# Patient Record
Sex: FEMALE | Race: WHITE | NOT HISPANIC OR LATINO | URBAN - METROPOLITAN AREA
[De-identification: names, ages, dates, MRNs, and addresses within clinical notes are randomized per-mention and may not be internally consistent; named-entity substitution may affect disease eponyms.]

---

## 2018-11-23 ENCOUNTER — INPATIENT (INPATIENT)
Facility: HOSPITAL | Age: 73
LOS: 5 days | Discharge: NURSING, REHAB OR LONG-TERM CARE | DRG: 418 | End: 2018-11-29
Attending: SURGERY | Admitting: SURGERY
Payer: MEDICARE

## 2018-11-23 VITALS
DIASTOLIC BLOOD PRESSURE: 71 MMHG | HEART RATE: 68 BPM | HEIGHT: 66 IN | TEMPERATURE: 99 F | WEIGHT: 293 LBS | SYSTOLIC BLOOD PRESSURE: 186 MMHG | RESPIRATION RATE: 17 BRPM | OXYGEN SATURATION: 96 %

## 2018-11-23 LAB
ALBUMIN SERPL ELPH-MCNC: 3.7 G/DL — SIGNIFICANT CHANGE UP (ref 3.3–5)
ALP SERPL-CCNC: 82 U/L — SIGNIFICANT CHANGE UP (ref 40–120)
ALT FLD-CCNC: 20 U/L — SIGNIFICANT CHANGE UP (ref 12–78)
ANION GAP SERPL CALC-SCNC: 8 MMOL/L — SIGNIFICANT CHANGE UP (ref 5–17)
APTT BLD: 29.2 SEC — SIGNIFICANT CHANGE UP (ref 27.5–36.3)
AST SERPL-CCNC: 24 U/L — SIGNIFICANT CHANGE UP (ref 15–37)
BASOPHILS # BLD AUTO: 0.04 K/UL — SIGNIFICANT CHANGE UP (ref 0–0.2)
BASOPHILS NFR BLD AUTO: 0.3 % — SIGNIFICANT CHANGE UP (ref 0–2)
BILIRUB SERPL-MCNC: 0.7 MG/DL — SIGNIFICANT CHANGE UP (ref 0.2–1.2)
BUN SERPL-MCNC: 16 MG/DL — SIGNIFICANT CHANGE UP (ref 7–23)
CALCIUM SERPL-MCNC: 9.4 MG/DL — SIGNIFICANT CHANGE UP (ref 8.5–10.1)
CHLORIDE SERPL-SCNC: 99 MMOL/L — SIGNIFICANT CHANGE UP (ref 96–108)
CO2 SERPL-SCNC: 30 MMOL/L — SIGNIFICANT CHANGE UP (ref 22–31)
CREAT SERPL-MCNC: 0.66 MG/DL — SIGNIFICANT CHANGE UP (ref 0.5–1.3)
EOSINOPHIL # BLD AUTO: 0 K/UL — SIGNIFICANT CHANGE UP (ref 0–0.5)
EOSINOPHIL NFR BLD AUTO: 0 % — SIGNIFICANT CHANGE UP (ref 0–6)
GLUCOSE SERPL-MCNC: 127 MG/DL — HIGH (ref 70–99)
HCT VFR BLD CALC: 40.7 % — SIGNIFICANT CHANGE UP (ref 34.5–45)
HGB BLD-MCNC: 13 G/DL — SIGNIFICANT CHANGE UP (ref 11.5–15.5)
IMM GRANULOCYTES NFR BLD AUTO: 0.5 % — SIGNIFICANT CHANGE UP (ref 0–1.5)
INR BLD: 1.03 RATIO — SIGNIFICANT CHANGE UP (ref 0.88–1.16)
LIDOCAIN IGE QN: 115 U/L — SIGNIFICANT CHANGE UP (ref 73–393)
LYMPHOCYTES # BLD AUTO: 0.75 K/UL — LOW (ref 1–3.3)
LYMPHOCYTES # BLD AUTO: 5.2 % — LOW (ref 13–44)
MCHC RBC-ENTMCNC: 25.6 PG — LOW (ref 27–34)
MCHC RBC-ENTMCNC: 31.9 GM/DL — LOW (ref 32–36)
MCV RBC AUTO: 80.3 FL — SIGNIFICANT CHANGE UP (ref 80–100)
MONOCYTES # BLD AUTO: 0.74 K/UL — SIGNIFICANT CHANGE UP (ref 0–0.9)
MONOCYTES NFR BLD AUTO: 5.1 % — SIGNIFICANT CHANGE UP (ref 2–14)
NEUTROPHILS # BLD AUTO: 12.88 K/UL — HIGH (ref 1.8–7.4)
NEUTROPHILS NFR BLD AUTO: 88.9 % — HIGH (ref 43–77)
NT-PROBNP SERPL-SCNC: 98 PG/ML — SIGNIFICANT CHANGE UP (ref 0–450)
PLATELET # BLD AUTO: 273 K/UL — SIGNIFICANT CHANGE UP (ref 150–400)
POTASSIUM SERPL-MCNC: 4.1 MMOL/L — SIGNIFICANT CHANGE UP (ref 3.5–5.3)
POTASSIUM SERPL-SCNC: 4.1 MMOL/L — SIGNIFICANT CHANGE UP (ref 3.5–5.3)
PROT SERPL-MCNC: 8.3 G/DL — SIGNIFICANT CHANGE UP (ref 6–8.3)
PROTHROM AB SERPL-ACNC: 11.6 SEC — SIGNIFICANT CHANGE UP (ref 10–12.9)
RBC # BLD: 5.07 M/UL — SIGNIFICANT CHANGE UP (ref 3.8–5.2)
RBC # FLD: 13.9 % — SIGNIFICANT CHANGE UP (ref 10.3–14.5)
SODIUM SERPL-SCNC: 137 MMOL/L — SIGNIFICANT CHANGE UP (ref 135–145)
TROPONIN I SERPL-MCNC: <.015 NG/ML — SIGNIFICANT CHANGE UP (ref 0.01–0.04)
WBC # BLD: 14.48 K/UL — HIGH (ref 3.8–10.5)
WBC # FLD AUTO: 14.48 K/UL — HIGH (ref 3.8–10.5)

## 2018-11-23 PROCEDURE — 76705 ECHO EXAM OF ABDOMEN: CPT | Mod: 26

## 2018-11-23 PROCEDURE — 71046 X-RAY EXAM CHEST 2 VIEWS: CPT | Mod: 26

## 2018-11-23 RX ORDER — IOHEXOL 300 MG/ML
30 INJECTION, SOLUTION INTRAVENOUS ONCE
Qty: 0 | Refills: 0 | Status: COMPLETED | OUTPATIENT
Start: 2018-11-23 | End: 2018-11-23

## 2018-11-23 RX ORDER — SODIUM CHLORIDE 9 MG/ML
3 INJECTION INTRAMUSCULAR; INTRAVENOUS; SUBCUTANEOUS ONCE
Qty: 0 | Refills: 0 | Status: COMPLETED | OUTPATIENT
Start: 2018-11-23 | End: 2018-11-23

## 2018-11-23 RX ORDER — ACETAMINOPHEN 500 MG
1000 TABLET ORAL ONCE
Qty: 0 | Refills: 0 | Status: COMPLETED | OUTPATIENT
Start: 2018-11-23 | End: 2018-11-24

## 2018-11-23 RX ORDER — SODIUM CHLORIDE 9 MG/ML
1000 INJECTION INTRAMUSCULAR; INTRAVENOUS; SUBCUTANEOUS ONCE
Qty: 0 | Refills: 0 | Status: COMPLETED | OUTPATIENT
Start: 2018-11-23 | End: 2018-11-24

## 2018-11-23 RX ORDER — KETOROLAC TROMETHAMINE 30 MG/ML
30 SYRINGE (ML) INJECTION ONCE
Qty: 0 | Refills: 0 | Status: DISCONTINUED | OUTPATIENT
Start: 2018-11-23 | End: 2018-11-23

## 2018-11-23 RX ORDER — SODIUM CHLORIDE 9 MG/ML
1000 INJECTION INTRAMUSCULAR; INTRAVENOUS; SUBCUTANEOUS ONCE
Qty: 0 | Refills: 0 | Status: COMPLETED | OUTPATIENT
Start: 2018-11-23 | End: 2018-11-23

## 2018-11-23 RX ORDER — MORPHINE SULFATE 50 MG/1
2 CAPSULE, EXTENDED RELEASE ORAL ONCE
Qty: 0 | Refills: 0 | Status: DISCONTINUED | OUTPATIENT
Start: 2018-11-23 | End: 2018-11-23

## 2018-11-23 RX ORDER — FAMOTIDINE 10 MG/ML
20 INJECTION INTRAVENOUS ONCE
Qty: 0 | Refills: 0 | Status: COMPLETED | OUTPATIENT
Start: 2018-11-23 | End: 2018-11-23

## 2018-11-23 RX ORDER — ASPIRIN/CALCIUM CARB/MAGNESIUM 324 MG
325 TABLET ORAL ONCE
Qty: 0 | Refills: 0 | Status: DISCONTINUED | OUTPATIENT
Start: 2018-11-23 | End: 2018-11-23

## 2018-11-23 RX ADMIN — FAMOTIDINE 20 MILLIGRAM(S): 10 INJECTION INTRAVENOUS at 21:00

## 2018-11-23 RX ADMIN — IOHEXOL 30 MILLILITER(S): 300 INJECTION, SOLUTION INTRAVENOUS at 21:15

## 2018-11-23 RX ADMIN — SODIUM CHLORIDE 3 MILLILITER(S): 9 INJECTION INTRAMUSCULAR; INTRAVENOUS; SUBCUTANEOUS at 21:51

## 2018-11-23 RX ADMIN — SODIUM CHLORIDE 1000 MILLILITER(S): 9 INJECTION INTRAMUSCULAR; INTRAVENOUS; SUBCUTANEOUS at 19:45

## 2018-11-23 NOTE — ED PROVIDER NOTE - PROGRESS NOTE DETAILS
Able to obtain IV access but not able to obtain blood.  Pt may be suffering from dehydration.  Will hydrate obtain US and reassess us shows + gallstone. will consult surgery and ct abd/pelvis. spoke to dr soler surgery who advised to do ct abd/pelvis with IV and po. he will re-eval

## 2018-11-23 NOTE — ED ADULT NURSE NOTE - NSIMPLEMENTINTERV_GEN_ALL_ED
Implemented All Universal Safety Interventions:  [place] to call system. Call bell, personal items and telephone within reach. Instruct patient to call for assistance. Room bathroom lighting operational. Non-slip footwear when patient is off stretcher. Physically safe environment: no spills, clutter or unnecessary equipment. Stretcher in lowest position, wheels locked, appropriate side rails in place. Implemented All Fall with Harm Risk Interventions:  [place] to call system. Call bell, personal items and telephone within reach. Instruct patient to call for assistance. Room bathroom lighting operational. Non-slip footwear when patient is off stretcher. Physically safe environment: no spills, clutter or unnecessary equipment. Stretcher in lowest position, wheels locked, appropriate side rails in place. Provide visual cue, wrist band, yellow gown, etc. Monitor gait and stability. Monitor for mental status changes and reorient to person, place, and time. Review medications for side effects contributing to fall risk. Reinforce activity limits and safety measures with patient and family. Provide visual clues: red socks.

## 2018-11-23 NOTE — ED PROVIDER NOTE - CARE PLAN
Principal Discharge DX:	Cholecystitis  Assessment and plan of treatment:	admit  Secondary Diagnosis:	Abdominal pain

## 2018-11-23 NOTE — ED PROVIDER NOTE - SEVERE SEPSIS ALERT DETAILS
pt with elevated wbc count. pt without fever, tachycardiac, hypotension. sepsis not suspected at this time

## 2018-11-23 NOTE — ED PROVIDER NOTE - OBJECTIVE STATEMENT
pt is a 74yo female with pmhx of lymphedema, gallstones, and htn c/o chest pain/abd pain x today 5am. pt reports diffuse upper abd pain radiating to her back described as dull. pt thought she had gas so she took "gas pill" that provided minimal relief. pt reports she ate " a lot" at dinner last pm, had 3 normal bms today. pt reports she is belching a lot. pt reports nausea with one episode of vomiting.   pmd/cardio: marci

## 2018-11-23 NOTE — ED ADULT NURSE NOTE - OBJECTIVE STATEMENT
pt presents to ED c/o abd and cp x 1 day. States she has a hx of a large gallstone that had previously been seen on imaging but she has been asymptomatic from. No fevers/chills, + nausea no vomiting or diarrhea. Skin is warm and dry. respirations even and unlabored. iv placed, labs drawn and sent. awaiting further orders.

## 2018-11-23 NOTE — ED PROVIDER NOTE - ATTENDING CONTRIBUTION TO CARE
Pt is a 72 yo female who presents to the ED with a cc of upper abdominal/ lower chest pressure.   PMHx of HTN, lymphedema, h/o gallstones.  Pt does admit to overeating the night prior.  She reports that she awoke around 5 am with upper abdominal discomfort radiating to her back and lower chest.  Pt reports that the discomfort was pressure like in nature and she believed was related to gas.  She took OTC Gas X with no relief.  Pt further reported N/V, and increased belching.  Symptoms remained and so pt came to the ED for further work up.  Denies fever, chills, D/C SOB, ext numbness or weakness.  On exam pt lying in bed NAD, NCAT, PERRL, EOMI, heart RRR, lungs CTA diminished at the bases,  abd soft with TTP to RUQ epigastric region, lymphedema noted to bilateral lower ext.  Will obtain screening abdominal labs, cardiac enzymes, EKG, chest x-ray, abd sonogram.  Agree with above plan of care

## 2018-11-24 ENCOUNTER — TRANSCRIPTION ENCOUNTER (OUTPATIENT)
Age: 73
End: 2018-11-24

## 2018-11-24 DIAGNOSIS — K81.9 CHOLECYSTITIS, UNSPECIFIED: ICD-10-CM

## 2018-11-24 DIAGNOSIS — K81.0 ACUTE CHOLECYSTITIS: ICD-10-CM

## 2018-11-24 DIAGNOSIS — N93.8 OTHER SPECIFIED ABNORMAL UTERINE AND VAGINAL BLEEDING: ICD-10-CM

## 2018-11-24 DIAGNOSIS — I10 ESSENTIAL (PRIMARY) HYPERTENSION: ICD-10-CM

## 2018-11-24 DIAGNOSIS — N39.0 URINARY TRACT INFECTION, SITE NOT SPECIFIED: ICD-10-CM

## 2018-11-24 LAB
ALBUMIN SERPL ELPH-MCNC: 3 G/DL — LOW (ref 3.3–5)
ALP SERPL-CCNC: 67 U/L — SIGNIFICANT CHANGE UP (ref 40–120)
ALT FLD-CCNC: 17 U/L — SIGNIFICANT CHANGE UP (ref 12–78)
ANION GAP SERPL CALC-SCNC: 9 MMOL/L — SIGNIFICANT CHANGE UP (ref 5–17)
APTT BLD: 27.4 SEC — LOW (ref 28.5–37)
AST SERPL-CCNC: 15 U/L — SIGNIFICANT CHANGE UP (ref 15–37)
BASOPHILS # BLD AUTO: 0.03 K/UL — SIGNIFICANT CHANGE UP (ref 0–0.2)
BASOPHILS NFR BLD AUTO: 0.2 % — SIGNIFICANT CHANGE UP (ref 0–2)
BILIRUB DIRECT SERPL-MCNC: 0.2 MG/DL — SIGNIFICANT CHANGE UP (ref 0.05–0.2)
BILIRUB INDIRECT FLD-MCNC: 0.8 MG/DL — SIGNIFICANT CHANGE UP (ref 0.2–1)
BILIRUB SERPL-MCNC: 1 MG/DL — SIGNIFICANT CHANGE UP (ref 0.2–1.2)
BUN SERPL-MCNC: 13 MG/DL — SIGNIFICANT CHANGE UP (ref 7–23)
CALCIUM SERPL-MCNC: 8.1 MG/DL — LOW (ref 8.5–10.1)
CHLORIDE SERPL-SCNC: 101 MMOL/L — SIGNIFICANT CHANGE UP (ref 96–108)
CO2 SERPL-SCNC: 28 MMOL/L — SIGNIFICANT CHANGE UP (ref 22–31)
CREAT SERPL-MCNC: 0.62 MG/DL — SIGNIFICANT CHANGE UP (ref 0.5–1.3)
EOSINOPHIL # BLD AUTO: 0 K/UL — SIGNIFICANT CHANGE UP (ref 0–0.5)
EOSINOPHIL NFR BLD AUTO: 0 % — SIGNIFICANT CHANGE UP (ref 0–6)
GLUCOSE SERPL-MCNC: 118 MG/DL — HIGH (ref 70–99)
HCT VFR BLD CALC: 35.1 % — SIGNIFICANT CHANGE UP (ref 34.5–45)
HGB BLD-MCNC: 11.2 G/DL — LOW (ref 11.5–15.5)
IMM GRANULOCYTES NFR BLD AUTO: 0.6 % — SIGNIFICANT CHANGE UP (ref 0–1.5)
INR BLD: 1.13 RATIO — SIGNIFICANT CHANGE UP (ref 0.88–1.16)
LYMPHOCYTES # BLD AUTO: 0.78 K/UL — LOW (ref 1–3.3)
LYMPHOCYTES # BLD AUTO: 5.7 % — LOW (ref 13–44)
MCHC RBC-ENTMCNC: 25.6 PG — LOW (ref 27–34)
MCHC RBC-ENTMCNC: 31.9 GM/DL — LOW (ref 32–36)
MCV RBC AUTO: 80.1 FL — SIGNIFICANT CHANGE UP (ref 80–100)
MONOCYTES # BLD AUTO: 1.53 K/UL — HIGH (ref 0–0.9)
MONOCYTES NFR BLD AUTO: 11.1 % — SIGNIFICANT CHANGE UP (ref 2–14)
NEUTROPHILS # BLD AUTO: 11.37 K/UL — HIGH (ref 1.8–7.4)
NEUTROPHILS NFR BLD AUTO: 82.4 % — HIGH (ref 43–77)
PLATELET # BLD AUTO: 238 K/UL — SIGNIFICANT CHANGE UP (ref 150–400)
POTASSIUM SERPL-MCNC: 3.7 MMOL/L — SIGNIFICANT CHANGE UP (ref 3.5–5.3)
POTASSIUM SERPL-SCNC: 3.7 MMOL/L — SIGNIFICANT CHANGE UP (ref 3.5–5.3)
PROT SERPL-MCNC: 7 G/DL — SIGNIFICANT CHANGE UP (ref 6–8.3)
PROTHROM AB SERPL-ACNC: 12.9 SEC — SIGNIFICANT CHANGE UP (ref 10–12.9)
RBC # BLD: 4.38 M/UL — SIGNIFICANT CHANGE UP (ref 3.8–5.2)
RBC # FLD: 14 % — SIGNIFICANT CHANGE UP (ref 10.3–14.5)
SODIUM SERPL-SCNC: 138 MMOL/L — SIGNIFICANT CHANGE UP (ref 135–145)
WBC # BLD: 13.79 K/UL — HIGH (ref 3.8–10.5)
WBC # FLD AUTO: 13.79 K/UL — HIGH (ref 3.8–10.5)

## 2018-11-24 PROCEDURE — 47562 LAPAROSCOPIC CHOLECYSTECTOMY: CPT | Mod: AS

## 2018-11-24 PROCEDURE — 99285 EMERGENCY DEPT VISIT HI MDM: CPT

## 2018-11-24 PROCEDURE — 99222 1ST HOSP IP/OBS MODERATE 55: CPT

## 2018-11-24 PROCEDURE — 99223 1ST HOSP IP/OBS HIGH 75: CPT

## 2018-11-24 PROCEDURE — 74177 CT ABD & PELVIS W/CONTRAST: CPT | Mod: 26

## 2018-11-24 RX ORDER — METRONIDAZOLE 500 MG
TABLET ORAL
Qty: 0 | Refills: 0 | Status: DISCONTINUED | OUTPATIENT
Start: 2018-11-24 | End: 2018-11-25

## 2018-11-24 RX ORDER — MORPHINE SULFATE 50 MG/1
2 CAPSULE, EXTENDED RELEASE ORAL EVERY 4 HOURS
Qty: 0 | Refills: 0 | Status: DISCONTINUED | OUTPATIENT
Start: 2018-11-24 | End: 2018-11-25

## 2018-11-24 RX ORDER — MORPHINE SULFATE 50 MG/1
1 CAPSULE, EXTENDED RELEASE ORAL ONCE
Qty: 0 | Refills: 0 | Status: DISCONTINUED | OUTPATIENT
Start: 2018-11-24 | End: 2018-11-24

## 2018-11-24 RX ORDER — CIPROFLOXACIN LACTATE 400MG/40ML
400 VIAL (ML) INTRAVENOUS EVERY 12 HOURS
Qty: 0 | Refills: 0 | Status: DISCONTINUED | OUTPATIENT
Start: 2018-11-24 | End: 2018-11-25

## 2018-11-24 RX ORDER — ERTAPENEM SODIUM 1 G/1
1000 INJECTION, POWDER, LYOPHILIZED, FOR SOLUTION INTRAMUSCULAR; INTRAVENOUS ONCE
Qty: 0 | Refills: 0 | Status: COMPLETED | OUTPATIENT
Start: 2018-11-24 | End: 2018-11-24

## 2018-11-24 RX ORDER — CIPROFLOXACIN LACTATE 400MG/40ML
400 VIAL (ML) INTRAVENOUS ONCE
Qty: 0 | Refills: 0 | Status: COMPLETED | OUTPATIENT
Start: 2018-11-24 | End: 2018-11-24

## 2018-11-24 RX ORDER — SODIUM CHLORIDE 9 MG/ML
1000 INJECTION INTRAMUSCULAR; INTRAVENOUS; SUBCUTANEOUS
Qty: 0 | Refills: 0 | Status: DISCONTINUED | OUTPATIENT
Start: 2018-11-24 | End: 2018-11-25

## 2018-11-24 RX ORDER — DIPHENHYDRAMINE HCL 50 MG
25 CAPSULE ORAL EVERY 6 HOURS
Qty: 0 | Refills: 0 | Status: DISCONTINUED | OUTPATIENT
Start: 2018-11-24 | End: 2018-11-25

## 2018-11-24 RX ORDER — ONDANSETRON 8 MG/1
4 TABLET, FILM COATED ORAL ONCE
Qty: 0 | Refills: 0 | Status: COMPLETED | OUTPATIENT
Start: 2018-11-24 | End: 2018-11-24

## 2018-11-24 RX ORDER — METRONIDAZOLE 500 MG
500 TABLET ORAL ONCE
Qty: 0 | Refills: 0 | Status: COMPLETED | OUTPATIENT
Start: 2018-11-24 | End: 2018-11-24

## 2018-11-24 RX ORDER — ENOXAPARIN SODIUM 100 MG/ML
40 INJECTION SUBCUTANEOUS DAILY
Qty: 0 | Refills: 0 | Status: DISCONTINUED | OUTPATIENT
Start: 2018-11-24 | End: 2018-11-25

## 2018-11-24 RX ORDER — CIPROFLOXACIN LACTATE 400MG/40ML
VIAL (ML) INTRAVENOUS
Qty: 0 | Refills: 0 | Status: DISCONTINUED | OUTPATIENT
Start: 2018-11-24 | End: 2018-11-25

## 2018-11-24 RX ORDER — NYSTATIN CREAM 100000 [USP'U]/G
1 CREAM TOPICAL
Qty: 0 | Refills: 0 | Status: DISCONTINUED | OUTPATIENT
Start: 2018-11-24 | End: 2018-11-25

## 2018-11-24 RX ORDER — ONDANSETRON 8 MG/1
4 TABLET, FILM COATED ORAL EVERY 6 HOURS
Qty: 0 | Refills: 0 | Status: DISCONTINUED | OUTPATIENT
Start: 2018-11-24 | End: 2018-11-25

## 2018-11-24 RX ORDER — METRONIDAZOLE 500 MG
500 TABLET ORAL EVERY 8 HOURS
Qty: 0 | Refills: 0 | Status: DISCONTINUED | OUTPATIENT
Start: 2018-11-24 | End: 2018-11-25

## 2018-11-24 RX ADMIN — ERTAPENEM SODIUM 120 MILLIGRAM(S): 1 INJECTION, POWDER, LYOPHILIZED, FOR SOLUTION INTRAMUSCULAR; INTRAVENOUS at 02:10

## 2018-11-24 RX ADMIN — MORPHINE SULFATE 2 MILLIGRAM(S): 50 CAPSULE, EXTENDED RELEASE ORAL at 11:04

## 2018-11-24 RX ADMIN — MORPHINE SULFATE 1 MILLIGRAM(S): 50 CAPSULE, EXTENDED RELEASE ORAL at 05:17

## 2018-11-24 RX ADMIN — SODIUM CHLORIDE 100 MILLILITER(S): 9 INJECTION INTRAMUSCULAR; INTRAVENOUS; SUBCUTANEOUS at 04:57

## 2018-11-24 RX ADMIN — Medication 100 MILLIGRAM(S): at 11:19

## 2018-11-24 RX ADMIN — Medication 200 MILLIGRAM(S): at 09:39

## 2018-11-24 RX ADMIN — MORPHINE SULFATE 1 MILLIGRAM(S): 50 CAPSULE, EXTENDED RELEASE ORAL at 05:47

## 2018-11-24 RX ADMIN — Medication 400 MILLIGRAM(S): at 00:01

## 2018-11-24 RX ADMIN — MORPHINE SULFATE 2 MILLIGRAM(S): 50 CAPSULE, EXTENDED RELEASE ORAL at 10:34

## 2018-11-24 RX ADMIN — Medication 200 MILLIGRAM(S): at 18:11

## 2018-11-24 RX ADMIN — Medication 100 MILLIGRAM(S): at 21:49

## 2018-11-24 RX ADMIN — SODIUM CHLORIDE 100 MILLILITER(S): 9 INJECTION INTRAMUSCULAR; INTRAVENOUS; SUBCUTANEOUS at 18:08

## 2018-11-24 RX ADMIN — SODIUM CHLORIDE 1000 MILLILITER(S): 9 INJECTION INTRAMUSCULAR; INTRAVENOUS; SUBCUTANEOUS at 00:30

## 2018-11-24 RX ADMIN — Medication 100 MILLIGRAM(S): at 14:58

## 2018-11-24 RX ADMIN — MORPHINE SULFATE 2 MILLIGRAM(S): 50 CAPSULE, EXTENDED RELEASE ORAL at 18:24

## 2018-11-24 RX ADMIN — NYSTATIN CREAM 1 APPLICATION(S): 100000 CREAM TOPICAL at 08:37

## 2018-11-24 RX ADMIN — MORPHINE SULFATE 2 MILLIGRAM(S): 50 CAPSULE, EXTENDED RELEASE ORAL at 18:45

## 2018-11-24 RX ADMIN — Medication 1000 MILLIGRAM(S): at 00:16

## 2018-11-24 RX ADMIN — NYSTATIN CREAM 1 APPLICATION(S): 100000 CREAM TOPICAL at 18:28

## 2018-11-24 RX ADMIN — ONDANSETRON 4 MILLIGRAM(S): 8 TABLET, FILM COATED ORAL at 05:09

## 2018-11-24 NOTE — CONSULT NOTE ADULT - PROBLEM SELECTOR RECOMMENDATION 9
NPO/IVF  IV antibiotic s  MRCP to evaluate duct   prrn pain control   monitor LFTS
For renzo as per Surgery.  C/W IVF, IV antibx.  pain control.

## 2018-11-24 NOTE — CONSULT NOTE ADULT - SUBJECTIVE AND OBJECTIVE BOX
73 yrs old morbidly obese F, with PMHx of ch lymphedema, HTN, dysfunctional uterine bleeding( precancerous uterine hyperplasia, refused for hysterectomy), Ch UTI with urine incontinence, admitted for Ac RUQ pain for 2 days. Pain is radiating to back associated with N/V. Pt is following only cardio Dr Reinoso, has no PMD. Has sedentary life style, walks with cane only few steps.    Pt is diagnosed with Ac renzo and admitted for Cholecystectomy. Is NPO, on IVF and IV cipro and flagyl.  Pt is denies any CP, SOB at rest, abd pain is not well controlled with pain meds.       Vital Signs Last 24 Hrs  T(C): 36.9 (24 Nov 2018 07:27), Max: 37.1 (23 Nov 2018 17:54)  T(F): 98.5 (24 Nov 2018 07:27), Max: 98.8 (23 Nov 2018 17:54)  HR: 80 (24 Nov 2018 07:27) (66 - 80)  BP: 132/64 (24 Nov 2018 07:27) (132/64 - 186/71)  BP(mean): --  RR: 18 (24 Nov 2018 07:27) (17 - 20)  SpO2: 94% (24 Nov 2018 07:27) (94% - 98%)  REVIEW OF SYSTEMS  Constitutional: Fatigue; No fever, chills   Neuro: No headache, numbness, weakness  Resp:  No Cough, no wheezing; dyspnea on exertion   CVS: No chest pain, palpitations, leg swelling   GI: RUQ abdominal pain, no nausea, vomiting, diarrhea   : No dysuria, + incontinence  Skin: No itching, burning, rashes, or lesions   Msk: No weakness, joint pain  Psych: No depression, anxiety, mood swings.  General: Well developed, well nourished, NAD  PHYSICAL EXAM:   HEENT: NCAT, PERRLA, EOMI bl, Dry mucous membranes   Neck: Supple, nontender, no mass  Neurology: A&Ox3, nonfocal,   Respiratory: CTA B/L, No W/R/R  CV: RRR, +S1/S2, no murmurs, rubs or gallops  Abdominal: Soft, obese, RUQ tenderness, +BSx4  Extremities: + edema, with ch skin changes. + peripheral pulses  MSK: Normal ROM, no joint erythema or warmth, no joint swelling   Skin: warm, dry, normal color HPI:  73 yrs old morbidly obese F, with PMHx of ch lymphedema, HTN, dysfunctional uterine bleeding( precancerous uterine hyperplasia, refused for hysterectomy), Ch UTI with urine incontinence, admitted for Ac RUQ pain for 2 days. Pain is radiating to back associated with N/V. Pt is following only cardio Dr Reinoso, has no PMD. Has sedentary life style, walks with cane only few steps.    Pt is diagnosed with Ac renzo and admitted for Cholecystectomy. Is NPO, on IVF and IV cipro and flagyl.  Pt is denies any CP, SOB at rest, abd pain is not well controlled with pain meds.     PAST MEDICAL & SURGICAL HISTORY:    HTN (hypertension)  ch lymphedema,   dysfunctional uterine bleeding( precancerous uterine hyperplasia, refused for hysterectomy),   Ch UTI with urine incontinence,    No significant past surgical history.    Social History:    Marital Status:   Lives with:   Ambulates at home: very limited with cane    Substance Use: no  Tobacco Usage: no   Alcohol Usage: no  Illicit Drug Usage: no    Vital Signs Last 24 Hrs  T(C): 36.9 (24 Nov 2018 07:27), Max: 37.1 (23 Nov 2018 17:54)  T(F): 98.5 (24 Nov 2018 07:27), Max: 98.8 (23 Nov 2018 17:54)  HR: 80 (24 Nov 2018 07:27) (66 - 80)  BP: 132/64 (24 Nov 2018 07:27) (132/64 - 186/71)  BP(mean): --  RR: 18 (24 Nov 2018 07:27) (17 - 20)  SpO2: 94% (24 Nov 2018 07:27) (94% - 98%)  REVIEW OF SYSTEMS  Constitutional: Fatigue; No fever, chills   Neuro: No headache, numbness, weakness  Resp:  No Cough, no wheezing; dyspnea on exertion   CVS: No chest pain, palpitations, leg swelling   GI: RUQ abdominal pain, no nausea, vomiting, diarrhea   : No dysuria, + incontinence  Skin: No itching, burning, rashes, or lesions   Msk: No weakness, joint pain  Psych: No depression, anxiety, mood swings.  General: Well developed, well nourished, NAD  PHYSICAL EXAM:   HEENT: NCAT, PERRLA, EOMI bl, Dry mucous membranes   Neck: Supple, nontender, no mass  Neurology: A&Ox3, nonfocal,   Respiratory: CTA B/L, No W/R/R  CV: RRR, +S1/S2, no murmurs, rubs or gallops  Abdominal: Soft, obese, RUQ tenderness, +BSx4  Extremities: + edema, with ch skin changes. + peripheral pulses  MSK: Normal ROM, no joint erythema or warmth, no joint swelling   Skin: warm, dry, normal color.    Allergies    penicillins (Anaphylaxis)    Intolerances    MEDICATIONS  (STANDING):  ciprofloxacin   IVPB 400 milliGRAM(s) IV Intermittent every 12 hours  ciprofloxacin   IVPB      enoxaparin Injectable 40 milliGRAM(s) SubCutaneous daily  metroNIDAZOLE  IVPB 500 milliGRAM(s) IV Intermittent once  metroNIDAZOLE  IVPB 500 milliGRAM(s) IV Intermittent every 8 hours  metroNIDAZOLE  IVPB      nystatin Powder 1 Application(s) Topical two times a day  sodium chloride 0.9%. 1000 milliLiter(s) (100 mL/Hr) IV Continuous <Continuous>    MEDICATIONS  (PRN):  diphenhydrAMINE   Injectable 25 milliGRAM(s) IV Push every 6 hours PRN insomnia, and/or itch  morphine  - Injectable 2 milliGRAM(s) IV Push every 4 hours PRN Moderate Pain (4 - 6)  ondansetron Injectable 4 milliGRAM(s) IV Push every 6 hours PRN Nausea and/or Vomiting      LABS:                        11.2   13.79 )-----------( 238      ( 24 Nov 2018 05:58 )             35.1     24 Nov 2018 05:58    138    |  101    |  13     ----------------------------<  118    3.7     |  28     |  0.62     Ca    8.1        24 Nov 2018 05:58    TPro  7.0    /  Alb  3.0    /  TBili  1.0    /  DBili  .20    /  AST  15     /  ALT  17     /  AlkPhos  67     24 Nov 2018 05:58    PT/INR - ( 24 Nov 2018 05:58 )   PT: 12.9 sec;   INR: 1.13 ratio         PTT - ( 24 Nov 2018 05:58 )  PTT:27.4 sec    CAPILLARY BLOOD GLUCOSE        UCx       RADIOLOGY & ADDITIONAL TESTS: Large gallstone in the gallbladder neck with gallbladder wall   thickening and trace pericholecystic fat stranding concerning for   cholecystitis. Dilated common bile duct measuring up to 10 mm. No   radiopaque biliary stones. Fatty liver and hepatomegaly.

## 2018-11-24 NOTE — H&P ADULT - HISTORY OF PRESENT ILLNESS
pt is a 72 yo female presents with abdominal pain and vomiting x 1 day. PT was in [facility] until yesterday when she developed upper abdominal pain and vomiting. This was after eating a fatty meal. Vomiting continued and pain localized to RUQ.    denies any fever/chills.  PAin still present

## 2018-11-24 NOTE — PROVIDER CONTACT NOTE (OTHER) - SITUATION
Pt. and daughter refusing lovenox administration.  Pt. feels she will not need lovenox until prior to surgery.

## 2018-11-24 NOTE — CONSULT NOTE ADULT - ATTENDING COMMENTS
Pt has poor follow up as out pt, and sedentary life style with morbid obesity, and ch UTI.  No Hx of cardiac events. will need cardio eval. Called Dr Shell  Clearance pending  w/u.

## 2018-11-24 NOTE — CHART NOTE - NSCHARTNOTEFT_GEN_A_CORE
Called by RN as patient and daughter (Janel Butler) wanted to know the results of the CT scan and the workup done in the ER. Spoke to patient and daughter at length re: the diagnosis, risk factors for her condition, current clinical status, lab data, imaging results. potential further studies and the possibility of elective surgery vs emergent surgery depending on her course. Gave patient information on her current diagnosis, as well as the different options that she may have depending on her clinical status. Stressed that the final recommendations will be made by surgery when she is examined in the morning. Patient complaining of RUQ pain. Had fatty foods the day before thanksgiving as well as on thanksgiving. Pain started at 5 AM on 11/23.  States that it started off as a band under her breast radiating around her back like a ring. Now is in her RUQ and radiating to the right side. States that the pain is severe at this time, but appears to be in NAD.    Vital Signs Last 24 Hrs  T(C): 36.9 (24 Nov 2018 02:05), Max: 37.1 (23 Nov 2018 17:54)  T(F): 98.5 (24 Nov 2018 02:05), Max: 98.8 (23 Nov 2018 17:54)  HR: 66 (24 Nov 2018 02:05) (66 - 79)  BP: 145/70 (24 Nov 2018 02:05) (145/70 - 186/71)  RR: 20 (24 Nov 2018 02:05) (17 - 20)  SpO2: 97% (24 Nov 2018 02:05) (96% - 98%)    Labs:                      13.0   14.48 )-----------( 273      ( 23 Nov 2018 20:37 )             40.7     23 Nov 2018 20:37    137    |  99     |  16     ----------------------------<  127    4.1     |  30     |  0.66     Ca    9.4        23 Nov 2018 20:37    TPro  8.3    /  Alb  3.7    /  TBili  0.7    /  DBili  x      /  AST  24     /  ALT  20     /  AlkPhos  82     23 Nov 2018 20:37    LIVER FUNCTIONS - ( 23 Nov 2018 20:37 )  Alb: 3.7 g/dL / Pro: 8.3 g/dL / ALK PHOS: 82 U/L / ALT: 20 U/L / AST: 24 U/L / GGT: x           PT/INR - ( 23 Nov 2018 20:37 )   PT: 11.6 sec;   INR: 1.03 ratio         PTT - ( 23 Nov 2018 20:37 )  PTT:29.2 sec  CAPILLARY BLOOD GLUCOSE        CARDIAC MARKERS ( 23 Nov 2018 20:37 )  <.015 ng/mL / x     / x     / x     / x          Physical Exam:    Gen: obese female appears well  HEENT: dry mucous membranes  Cardio: +S1S2 RRR  Pulm: diminished but equal and clear breath sounds anteriorly (likely due to body habitus)  Abd: soft, obese, ND, +RUQ tenderness to palpation, +[place] sign, bowel sounds present  Ext: chronic lymphedema with chronic skin changes    Imaging:    CXR: clear lungs    < from: US Abdomen Limited (11.23.18 @ 20:25) >    EXAM:  US ABDOMEN LIMITED                            PROCEDURE DATE:  11/23/2018        INTERPRETATION:  CLINICAL INFORMATION: Right upper quadrant pain    COMPARISON: None available.    TECHNIQUE: Sonography of the right upper quadrant.     FINDINGS:    Liver: Mild hepatomegaly measuring 19.7 cm in diameter..    Bile ducts: The Common bile duct is mildly dilated measures 7 mm.     Gallbladder: Cholelithiasis.  Mild gallbladder wall thickening measuring   3 mm.     Pancreas: Visualized portions are within normal limits.    Right kidney: 11.4 cm. No hydronephrosis. 4.5 cm cyst within the mid   right kidney.    Ascites: None.    No Bravo's sign was detected.    IMPRESSION:     Cholelithiasis and mild gallbladder wall thickening. Minimal CBD   dilatation measuring 7 mm in diameter. No pericholecystic fluid.      KONSTANTIN ORNELAS M.D., ATTENDING RADIOLOGIST  This document has been electronically signed. Nov 23 2018  8:32PM    < end of copied text >  < from: CT Abdomen and Pelvis w/ Oral Cont and w/ IV Cont (11.24.18 @ 00:52) >      EXAM:  CT ABDOMEN AND PELVIS OC IC                            PROCEDURE DATE:  11/24/2018          INTERPRETATION:  CLINICAL INFORMATION: Abdominal pain.    TECHNIQUE: Contrast enhanced CT of the abdomen and pelvis was performed   with coronal and sagittal reformats. 96 cc Omnipaque 350 were   administered intravenously and 4 cc were discarded.     COMPARISON: Abdominal ultrasound 11/23/2018.    FINDINGS:    LOWER CHEST: Within normal limits.    HEPATOBILIARY: Large gallstone in the gallbladderneck with gallbladder   wall thickening and trace pericholecystic fat stranding concerning for   cholecystitis. Dilated common bile duct measuring up to 10 mm. No   radiopaque biliary stones. Fatty liver and hepatomegaly.    PANCREAS: Within normal limits.  SPLEEN: Within normal limits.  ADRENALS: Within normal limits.    KIDNEYS/URETERS/BLADDER: Right posterior interpolar renal cyst measuring   5.2 cm. Subcentimeter hypodense right renal lesion not well   characterized. Symmetric renal enhancement. Punctate nonobstructing right   renal calculi. No hydronephrosis or ureteral calculus. Bladder within   normal limits.  REPRODUCTIVE ORGANS: Enlarged, globular uterus suspicious for adenomyosis.    BOWEL/PERITONEUM: No bowel obstruction or pneumoperitoneum.  Normal   appendix. Colonic diverticulosis without diverticulitis. Portions of the   gastrointestinal tract are collapsed, limiting evaluation.     VESSELS:  Moderate calcific atherosclerosis. No abdominal aortic aneurysm.  LYMPHATICS/RETROPERITONEUM: No lymphadenopathy. No retroperitoneal   hematoma.      SOFT TISSUES: Rectus diastasis.  BONES: Multilevel spinal degenerative changes. Grade 1 anterolisthesis at   L4-L5.    IMPRESSION: Large gallstone in the gallbladder neck with gallbladder wall   thickening and trace pericholecystic fat stranding concerning for   cholecystitis. Dilated common bile duct measuring up to 10 mm. No   radiopaque biliary stones. Fatty liver and hepatomegaly.    Correlate with serum bilirubin levels for biliary obstruction. Consider   MRCP/ERCP as indicated.      CLAIRE BOJORQUEZ M.D., ATTENDING RADIOLOGIST  This document has been electronically signed. Nov 24 2018  1:02AM  < end of copied text >      Impression: Acute Cholecystitis  - will give morphine 1mg + zofran 4mg IV push x 1  - continue IV fluids as she appears clinically dry at this time  - further recommendations/plan to be provided by surgery upon examination  - CBD dilated to 10mm on CT but with normal bili and LFTs, added AM labs  - s/p dose of invanz in the ED, surgery to determine further antibiotics  - no evidence of ascending cholangitis at this time; further management will depend on clinical status Called by RN as patient and daughter (Janel Butler) wanted to know the results of the CT scan and the workup done in the ER. Spoke to patient and daughter at length re: the diagnosis, risk factors for her condition, current clinical status, lab data, imaging results. potential further studies and the possibility of elective surgery vs emergent surgery depending on her course. Gave patient information on her current diagnosis, as well as the different options that she may have depending on her clinical status. Stressed that the final recommendations will be made by surgery when she is examined in the morning. Patient complaining of RUQ pain. Had fatty foods the day before thanksgiving as well as on thanksgiving. Pain started at 5 AM on 11/23.  States that it started off as a band under her breast radiating around her back like a ring. Now is in her RUQ and radiating to the right side. States that the pain is severe at this time, but appears to be in NAD.    Vital Signs Last 24 Hrs  T(C): 36.9 (24 Nov 2018 02:05), Max: 37.1 (23 Nov 2018 17:54)  T(F): 98.5 (24 Nov 2018 02:05), Max: 98.8 (23 Nov 2018 17:54)  HR: 66 (24 Nov 2018 02:05) (66 - 79)  BP: 145/70 (24 Nov 2018 02:05) (145/70 - 186/71)  RR: 20 (24 Nov 2018 02:05) (17 - 20)  SpO2: 97% (24 Nov 2018 02:05) (96% - 98%)    Labs:                      13.0   14.48 )-----------( 273      ( 23 Nov 2018 20:37 )             40.7     23 Nov 2018 20:37    137    |  99     |  16     ----------------------------<  127    4.1     |  30     |  0.66     Ca    9.4        23 Nov 2018 20:37    TPro  8.3    /  Alb  3.7    /  TBili  0.7    /  DBili  x      /  AST  24     /  ALT  20     /  AlkPhos  82     23 Nov 2018 20:37    LIVER FUNCTIONS - ( 23 Nov 2018 20:37 )  Alb: 3.7 g/dL / Pro: 8.3 g/dL / ALK PHOS: 82 U/L / ALT: 20 U/L / AST: 24 U/L / GGT: x           PT/INR - ( 23 Nov 2018 20:37 )   PT: 11.6 sec;   INR: 1.03 ratio         PTT - ( 23 Nov 2018 20:37 )  PTT:29.2 sec  CAPILLARY BLOOD GLUCOSE        CARDIAC MARKERS ( 23 Nov 2018 20:37 )  <.015 ng/mL / x     / x     / x     / x          Physical Exam:    Gen: obese female appears well  HEENT: dry mucous membranes  Cardio: +S1S2 RRR  Pulm: diminished but equal and clear breath sounds anteriorly (likely due to body habitus)  Abd: soft, obese, ND, +RUQ tenderness to palpation, +[place] sign, bowel sounds present  Ext: chronic lymphedema with chronic skin changes    Imaging:    CXR: clear lungs    < from: US Abdomen Limited (11.23.18 @ 20:25) >    EXAM:  US ABDOMEN LIMITED                            PROCEDURE DATE:  11/23/2018        INTERPRETATION:  CLINICAL INFORMATION: Right upper quadrant pain    COMPARISON: None available.    TECHNIQUE: Sonography of the right upper quadrant.     FINDINGS:    Liver: Mild hepatomegaly measuring 19.7 cm in diameter..    Bile ducts: The Common bile duct is mildly dilated measures 7 mm.     Gallbladder: Cholelithiasis.  Mild gallbladder wall thickening measuring   3 mm.     Pancreas: Visualized portions are within normal limits.    Right kidney: 11.4 cm. No hydronephrosis. 4.5 cm cyst within the mid   right kidney.    Ascites: None.    No Bravo's sign was detected.    IMPRESSION:     Cholelithiasis and mild gallbladder wall thickening. Minimal CBD   dilatation measuring 7 mm in diameter. No pericholecystic fluid.      KONSTANTIN ORNELAS M.D., ATTENDING RADIOLOGIST  This document has been electronically signed. Nov 23 2018  8:32PM    < end of copied text >  < from: CT Abdomen and Pelvis w/ Oral Cont and w/ IV Cont (11.24.18 @ 00:52) >      EXAM:  CT ABDOMEN AND PELVIS OC IC                            PROCEDURE DATE:  11/24/2018          INTERPRETATION:  CLINICAL INFORMATION: Abdominal pain.    TECHNIQUE: Contrast enhanced CT of the abdomen and pelvis was performed   with coronal and sagittal reformats. 96 cc Omnipaque 350 were   administered intravenously and 4 cc were discarded.     COMPARISON: Abdominal ultrasound 11/23/2018.    FINDINGS:    LOWER CHEST: Within normal limits.    HEPATOBILIARY: Large gallstone in the gallbladderneck with gallbladder   wall thickening and trace pericholecystic fat stranding concerning for   cholecystitis. Dilated common bile duct measuring up to 10 mm. No   radiopaque biliary stones. Fatty liver and hepatomegaly.    PANCREAS: Within normal limits.  SPLEEN: Within normal limits.  ADRENALS: Within normal limits.    KIDNEYS/URETERS/BLADDER: Right posterior interpolar renal cyst measuring   5.2 cm. Subcentimeter hypodense right renal lesion not well   characterized. Symmetric renal enhancement. Punctate nonobstructing right   renal calculi. No hydronephrosis or ureteral calculus. Bladder within   normal limits.  REPRODUCTIVE ORGANS: Enlarged, globular uterus suspicious for adenomyosis.    BOWEL/PERITONEUM: No bowel obstruction or pneumoperitoneum.  Normal   appendix. Colonic diverticulosis without diverticulitis. Portions of the   gastrointestinal tract are collapsed, limiting evaluation.     VESSELS:  Moderate calcific atherosclerosis. No abdominal aortic aneurysm.  LYMPHATICS/RETROPERITONEUM: No lymphadenopathy. No retroperitoneal   hematoma.      SOFT TISSUES: Rectus diastasis.  BONES: Multilevel spinal degenerative changes. Grade 1 anterolisthesis at   L4-L5.    IMPRESSION: Large gallstone in the gallbladder neck with gallbladder wall   thickening and trace pericholecystic fat stranding concerning for   cholecystitis. Dilated common bile duct measuring up to 10 mm. No   radiopaque biliary stones. Fatty liver and hepatomegaly.    Correlate with serum bilirubin levels for biliary obstruction. Consider   MRCP/ERCP as indicated.      CLAIRE BOJORQUEZ M.D., ATTENDING RADIOLOGIST  This document has been electronically signed. Nov 24 2018  1:02AM  < end of copied text >      Impression: Acute Cholecystitis  - will give morphine 1mg + zofran 4mg IV push x 1  - continue IV fluids as she appears clinically dry at this time  - further recommendations/plan to be provided by surgery upon examination  - CBD dilated to 10mm on CT but with normal bili and LFTs, added AM labs  - s/p dose of invanz in the ED, surgery to determine further antibiotics  - no evidence of ascending cholangitis at this time  - further management will depend on clinical status Called by RN as patient and daughter (Janel Butler) wanted to know the results of the CT scan and the workup done in the ER. Spoke to patient and daughter at length re: the diagnosis, risk factors for her condition, current clinical status, lab data, imaging results. potential further studies and the possibility of elective surgery vs emergent surgery depending on her course. Gave patient information on her current diagnosis, as well as the different options that she may have depending on her clinical status. Stressed that the final recommendations will be made by surgery when she is examined in the morning. Patient complaining of RUQ pain. Had fatty foods the day before thanksgiving as well as on thanksgiving. Pain started at 5 AM on 11/23.  States that it started off as a band under her breast radiating around her back like a ring. Now is in her RUQ and radiating to the right side. States that the pain is severe at this time, but appears to be in NAD.    Vital Signs Last 24 Hrs  T(C): 36.9 (24 Nov 2018 02:05), Max: 37.1 (23 Nov 2018 17:54)  T(F): 98.5 (24 Nov 2018 02:05), Max: 98.8 (23 Nov 2018 17:54)  HR: 66 (24 Nov 2018 02:05) (66 - 79)  BP: 145/70 (24 Nov 2018 02:05) (145/70 - 186/71)  RR: 20 (24 Nov 2018 02:05) (17 - 20)  SpO2: 97% (24 Nov 2018 02:05) (96% - 98%)    Labs:                      13.0   14.48 )-----------( 273      ( 23 Nov 2018 20:37 )             40.7     23 Nov 2018 20:37    137    |  99     |  16     ----------------------------<  127    4.1     |  30     |  0.66     Ca    9.4        23 Nov 2018 20:37    TPro  8.3    /  Alb  3.7    /  TBili  0.7    /  DBili  x      /  AST  24     /  ALT  20     /  AlkPhos  82     23 Nov 2018 20:37    LIVER FUNCTIONS - ( 23 Nov 2018 20:37 )  Alb: 3.7 g/dL / Pro: 8.3 g/dL / ALK PHOS: 82 U/L / ALT: 20 U/L / AST: 24 U/L / GGT: x           PT/INR - ( 23 Nov 2018 20:37 )   PT: 11.6 sec;   INR: 1.03 ratio         PTT - ( 23 Nov 2018 20:37 )  PTT:29.2 sec  CAPILLARY BLOOD GLUCOSE        CARDIAC MARKERS ( 23 Nov 2018 20:37 )  <.015 ng/mL / x     / x     / x     / x          Physical Exam:    Gen: obese female appears well  HEENT: dry mucous membranes  Cardio: +S1S2 RRR  Pulm: diminished but equal and clear breath sounds anteriorly (likely due to body habitus)  Abd: soft, obese, ND, +RUQ tenderness to palpation, +[place] sign, bowel sounds present  Ext: chronic lymphedema with chronic skin changes    Imaging:    CXR: clear lungs    < from: US Abdomen Limited (11.23.18 @ 20:25) >    EXAM:  US ABDOMEN LIMITED                            PROCEDURE DATE:  11/23/2018        INTERPRETATION:  CLINICAL INFORMATION: Right upper quadrant pain    COMPARISON: None available.    TECHNIQUE: Sonography of the right upper quadrant.     FINDINGS:    Liver: Mild hepatomegaly measuring 19.7 cm in diameter..    Bile ducts: The Common bile duct is mildly dilated measures 7 mm.     Gallbladder: Cholelithiasis.  Mild gallbladder wall thickening measuring   3 mm.     Pancreas: Visualized portions are within normal limits.    Right kidney: 11.4 cm. No hydronephrosis. 4.5 cm cyst within the mid   right kidney.    Ascites: None.    No Bravo's sign was detected.    IMPRESSION:     Cholelithiasis and mild gallbladder wall thickening. Minimal CBD   dilatation measuring 7 mm in diameter. No pericholecystic fluid.      KONSTANTIN ORNELAS M.D., ATTENDING RADIOLOGIST  This document has been electronically signed. Nov 23 2018  8:32PM    < end of copied text >  < from: CT Abdomen and Pelvis w/ Oral Cont and w/ IV Cont (11.24.18 @ 00:52) >      EXAM:  CT ABDOMEN AND PELVIS OC IC                            PROCEDURE DATE:  11/24/2018          INTERPRETATION:  CLINICAL INFORMATION: Abdominal pain.    TECHNIQUE: Contrast enhanced CT of the abdomen and pelvis was performed   with coronal and sagittal reformats. 96 cc Omnipaque 350 were   administered intravenously and 4 cc were discarded.     COMPARISON: Abdominal ultrasound 11/23/2018.    FINDINGS:    LOWER CHEST: Within normal limits.    HEPATOBILIARY: Large gallstone in the gallbladderneck with gallbladder   wall thickening and trace pericholecystic fat stranding concerning for   cholecystitis. Dilated common bile duct measuring up to 10 mm. No   radiopaque biliary stones. Fatty liver and hepatomegaly.    PANCREAS: Within normal limits.  SPLEEN: Within normal limits.  ADRENALS: Within normal limits.    KIDNEYS/URETERS/BLADDER: Right posterior interpolar renal cyst measuring   5.2 cm. Subcentimeter hypodense right renal lesion not well   characterized. Symmetric renal enhancement. Punctate nonobstructing right   renal calculi. No hydronephrosis or ureteral calculus. Bladder within   normal limits.  REPRODUCTIVE ORGANS: Enlarged, globular uterus suspicious for adenomyosis.    BOWEL/PERITONEUM: No bowel obstruction or pneumoperitoneum.  Normal   appendix. Colonic diverticulosis without diverticulitis. Portions of the   gastrointestinal tract are collapsed, limiting evaluation.     VESSELS:  Moderate calcific atherosclerosis. No abdominal aortic aneurysm.  LYMPHATICS/RETROPERITONEUM: No lymphadenopathy. No retroperitoneal   hematoma.      SOFT TISSUES: Rectus diastasis.  BONES: Multilevel spinal degenerative changes. Grade 1 anterolisthesis at   L4-L5.    IMPRESSION: Large gallstone in the gallbladder neck with gallbladder wall   thickening and trace pericholecystic fat stranding concerning for   cholecystitis. Dilated common bile duct measuring up to 10 mm. No   radiopaque biliary stones. Fatty liver and hepatomegaly.    Correlate with serum bilirubin levels for biliary obstruction. Consider   MRCP/ERCP as indicated.      CLAIRE BOJORQUEZ M.D., ATTENDING RADIOLOGIST  This document has been electronically signed. Nov 24 2018  1:02AM  < end of copied text >      Impression: Acute Cholecystitis  - will give morphine 1mg + zofran 4mg IV push x 1  - continue IV fluids as she appears clinically dry at this time  - further recommendations/plan to be provided by surgery upon examination  - CBD dilated to 10mm on CT but with normal bili and LFTs, added AM labs  - s/p dose of invanz in the ED, surgery to determine further antibiotics  - no evidence of ascending cholangitis at this time  - patient appears resistant to the idea of elective surgery  - opting for conservative management if possible  - further management will depend on clinical course Called by RN as patient and daughter (Janel Butler) wanted to know the results of the CT scan and the workup done in the ER. Spoke to patient and daughter at length re: the diagnosis, risk factors for her condition, current clinical status, lab data, imaging results, potential further studies and the possibility of conservative management vs elective surgery vs emergent surgery depending on her course. Gave patient information on her current diagnosis, as well as the different options that she may have depending on her clinical status. Stressed that the final recommendations will be made by surgery when she is examined in the morning. Patient complaining of RUQ pain. Had fatty foods the day before thanksgiving as well as on thanksgiving. Pain started at 5 AM on 11/23. States that it started off as a band under her breast radiating around her back like a ring. Now is in her RUQ and radiating to the right side. States that the pain is severe at this time, but appears to be in NAD.    Vital Signs Last 24 Hrs  T(C): 36.9 (24 Nov 2018 02:05), Max: 37.1 (23 Nov 2018 17:54)  T(F): 98.5 (24 Nov 2018 02:05), Max: 98.8 (23 Nov 2018 17:54)  HR: 66 (24 Nov 2018 02:05) (66 - 79)  BP: 145/70 (24 Nov 2018 02:05) (145/70 - 186/71)  RR: 20 (24 Nov 2018 02:05) (17 - 20)  SpO2: 97% (24 Nov 2018 02:05) (96% - 98%)    Labs:                      13.0   14.48 )-----------( 273      ( 23 Nov 2018 20:37 )             40.7     23 Nov 2018 20:37    137    |  99     |  16     ----------------------------<  127    4.1     |  30     |  0.66     Ca    9.4        23 Nov 2018 20:37    TPro  8.3    /  Alb  3.7    /  TBili  0.7    /  DBili  x      /  AST  24     /  ALT  20     /  AlkPhos  82     23 Nov 2018 20:37    LIVER FUNCTIONS - ( 23 Nov 2018 20:37 )  Alb: 3.7 g/dL / Pro: 8.3 g/dL / ALK PHOS: 82 U/L / ALT: 20 U/L / AST: 24 U/L / GGT: x           PT/INR - ( 23 Nov 2018 20:37 )   PT: 11.6 sec;   INR: 1.03 ratio         PTT - ( 23 Nov 2018 20:37 )  PTT:29.2 sec  CAPILLARY BLOOD GLUCOSE        CARDIAC MARKERS ( 23 Nov 2018 20:37 )  <.015 ng/mL / x     / x     / x     / x          Physical Exam:    Gen: obese female appears well  HEENT: dry mucous membranes  Cardio: +S1S2 RRR  Pulm: diminished but equal and clear breath sounds anteriorly (likely due to body habitus)  Abd: soft, obese, ND, +RUQ tenderness to palpation, +[place] sign, bowel sounds present all 4 quadrants  Skin: moist areas with rash in abdominal folds and under breast area; chronic dermatitis on b/l LE 2/2 lymphedema  Ext: chronic lymphedema with chronic skin changes    Imaging:    CXR: clear lungs    < from: US Abdomen Limited (11.23.18 @ 20:25) >    EXAM:  US ABDOMEN LIMITED                            PROCEDURE DATE:  11/23/2018        INTERPRETATION:  CLINICAL INFORMATION: Right upper quadrant pain    COMPARISON: None available.    TECHNIQUE: Sonography of the right upper quadrant.     FINDINGS:    Liver: Mild hepatomegaly measuring 19.7 cm in diameter..    Bile ducts: The Common bile duct is mildly dilated measures 7 mm.     Gallbladder: Cholelithiasis.  Mild gallbladder wall thickening measuring   3 mm.     Pancreas: Visualized portions are within normal limits.    Right kidney: 11.4 cm. No hydronephrosis. 4.5 cm cyst within the mid   right kidney.    Ascites: None.    No Bravo's sign was detected.    IMPRESSION:     Cholelithiasis and mild gallbladder wall thickening. Minimal CBD   dilatation measuring 7 mm in diameter. No pericholecystic fluid.      KONSTANTIN ORNELAS M.D., ATTENDING RADIOLOGIST  This document has been electronically signed. Nov 23 2018  8:32PM    < end of copied text >  < from: CT Abdomen and Pelvis w/ Oral Cont and w/ IV Cont (11.24.18 @ 00:52) >      EXAM:  CT ABDOMEN AND PELVIS OC IC                            PROCEDURE DATE:  11/24/2018          INTERPRETATION:  CLINICAL INFORMATION: Abdominal pain.    TECHNIQUE: Contrast enhanced CT of the abdomen and pelvis was performed   with coronal and sagittal reformats. 96 cc Omnipaque 350 were   administered intravenously and 4 cc were discarded.     COMPARISON: Abdominal ultrasound 11/23/2018.    FINDINGS:    LOWER CHEST: Within normal limits.    HEPATOBILIARY: Large gallstone in the gallbladderneck with gallbladder   wall thickening and trace pericholecystic fat stranding concerning for   cholecystitis. Dilated common bile duct measuring up to 10 mm. No   radiopaque biliary stones. Fatty liver and hepatomegaly.    PANCREAS: Within normal limits.  SPLEEN: Within normal limits.  ADRENALS: Within normal limits.    KIDNEYS/URETERS/BLADDER: Right posterior interpolar renal cyst measuring   5.2 cm. Subcentimeter hypodense right renal lesion not well   characterized. Symmetric renal enhancement. Punctate nonobstructing right   renal calculi. No hydronephrosis or ureteral calculus. Bladder within   normal limits.  REPRODUCTIVE ORGANS: Enlarged, globular uterus suspicious for adenomyosis.    BOWEL/PERITONEUM: No bowel obstruction or pneumoperitoneum.  Normal   appendix. Colonic diverticulosis without diverticulitis. Portions of the   gastrointestinal tract are collapsed, limiting evaluation.     VESSELS:  Moderate calcific atherosclerosis. No abdominal aortic aneurysm.  LYMPHATICS/RETROPERITONEUM: No lymphadenopathy. No retroperitoneal   hematoma.      SOFT TISSUES: Rectus diastasis.  BONES: Multilevel spinal degenerative changes. Grade 1 anterolisthesis at   L4-L5.    IMPRESSION: Large gallstone in the gallbladder neck with gallbladder wall   thickening and trace pericholecystic fat stranding concerning for   cholecystitis. Dilated common bile duct measuring up to 10 mm. No   radiopaque biliary stones. Fatty liver and hepatomegaly.    Correlate with serum bilirubin levels for biliary obstruction. Consider   MRCP/ERCP as indicated.      CLAIRE BOJORQUEZ M.D., ATTENDING RADIOLOGIST  This document has been electronically signed. Nov 24 2018  1:02AM  < end of copied text >      Impression: Acute Cholecystitis  - will give morphine 1mg + zofran 4mg IV push x 1  - continue IV fluids as she appears clinically dry at this time  - added nystatin powder topically to affected areas  - further recommendations/plan to be provided by surgery upon examination  - CBD dilated to 10mm on CT but with normal bili and LFTs, suggesting against bilary obstruction, added AM labs  - s/p dose of invanz in the ED, surgery to determine further antibiotics  - no evidence of ascending cholangitis at this time  - patient appears resistant to the idea of elective surgery  - opting for conservative management if possible  - further management will depend on clinical course Called by RN as patient and daughter (Janel Butler) wanted to know the results of the CT scan and the workup done in the ER. Spoke to patient and daughter at length re: the diagnosis, risk factors for her condition (obesity, hyperestrogenism, gallstones), current clinical status, lab data, imaging results, potential further studies and the possibility of conservative management vs elective surgery vs emergent surgery depending on her course. Gave patient information on her current diagnosis, as well as the different options that she may have depending on her clinical status. Stressed that the final recommendations will be made by surgery when she is examined in the morning. Patient complaining of RUQ pain. Had fatty foods the day before thanksgiving as well as on thanksgiving. Pain started at 5 AM on 11/23. States that it started off as a band under her breast radiating around her back like a ring. Now is in her RUQ and radiating to the right side. States that the pain is severe at this time, but appears to be in NAD.    Vital Signs Last 24 Hrs  T(C): 36.9 (24 Nov 2018 02:05), Max: 37.1 (23 Nov 2018 17:54)  T(F): 98.5 (24 Nov 2018 02:05), Max: 98.8 (23 Nov 2018 17:54)  HR: 66 (24 Nov 2018 02:05) (66 - 79)  BP: 145/70 (24 Nov 2018 02:05) (145/70 - 186/71)  RR: 20 (24 Nov 2018 02:05) (17 - 20)  SpO2: 97% (24 Nov 2018 02:05) (96% - 98%)    Labs:                      13.0   14.48 )-----------( 273      ( 23 Nov 2018 20:37 )             40.7     23 Nov 2018 20:37    137    |  99     |  16     ----------------------------<  127    4.1     |  30     |  0.66     Ca    9.4        23 Nov 2018 20:37    TPro  8.3    /  Alb  3.7    /  TBili  0.7    /  DBili  x      /  AST  24     /  ALT  20     /  AlkPhos  82     23 Nov 2018 20:37    LIVER FUNCTIONS - ( 23 Nov 2018 20:37 )  Alb: 3.7 g/dL / Pro: 8.3 g/dL / ALK PHOS: 82 U/L / ALT: 20 U/L / AST: 24 U/L / GGT: x           PT/INR - ( 23 Nov 2018 20:37 )   PT: 11.6 sec;   INR: 1.03 ratio         PTT - ( 23 Nov 2018 20:37 )  PTT:29.2 sec  CAPILLARY BLOOD GLUCOSE        CARDIAC MARKERS ( 23 Nov 2018 20:37 )  <.015 ng/mL / x     / x     / x     / x          Physical Exam:    Gen: obese female appears well  HEENT: dry mucous membranes  Cardio: +S1S2 RRR  Pulm: diminished but equal and clear breath sounds anteriorly (likely due to body habitus)  Abd: soft, obese, ND, +RUQ tenderness to palpation, +[place] sign, bowel sounds present all 4 quadrants  Skin: moist areas with rash in abdominal folds and under breast area; chronic dermatitis on b/l LE 2/2 lymphedema  Ext: chronic lymphedema with chronic skin changes    Imaging:    CXR: clear lungs    < from: US Abdomen Limited (11.23.18 @ 20:25) >    EXAM:  US ABDOMEN LIMITED                            PROCEDURE DATE:  11/23/2018        INTERPRETATION:  CLINICAL INFORMATION: Right upper quadrant pain    COMPARISON: None available.    TECHNIQUE: Sonography of the right upper quadrant.     FINDINGS:    Liver: Mild hepatomegaly measuring 19.7 cm in diameter..    Bile ducts: The Common bile duct is mildly dilated measures 7 mm.     Gallbladder: Cholelithiasis.  Mild gallbladder wall thickening measuring   3 mm.     Pancreas: Visualized portions are within normal limits.    Right kidney: 11.4 cm. No hydronephrosis. 4.5 cm cyst within the mid   right kidney.    Ascites: None.    No Bravo's sign was detected.    IMPRESSION:     Cholelithiasis and mild gallbladder wall thickening. Minimal CBD   dilatation measuring 7 mm in diameter. No pericholecystic fluid.      KONSTANTIN ORNELAS M.D., ATTENDING RADIOLOGIST  This document has been electronically signed. Nov 23 2018  8:32PM    < end of copied text >  < from: CT Abdomen and Pelvis w/ Oral Cont and w/ IV Cont (11.24.18 @ 00:52) >      EXAM:  CT ABDOMEN AND PELVIS OC IC                            PROCEDURE DATE:  11/24/2018          INTERPRETATION:  CLINICAL INFORMATION: Abdominal pain.    TECHNIQUE: Contrast enhanced CT of the abdomen and pelvis was performed   with coronal and sagittal reformats. 96 cc Omnipaque 350 were   administered intravenously and 4 cc were discarded.     COMPARISON: Abdominal ultrasound 11/23/2018.    FINDINGS:    LOWER CHEST: Within normal limits.    HEPATOBILIARY: Large gallstone in the gallbladderneck with gallbladder   wall thickening and trace pericholecystic fat stranding concerning for   cholecystitis. Dilated common bile duct measuring up to 10 mm. No   radiopaque biliary stones. Fatty liver and hepatomegaly.    PANCREAS: Within normal limits.  SPLEEN: Within normal limits.  ADRENALS: Within normal limits.    KIDNEYS/URETERS/BLADDER: Right posterior interpolar renal cyst measuring   5.2 cm. Subcentimeter hypodense right renal lesion not well   characterized. Symmetric renal enhancement. Punctate nonobstructing right   renal calculi. No hydronephrosis or ureteral calculus. Bladder within   normal limits.  REPRODUCTIVE ORGANS: Enlarged, globular uterus suspicious for adenomyosis.    BOWEL/PERITONEUM: No bowel obstruction or pneumoperitoneum.  Normal   appendix. Colonic diverticulosis without diverticulitis. Portions of the   gastrointestinal tract are collapsed, limiting evaluation.     VESSELS:  Moderate calcific atherosclerosis. No abdominal aortic aneurysm.  LYMPHATICS/RETROPERITONEUM: No lymphadenopathy. No retroperitoneal   hematoma.      SOFT TISSUES: Rectus diastasis.  BONES: Multilevel spinal degenerative changes. Grade 1 anterolisthesis at   L4-L5.    IMPRESSION: Large gallstone in the gallbladder neck with gallbladder wall   thickening and trace pericholecystic fat stranding concerning for   cholecystitis. Dilated common bile duct measuring up to 10 mm. No   radiopaque biliary stones. Fatty liver and hepatomegaly.    Correlate with serum bilirubin levels for biliary obstruction. Consider   MRCP/ERCP as indicated.      CLAIRE BOJORQUEZ M.D., ATTENDING RADIOLOGIST  This document has been electronically signed. Nov 24 2018  1:02AM  < end of copied text >      Impression: Acute Cholecystitis  - will give morphine 1mg + zofran 4mg IV push x 1  - continue IV fluids as she appears clinically dry at this time  - added nystatin powder topically to affected areas  - further recommendations/plan to be provided by surgery upon examination  - CBD dilated to 10mm on CT but with normal bili and LFTs  - current lab data suggesting against biliary obstruction; added AM labs  - s/p dose of invanz in the ED, surgery to determine further antibiotics  - no evidence of ascending cholangitis at this time  - patient appears resistant to the idea of elective surgery  - opting for conservative management if possible  - further management will depend on clinical course Called by RN as patient and daughter (Janel Butler) wanted to know the results of the CT scan and the workup done in the ER. Spoke to patient and daughter at length re: the diagnosis, risk factors for her condition (obesity, hyperestrogenism, gallstones), current clinical status, lab data, imaging results, potential further studies and the possibility of conservative management vs elective surgery vs emergent surgery depending on her course. Gave patient information on her current diagnosis, as well as the different options that she may have depending on her clinical status. Stressed that the final recommendations will be made by surgery when she is examined in the morning. Patient complaining of RUQ pain. Had fatty foods the day before thanksgiving as well as on thanksgiving. Pain started at 5 AM on 11/23. States that it started off as a band under her breast radiating around her back like a ring. Now is in her RUQ and radiating to the right side. States that the pain is severe at this time, but appears to be in NAD.    Vital Signs Last 24 Hrs  T(C): 36.9 (24 Nov 2018 02:05), Max: 37.1 (23 Nov 2018 17:54)  T(F): 98.5 (24 Nov 2018 02:05), Max: 98.8 (23 Nov 2018 17:54)  HR: 66 (24 Nov 2018 02:05) (66 - 79)  BP: 145/70 (24 Nov 2018 02:05) (145/70 - 186/71)  RR: 20 (24 Nov 2018 02:05) (17 - 20)  SpO2: 97% (24 Nov 2018 02:05) (96% - 98%)    Labs:                      13.0   14.48 )-----------( 273      ( 23 Nov 2018 20:37 )             40.7     23 Nov 2018 20:37    137    |  99     |  16     ----------------------------<  127    4.1     |  30     |  0.66     Ca    9.4        23 Nov 2018 20:37    TPro  8.3    /  Alb  3.7    /  TBili  0.7    /  DBili  x      /  AST  24     /  ALT  20     /  AlkPhos  82     23 Nov 2018 20:37    LIVER FUNCTIONS - ( 23 Nov 2018 20:37 )  Alb: 3.7 g/dL / Pro: 8.3 g/dL / ALK PHOS: 82 U/L / ALT: 20 U/L / AST: 24 U/L / GGT: x           PT/INR - ( 23 Nov 2018 20:37 )   PT: 11.6 sec;   INR: 1.03 ratio         PTT - ( 23 Nov 2018 20:37 )  PTT:29.2 sec  CAPILLARY BLOOD GLUCOSE        CARDIAC MARKERS ( 23 Nov 2018 20:37 )  <.015 ng/mL / x     / x     / x     / x          Physical Exam:    Gen: obese female appears well  HEENT: dry mucous membranes  Cardio: +S1S2 RRR  Pulm: diminished but equal and clear breath sounds anteriorly (likely due to body habitus)  Abd: soft, obese, ND, +RUQ tenderness to palpation, +[place] sign, bowel sounds present all 4 quadrants  Skin: moist areas with rash in abdominal folds and under breast area; chronic dermatitis on b/l LE 2/2 lymphedema  Ext: chronic lymphedema with chronic skin changes    Imaging:    CXR: clear lungs    < from: US Abdomen Limited (11.23.18 @ 20:25) >    EXAM:  US ABDOMEN LIMITED                            PROCEDURE DATE:  11/23/2018        INTERPRETATION:  CLINICAL INFORMATION: Right upper quadrant pain    COMPARISON: None available.    TECHNIQUE: Sonography of the right upper quadrant.     FINDINGS:    Liver: Mild hepatomegaly measuring 19.7 cm in diameter..    Bile ducts: The Common bile duct is mildly dilated measures 7 mm.     Gallbladder: Cholelithiasis.  Mild gallbladder wall thickening measuring   3 mm.     Pancreas: Visualized portions are within normal limits.    Right kidney: 11.4 cm. No hydronephrosis. 4.5 cm cyst within the mid   right kidney.    Ascites: None.    No Bravo's sign was detected.    IMPRESSION:     Cholelithiasis and mild gallbladder wall thickening. Minimal CBD   dilatation measuring 7 mm in diameter. No pericholecystic fluid.      KONSTANTIN ORNELAS M.D., ATTENDING RADIOLOGIST  This document has been electronically signed. Nov 23 2018  8:32PM    < end of copied text >  < from: CT Abdomen and Pelvis w/ Oral Cont and w/ IV Cont (11.24.18 @ 00:52) >      EXAM:  CT ABDOMEN AND PELVIS OC IC                            PROCEDURE DATE:  11/24/2018          INTERPRETATION:  CLINICAL INFORMATION: Abdominal pain.    TECHNIQUE: Contrast enhanced CT of the abdomen and pelvis was performed   with coronal and sagittal reformats. 96 cc Omnipaque 350 were   administered intravenously and 4 cc were discarded.     COMPARISON: Abdominal ultrasound 11/23/2018.    FINDINGS:    LOWER CHEST: Within normal limits.    HEPATOBILIARY: Large gallstone in the gallbladderneck with gallbladder   wall thickening and trace pericholecystic fat stranding concerning for   cholecystitis. Dilated common bile duct measuring up to 10 mm. No   radiopaque biliary stones. Fatty liver and hepatomegaly.    PANCREAS: Within normal limits.  SPLEEN: Within normal limits.  ADRENALS: Within normal limits.    KIDNEYS/URETERS/BLADDER: Right posterior interpolar renal cyst measuring   5.2 cm. Subcentimeter hypodense right renal lesion not well   characterized. Symmetric renal enhancement. Punctate nonobstructing right   renal calculi. No hydronephrosis or ureteral calculus. Bladder within   normal limits.  REPRODUCTIVE ORGANS: Enlarged, globular uterus suspicious for adenomyosis.    BOWEL/PERITONEUM: No bowel obstruction or pneumoperitoneum.  Normal   appendix. Colonic diverticulosis without diverticulitis. Portions of the   gastrointestinal tract are collapsed, limiting evaluation.     VESSELS:  Moderate calcific atherosclerosis. No abdominal aortic aneurysm.  LYMPHATICS/RETROPERITONEUM: No lymphadenopathy. No retroperitoneal   hematoma.      SOFT TISSUES: Rectus diastasis.  BONES: Multilevel spinal degenerative changes. Grade 1 anterolisthesis at   L4-L5.    IMPRESSION: Large gallstone in the gallbladder neck with gallbladder wall   thickening and trace pericholecystic fat stranding concerning for   cholecystitis. Dilated common bile duct measuring up to 10 mm. No   radiopaque biliary stones. Fatty liver and hepatomegaly.    Correlate with serum bilirubin levels for biliary obstruction. Consider   MRCP/ERCP as indicated.      CLAIRE BOJORQUEZ M.D., ATTENDING RADIOLOGIST  This document has been electronically signed. Nov 24 2018  1:02AM  < end of copied text >      Impression: Acute Cholecystitis  - will give morphine 1mg + zofran 4mg IV push x 1  - NPO, continue IVF as she appears clinically dry at this time  - added nystatin powder topically to affected areas  - further recommendations/plan to be provided by surgery upon examination  - CBD dilated to 10mm on CT but with normal bili and LFTs  - current lab data suggesting against biliary obstruction; added AM labs  - s/p dose of invanz in the ED, surgery to determine further antibiotics  - no evidence of ascending cholangitis at this time  - patient appears resistant to the idea of elective surgery  - opting for conservative management if possible  - further management will depend on clinical course

## 2018-11-24 NOTE — H&P ADULT - NSHPPHYSICALEXAM_GEN_ALL_CORE
.  VITAL SIGNS:  T(C): 36.9 (11-24-18 @ 07:27), Max: 37.1 (11-23-18 @ 17:54)  T(F): 98.5 (11-24-18 @ 07:27), Max: 98.8 (11-23-18 @ 17:54)  HR: 80 (11-24-18 @ 07:27) (66 - 80)  BP: 132/64 (11-24-18 @ 07:27) (132/64 - 186/71)  BP(mean): --  RR: 18 (11-24-18 @ 07:27) (17 - 20)  SpO2: 94% (11-24-18 @ 07:27) (94% - 98%)  Wt(kg): --    PHYSICAL EXAM:    Constitutional:  NAD, resting comfortably in bed  Head: NC/AT  Eyes: PERRL b/l  ENT: MMM  Neck: supple; no JVD or thyromegaly  Respiratory: CTA B/L   Cardiac: +S1/S2; RRR   Gastrointestinal: obese, soft, RUQ tenderness  Back: no CVA B/L  Extremities: b/l lymphedema  Musculoskeletal: NROM x4;   Vascular: 2+ radial, femoral, DP/PT pulses B/L  Dermatologic: skin warm, dry and intact; no rashes, wounds, or scars  Lymphatic: no submandibular, cervical or  LAD  Neurologic: AAOx3; CNII-XII grossly intact; no focal deficits  Psychiatric: affect and characteristics of appearance, verbalizations, behaviors are appropriate

## 2018-11-24 NOTE — PROVIDER CONTACT NOTE (OTHER) - SITUATION
Pt. and daughter refusing lovenox administration.  Pt. feels she does not need lovenox until prior to surgery.

## 2018-11-24 NOTE — CONSULT NOTE ADULT - SUBJECTIVE AND OBJECTIVE BOX
[facility] - Yuval Angela, Peter Medrano, [place], Trinity Reese  Office Number: [phone]    Initial Consult Note    CHIEF COMPLAINT: Patient is a 73y old  Female who presents with a chief complaint of cholecystitis (24 Nov 2018 10:19)      HPI:  pt is a 74 yo female presents with abdominal pain and vomiting x 1 day. PT was in usoh until yesterday when she developed upper abdominal pain and vomiting. This was after eating a fatty meal. Vomiting continued and pain localized to RUQ.    denies any fever/chills.  PAin still present (24 Nov 2018 08:37)    She reports that she walks with a cane and can only walk one block.  She is limited on the basis of her lymphedema.  She never has chest pain, increased dyspnea with exertion.  She denies palpitations, syncope, PND, orthopnea, or increased LE edema.  She follows with a cardiologist for her HTN.  All of her noninvasive testing has always been within normal limits.      PAST MEDICAL & SURGICAL HISTORY:  HTN (hypertension)  No significant past surgical history      SOCIAL HISTORY:  No tobacco, ethanol, or drug abuse.    FAMILY HISTORY:  No pertinent family history in first degree relatives    No family history of acute MI or sudden cardiac death.    MEDICATIONS  (STANDING):  ciprofloxacin   IVPB 400 milliGRAM(s) IV Intermittent every 12 hours  ciprofloxacin   IVPB      enoxaparin Injectable 40 milliGRAM(s) SubCutaneous daily  metroNIDAZOLE  IVPB 500 milliGRAM(s) IV Intermittent once  metroNIDAZOLE  IVPB 500 milliGRAM(s) IV Intermittent every 8 hours  metroNIDAZOLE  IVPB      nystatin Powder 1 Application(s) Topical two times a day  sodium chloride 0.9%. 1000 milliLiter(s) (100 mL/Hr) IV Continuous <Continuous>    MEDICATIONS  (PRN):  diphenhydrAMINE   Injectable 25 milliGRAM(s) IV Push every 6 hours PRN insomnia, and/or itch  morphine  - Injectable 2 milliGRAM(s) IV Push every 4 hours PRN Moderate Pain (4 - 6)  ondansetron Injectable 4 milliGRAM(s) IV Push every 6 hours PRN Nausea and/or Vomiting      Allergies    penicillins (Anaphylaxis)    Intolerances        REVIEW OF SYSTEMS:  All other review of systems is negative unless indicated above    VITAL SIGNS:   Vital Signs Last 24 Hrs  T(C): 36.9 (24 Nov 2018 07:27), Max: 37.1 (23 Nov 2018 17:54)  T(F): 98.5 (24 Nov 2018 07:27), Max: 98.8 (23 Nov 2018 17:54)  HR: 80 (24 Nov 2018 07:27) (66 - 80)  BP: 132/64 (24 Nov 2018 07:27) (132/64 - 186/71)  BP(mean): --  RR: 18 (24 Nov 2018 07:27) (17 - 20)  SpO2: 94% (24 Nov 2018 07:27) (94% - 98%)    I&O's Summary    23 Nov 2018 07:01  -  24 Nov 2018 07:00  --------------------------------------------------------  IN: 500 mL / OUT: 0 mL / NET: 500 mL        On Exam:    Constitutional: NAD, alert and oriented x 3  Lungs:  Non-labored, breath sounds are clear bilaterally, No wheezing, rales or rhonchi.  Decreased breath sounds b/l.  Cardiovascular: RRR.  S1 and S2 positive.  distant  Gastrointestinal: Bowel Sounds present, soft, nontender. Obese  Lymph: Chronic LE peripheral edema. No cervical lymphadenopathy.  Neurological: Alert, no focal deficits  Skin: No rashes or ulcers   Psych:  Mood & affect appropriate.    LABS: All Labs Reviewed:                        11.2   13.79 )-----------( 238      ( 24 Nov 2018 05:58 )             35.1                         13.0   14.48 )-----------( 273      ( 23 Nov 2018 20:37 )             40.7     24 Nov 2018 05:58    138    |  101    |  13     ----------------------------<  118    3.7     |  28     |  0.62   23 Nov 2018 20:37    137    |  99     |  16     ----------------------------<  127    4.1     |  30     |  0.66     Ca    8.1        24 Nov 2018 05:58  Ca    9.4        23 Nov 2018 20:37    TPro  7.0    /  Alb  3.0    /  TBili  1.0    /  DBili  .20    /  AST  15     /  ALT  17     /  AlkPhos  67     24 Nov 2018 05:58  TPro  8.3    /  Alb  3.7    /  TBili  0.7    /  DBili  x      /  AST  24     /  ALT  20     /  AlkPhos  82     23 Nov 2018 20:37    PT/INR - ( 24 Nov 2018 05:58 )   PT: 12.9 sec;   INR: 1.13 ratio         PTT - ( 24 Nov 2018 05:58 )  PTT:27.4 sec  CARDIAC MARKERS ( 23 Nov 2018 20:37 )  <.015 ng/mL / x     / x     / x     / x          Blood Culture:   11-23 @ 20:37  Pro Bnp 98        RADIOLOGY:    EKG:n Sinus rhythm.  LVH with repolarization changes.

## 2018-11-24 NOTE — H&P ADULT - NSHPLABSRESULTS_GEN_ALL_CORE
11.2   13.79 )-----------( 238      ( 24 Nov 2018 05:58 )             35.1   11-24    138  |  101  |  13  ----------------------------<  118<H>  3.7   |  28  |  0.62    Ca    8.1<L>      24 Nov 2018 05:58    TPro  7.0  /  Alb  3.0<L>  /  TBili  1.0  /  DBili  .20  /  AST  15  /  ALT  17  /  AlkPhos  67  11-24  < from: US Abdomen Limited (11.23.18 @ 20:25) >    Cholelithiasis and mild gallbladder wall thickening. Minimal CBD   dilatation measuring 7 mm in diameter. No pericholecystic fluid.      < end of copied text >    < from: CT Abdomen and Pelvis w/ Oral Cont and w/ IV Cont (11.24.18 @ 00:52) >      IMPRESSION: Large gallstone in the gallbladder neck with gallbladder wall   thickening and trace pericholecystic fat stranding concerning for   cholecystitis. Dilated common bile duct measuring up to 10 mm. No   radiopaque biliary stones. Fatty liver and hepatomegaly.    < end of copied text >

## 2018-11-24 NOTE — CONSULT NOTE ADULT - SUBJECTIVE AND OBJECTIVE BOX
Chief Complaint:  Patient is a 73y old  Female who presents with a chief complaint of cholecystitis (24 Nov 2018 08:37)      HPI:   72 yo female presents with abdominal pain and vomiting since Thursday. Patient with known history of cholelithiasis. Patient continues to complain of right upper quadrant pain. Nausea and vomiting resolved. Ct scan consistent with acute cholecystitis, patient also noted to have dilated cbd 10mm on CT scan     Allergies:  penicillins (Anaphylaxis)      Medications:  ciprofloxacin   IVPB 400 milliGRAM(s) IV Intermittent every 12 hours  ciprofloxacin   IVPB      diphenhydrAMINE   Injectable 25 milliGRAM(s) IV Push every 6 hours PRN  enoxaparin Injectable 40 milliGRAM(s) SubCutaneous daily  metroNIDAZOLE  IVPB 500 milliGRAM(s) IV Intermittent once  metroNIDAZOLE  IVPB 500 milliGRAM(s) IV Intermittent every 8 hours  metroNIDAZOLE  IVPB      morphine  - Injectable 2 milliGRAM(s) IV Push every 4 hours PRN  nystatin Powder 1 Application(s) Topical two times a day  ondansetron Injectable 4 milliGRAM(s) IV Push every 6 hours PRN  sodium chloride 0.9%. 1000 milliLiter(s) IV Continuous <Continuous>      PMHX/PSHX:  HTN (hypertension)  No significant past surgical history      Family history:  No pertinent family history in first degree relatives      Social History:     ROS:     General:  No wt loss, fevers, chills, night sweats, fatigue,   Eyes:  Good vision, no reported pain  ENT:  No sore throat, pain, runny nose, dysphagia  CV:  No pain, palpitations, hypo/hypertension  Resp:  No dyspnea, cough, tachypnea, wheezing  GI:  +pain, nausea, vomiting, No diarrhea, No constipation, No weight loss, No fever, No pruritis, No rectal bleeding, No tarry stools, No dysphagia,  :  No pain, bleeding, incontinence, nocturia  Muscle:  No pain, weakness  Neuro:  No weakness, tingling, memory problems  Psych:  No fatigue, insomnia, mood problems, depression  Endocrine:  No polyuria, polydipsia, cold/heat intolerance  Heme:  No petechiae, ecchymosis, easy bruisability  Skin:  No rash, tattoos, scars, edema      PHYSICAL EXAM:   Vital Signs:  Vital Signs Last 24 Hrs  T(C): 36.9 (24 Nov 2018 07:27), Max: 37.1 (23 Nov 2018 17:54)  T(F): 98.5 (24 Nov 2018 07:27), Max: 98.8 (23 Nov 2018 17:54)  HR: 80 (24 Nov 2018 07:27) (66 - 80)  BP: 132/64 (24 Nov 2018 07:27) (132/64 - 186/71)  BP(mean): --  RR: 18 (24 Nov 2018 07:27) (17 - 20)  SpO2: 94% (24 Nov 2018 07:27) (94% - 98%)  Daily Height in cm: 167.64 (23 Nov 2018 17:54)    Daily     GENERAL:  Appears stated age, well-groomed, well-nourished, no distress  HEENT:  NC/AT,  conjunctivae clear and pink, no thyromegaly, nodules, adenopathy, no JVD, sclera -anicteric  CHEST:  Full & symmetric excursion, no increased effort, breath sounds clear  HEART:  Regular rhythm, S1, S2, no murmur/rub/S3/S4, no abdominal bruit, no edema  ABDOMEN:  Soft, obese, R upper quadrant tenderness, non-distended, normoactive bowel sounds  EXTEREMITIES:  no cyanosis,clubbing or edema  SKIN:  No rash/erythema/ecchymoses/petechiae/wounds/abscess/warm/dry  NEURO:  Alert, oriented, no asterixis, no tremor, no encephalopathy    LABS:                        11.2   13.79 )-----------( 238      ( 24 Nov 2018 05:58 )             35.1     11-24    138  |  101  |  13  ----------------------------<  118<H>  3.7   |  28  |  0.62    Ca    8.1<L>      24 Nov 2018 05:58    TPro  7.0  /  Alb  3.0<L>  /  TBili  1.0  /  DBili  .20  /  AST  15  /  ALT  17  /  AlkPhos  67  11-24    LIVER FUNCTIONS - ( 24 Nov 2018 05:58 )  Alb: 3.0 g/dL / Pro: 7.0 g/dL / ALK PHOS: 67 U/L / ALT: 17 U/L / AST: 15 U/L / GGT: x           PT/INR - ( 24 Nov 2018 05:58 )   PT: 12.9 sec;   INR: 1.13 ratio         PTT - ( 24 Nov 2018 05:58 )  PTT:27.4 sec    Amylase Serum--      Lipase [id number]       Ammonia--      Imaging:  < from: CT Abdomen and Pelvis w/ Oral Cont and w/ IV Cont (11.24.18 @ 00:52) >    EXAM:  CT ABDOMEN AND PELVIS OC IC                            PROCEDURE DATE:  11/24/2018          INTERPRETATION:  CLINICAL INFORMATION: Abdominal pain.    TECHNIQUE: Contrast enhanced CT of the abdomen and pelvis was performed   with coronal and sagittal reformats. 96 cc Omnipaque 350 were   administered intravenously and 4 cc were discarded.     COMPARISON: Abdominal ultrasound 11/23/2018.    FINDINGS:    LOWER CHEST: Within normal limits.    HEPATOBILIARY: Large gallstone in the gallbladderneck with gallbladder   wall thickening and trace pericholecystic fat stranding concerning for   cholecystitis. Dilated common bile duct measuring up to 10 mm. No   radiopaque biliary stones. Fatty liver and hepatomegaly.    PANCREAS: Within normal limits.  SPLEEN: Within normal limits.  ADRENALS: Within normal limits.    KIDNEYS/URETERS/BLADDER: Right posterior interpolar renal cyst measuring   5.2 cm. Subcentimeter hypodense right renal lesion not well   characterized. Symmetric renal enhancement. Punctate nonobstructing right   renal calculi. No hydronephrosis or ureteral calculus. Bladder within   normal limits.  REPRODUCTIVE ORGANS: Enlarged, globular uterus suspicious for adenomyosis.    BOWEL/PERITONEUM: No bowel obstruction or pneumoperitoneum.  Normal   appendix. Colonic diverticulosis without diverticulitis. Portions of the   gastrointestinal tract are collapsed, limiting evaluation.     VESSELS:  Moderate calcific atherosclerosis. No abdominal aortic aneurysm.  LYMPHATICS/RETROPERITONEUM: No lymphadenopathy. No retroperitoneal   hematoma.      SOFT TISSUES: Rectus diastasis.  BONES: Multilevel spinal degenerative changes. Grade 1 anterolisthesis at   L4-L5.    IMPRESSION: Large gallstone in the gallbladder neck with gallbladder wall   thickening and trace pericholecystic fat stranding concerning for   cholecystitis. Dilated common bile duct measuring up to 10 mm. No   radiopaque biliary stones. Fatty liver and hepatomegaly.    Correlate with serum bilirubin levels for biliary obstruction. Consider   MRCP/ERCP as indicated.                    CLAIRE BOJORQUEZ M.D., ATTENDING RADIOLOGIST  This document has been electronically signed. Nov 24 2018  1:02AM        < end of copied text >

## 2018-11-24 NOTE — H&P ADULT - ASSESSMENT
72 yo with acute cholecystitis.  D/w pt and daughter need for antibiotics and cholecystectomy. PT reluctant to have surgery at this time. Explained risks that might not improve with antibiotics and worsening infection. Also explained if resolves increased chance of recurrence. PT understands. Would like to yovana it over     cont abx     medicine/card consult     gI consult

## 2018-11-24 NOTE — CONSULT NOTE ADULT - PROBLEM SELECTOR RECOMMENDATION 2
Pt takes ? 50 mg of labetalol at home.  BP is well controlled at present.  Monitor BP and may add BB if needed

## 2018-11-24 NOTE — CONSULT NOTE ADULT - ASSESSMENT
73 yrs old morbidly obese F, with PMHx of ch lymphedema, HTN, dysfunctional uterine bleeding( precancerous uterine hyperplasia, refused for hysterectomy), Ch UTI with urine incontinence, admitted for Ac RUQ pain for 2 days. Pain is radiating to back associated with N/V. Pt is following only cardio Dr Reinoso, has no PMD. Has sedentary life style, walks with cane only few steps.    Pt is diagnosed with Ac renzo and admitted for Cholecystectomy.
72 y/o female with acute cholecystitis and dilated CBD
73 year old woman with obesity, HTN, chronic lymphedema with acute cholecystitis, possible OR:    - Optimized for the OR from a cardiac point of view with no evidence of active ischemic heart disease, decompensated heart failure, severe obstructive valvular disease, or uncontrolled arrhythmia.  - Monitor and replete lytes  - BP controlled off meds  - HR acceptable  - Supplemental oxygen as needed  - To follow with you perioperatively

## 2018-11-25 ENCOUNTER — RESULT REVIEW (OUTPATIENT)
Age: 73
End: 2018-11-25

## 2018-11-25 DIAGNOSIS — E66.01 MORBID (SEVERE) OBESITY DUE TO EXCESS CALORIES: ICD-10-CM

## 2018-11-25 DIAGNOSIS — R10.11 RIGHT UPPER QUADRANT PAIN: ICD-10-CM

## 2018-11-25 DIAGNOSIS — R11.2 NAUSEA WITH VOMITING, UNSPECIFIED: ICD-10-CM

## 2018-11-25 DIAGNOSIS — D72.829 ELEVATED WHITE BLOOD CELL COUNT, UNSPECIFIED: ICD-10-CM

## 2018-11-25 DIAGNOSIS — R50.9 FEVER, UNSPECIFIED: ICD-10-CM

## 2018-11-25 LAB
ALBUMIN SERPL ELPH-MCNC: 2.7 G/DL — LOW (ref 3.3–5)
ALP SERPL-CCNC: 67 U/L — SIGNIFICANT CHANGE UP (ref 40–120)
ALT FLD-CCNC: 23 U/L — SIGNIFICANT CHANGE UP (ref 12–78)
ANION GAP SERPL CALC-SCNC: 8 MMOL/L — SIGNIFICANT CHANGE UP (ref 5–17)
APPEARANCE UR: ABNORMAL
AST SERPL-CCNC: 21 U/L — SIGNIFICANT CHANGE UP (ref 15–37)
BILIRUB SERPL-MCNC: 1.2 MG/DL — SIGNIFICANT CHANGE UP (ref 0.2–1.2)
BILIRUB UR-MCNC: ABNORMAL
BUN SERPL-MCNC: 11 MG/DL — SIGNIFICANT CHANGE UP (ref 7–23)
CALCIUM SERPL-MCNC: 8.3 MG/DL — LOW (ref 8.5–10.1)
CHLORIDE SERPL-SCNC: 102 MMOL/L — SIGNIFICANT CHANGE UP (ref 96–108)
CO2 SERPL-SCNC: 29 MMOL/L — SIGNIFICANT CHANGE UP (ref 22–31)
COLOR SPEC: YELLOW — SIGNIFICANT CHANGE UP
CREAT SERPL-MCNC: 0.66 MG/DL — SIGNIFICANT CHANGE UP (ref 0.5–1.3)
DIFF PNL FLD: ABNORMAL
GLUCOSE SERPL-MCNC: 91 MG/DL — SIGNIFICANT CHANGE UP (ref 70–99)
GLUCOSE UR QL: NEGATIVE — SIGNIFICANT CHANGE UP
HCT VFR BLD CALC: 35.9 % — SIGNIFICANT CHANGE UP (ref 34.5–45)
HGB BLD-MCNC: 11.1 G/DL — LOW (ref 11.5–15.5)
KETONES UR-MCNC: ABNORMAL
LEUKOCYTE ESTERASE UR-ACNC: ABNORMAL
MCHC RBC-ENTMCNC: 25.4 PG — LOW (ref 27–34)
MCHC RBC-ENTMCNC: 30.9 GM/DL — LOW (ref 32–36)
MCV RBC AUTO: 82.2 FL — SIGNIFICANT CHANGE UP (ref 80–100)
NITRITE UR-MCNC: POSITIVE
NRBC # BLD: 0 /100 WBCS — SIGNIFICANT CHANGE UP (ref 0–0)
PH UR: 5 — SIGNIFICANT CHANGE UP (ref 5–8)
PLATELET # BLD AUTO: 206 K/UL — SIGNIFICANT CHANGE UP (ref 150–400)
POTASSIUM SERPL-MCNC: 3.7 MMOL/L — SIGNIFICANT CHANGE UP (ref 3.5–5.3)
POTASSIUM SERPL-SCNC: 3.7 MMOL/L — SIGNIFICANT CHANGE UP (ref 3.5–5.3)
PROT SERPL-MCNC: 6.7 G/DL — SIGNIFICANT CHANGE UP (ref 6–8.3)
PROT UR-MCNC: 25 MG/DL
RBC # BLD: 4.37 M/UL — SIGNIFICANT CHANGE UP (ref 3.8–5.2)
RBC # FLD: 14.5 % — SIGNIFICANT CHANGE UP (ref 10.3–14.5)
SODIUM SERPL-SCNC: 139 MMOL/L — SIGNIFICANT CHANGE UP (ref 135–145)
SP GR SPEC: 1.02 — SIGNIFICANT CHANGE UP (ref 1.01–1.02)
UROBILINOGEN FLD QL: 1
WBC # BLD: 20.17 K/UL — HIGH (ref 3.8–10.5)
WBC # FLD AUTO: 20.17 K/UL — HIGH (ref 3.8–10.5)

## 2018-11-25 PROCEDURE — 88304 TISSUE EXAM BY PATHOLOGIST: CPT | Mod: 26

## 2018-11-25 PROCEDURE — 99232 SBSQ HOSP IP/OBS MODERATE 35: CPT

## 2018-11-25 PROCEDURE — 99233 SBSQ HOSP IP/OBS HIGH 50: CPT

## 2018-11-25 RX ORDER — CIPROFLOXACIN LACTATE 400MG/40ML
400 VIAL (ML) INTRAVENOUS EVERY 12 HOURS
Qty: 0 | Refills: 0 | Status: DISCONTINUED | OUTPATIENT
Start: 2018-11-25 | End: 2018-11-29

## 2018-11-25 RX ORDER — HYDROMORPHONE HYDROCHLORIDE 2 MG/ML
0.5 INJECTION INTRAMUSCULAR; INTRAVENOUS; SUBCUTANEOUS
Qty: 0 | Refills: 0 | Status: DISCONTINUED | OUTPATIENT
Start: 2018-11-25 | End: 2018-11-25

## 2018-11-25 RX ORDER — DIPHENHYDRAMINE HCL 50 MG
25 CAPSULE ORAL EVERY 6 HOURS
Qty: 0 | Refills: 0 | Status: DISCONTINUED | OUTPATIENT
Start: 2018-11-25 | End: 2018-11-29

## 2018-11-25 RX ORDER — METOCLOPRAMIDE HCL 10 MG
10 TABLET ORAL ONCE
Qty: 0 | Refills: 0 | Status: DISCONTINUED | OUTPATIENT
Start: 2018-11-25 | End: 2018-11-25

## 2018-11-25 RX ORDER — OXYCODONE AND ACETAMINOPHEN 5; 325 MG/1; MG/1
2 TABLET ORAL EVERY 4 HOURS
Qty: 0 | Refills: 0 | Status: DISCONTINUED | OUTPATIENT
Start: 2018-11-25 | End: 2018-11-28

## 2018-11-25 RX ORDER — NYSTATIN CREAM 100000 [USP'U]/G
1 CREAM TOPICAL
Qty: 0 | Refills: 0 | Status: DISCONTINUED | OUTPATIENT
Start: 2018-11-25 | End: 2018-11-29

## 2018-11-25 RX ORDER — ONDANSETRON 8 MG/1
4 TABLET, FILM COATED ORAL EVERY 6 HOURS
Qty: 0 | Refills: 0 | Status: DISCONTINUED | OUTPATIENT
Start: 2018-11-25 | End: 2018-11-29

## 2018-11-25 RX ORDER — ACETAMINOPHEN 500 MG
1000 TABLET ORAL ONCE
Qty: 0 | Refills: 0 | Status: COMPLETED | OUTPATIENT
Start: 2018-11-26 | End: 2018-11-26

## 2018-11-25 RX ORDER — OXYCODONE HYDROCHLORIDE 5 MG/1
10 TABLET ORAL ONCE
Qty: 0 | Refills: 0 | Status: DISCONTINUED | OUTPATIENT
Start: 2018-11-25 | End: 2018-11-25

## 2018-11-25 RX ORDER — OXYCODONE HYDROCHLORIDE 5 MG/1
5 TABLET ORAL ONCE
Qty: 0 | Refills: 0 | Status: DISCONTINUED | OUTPATIENT
Start: 2018-11-25 | End: 2018-11-25

## 2018-11-25 RX ORDER — METRONIDAZOLE 500 MG
500 TABLET ORAL EVERY 8 HOURS
Qty: 0 | Refills: 0 | Status: DISCONTINUED | OUTPATIENT
Start: 2018-11-25 | End: 2018-11-29

## 2018-11-25 RX ORDER — OXYCODONE AND ACETAMINOPHEN 5; 325 MG/1; MG/1
1 TABLET ORAL EVERY 4 HOURS
Qty: 0 | Refills: 0 | Status: DISCONTINUED | OUTPATIENT
Start: 2018-11-25 | End: 2018-11-28

## 2018-11-25 RX ORDER — LABETALOL HCL 100 MG
100 TABLET ORAL
Qty: 0 | Refills: 0 | Status: DISCONTINUED | OUTPATIENT
Start: 2018-11-25 | End: 2018-11-26

## 2018-11-25 RX ORDER — LABETALOL HCL 100 MG
50 TABLET ORAL ONCE
Qty: 0 | Refills: 0 | Status: COMPLETED | OUTPATIENT
Start: 2018-11-25 | End: 2018-11-25

## 2018-11-25 RX ORDER — ACETAMINOPHEN 500 MG
650 TABLET ORAL ONCE
Qty: 0 | Refills: 0 | Status: COMPLETED | OUTPATIENT
Start: 2018-11-25 | End: 2018-11-25

## 2018-11-25 RX ORDER — SODIUM CHLORIDE 9 MG/ML
1000 INJECTION, SOLUTION INTRAVENOUS
Qty: 0 | Refills: 0 | Status: DISCONTINUED | OUTPATIENT
Start: 2018-11-25 | End: 2018-11-25

## 2018-11-25 RX ORDER — LABETALOL HCL 100 MG
100 TABLET ORAL ONCE
Qty: 0 | Refills: 0 | Status: DISCONTINUED | OUTPATIENT
Start: 2018-11-25 | End: 2018-11-25

## 2018-11-25 RX ORDER — ENOXAPARIN SODIUM 100 MG/ML
40 INJECTION SUBCUTANEOUS DAILY
Qty: 0 | Refills: 0 | Status: DISCONTINUED | OUTPATIENT
Start: 2018-11-25 | End: 2018-11-29

## 2018-11-25 RX ORDER — MORPHINE SULFATE 50 MG/1
4 CAPSULE, EXTENDED RELEASE ORAL EVERY 4 HOURS
Qty: 0 | Refills: 0 | Status: DISCONTINUED | OUTPATIENT
Start: 2018-11-25 | End: 2018-11-28

## 2018-11-25 RX ORDER — SODIUM CHLORIDE 9 MG/ML
1000 INJECTION INTRAMUSCULAR; INTRAVENOUS; SUBCUTANEOUS
Qty: 0 | Refills: 0 | Status: DISCONTINUED | OUTPATIENT
Start: 2018-11-25 | End: 2018-11-26

## 2018-11-25 RX ADMIN — Medication 650 MILLIGRAM(S): at 02:30

## 2018-11-25 RX ADMIN — Medication 50 MILLIGRAM(S): at 15:17

## 2018-11-25 RX ADMIN — SODIUM CHLORIDE 100 MILLILITER(S): 9 INJECTION, SOLUTION INTRAVENOUS at 18:39

## 2018-11-25 RX ADMIN — MORPHINE SULFATE 2 MILLIGRAM(S): 50 CAPSULE, EXTENDED RELEASE ORAL at 07:46

## 2018-11-25 RX ADMIN — SODIUM CHLORIDE 100 MILLILITER(S): 9 INJECTION INTRAMUSCULAR; INTRAVENOUS; SUBCUTANEOUS at 05:48

## 2018-11-25 RX ADMIN — Medication 200 MILLIGRAM(S): at 07:17

## 2018-11-25 RX ADMIN — Medication 100 MILLIGRAM(S): at 22:23

## 2018-11-25 RX ADMIN — Medication 100 MILLIGRAM(S): at 05:41

## 2018-11-25 RX ADMIN — MORPHINE SULFATE 2 MILLIGRAM(S): 50 CAPSULE, EXTENDED RELEASE ORAL at 05:48

## 2018-11-25 RX ADMIN — NYSTATIN CREAM 1 APPLICATION(S): 100000 CREAM TOPICAL at 05:41

## 2018-11-25 RX ADMIN — Medication 100 MILLIGRAM(S): at 13:14

## 2018-11-25 RX ADMIN — Medication 650 MILLIGRAM(S): at 01:06

## 2018-11-25 NOTE — PROGRESS NOTE ADULT - SUBJECTIVE AND OBJECTIVE BOX
CC/F/U for: acute cholecystitis    HPI:  pt is a 74 yo female presents with abdominal pain and vomiting x 1 day. PT was in us[facility] until yesterday when she developed upper abdominal pain and vomiting. This was after eating a fatty meal. Vomiting continued and pain localized to RUQ.    denies any fever/chills.  PAin still present (2018 08:37)      INTERVAL HPI/OVERNIGHT EVENTS:  Pt seen and examined at bedside - daughter at bedside, has lots of questions, wants pt taken to OR 'now'     Allergies/Intolerance: penicillins (Anaphylaxis)      MEDICATIONS  (STANDING):  ciprofloxacin   IVPB 400 milliGRAM(s) IV Intermittent every 12 hours  ciprofloxacin   IVPB      enoxaparin Injectable 40 milliGRAM(s) SubCutaneous daily  labetalol 50 milliGRAM(s) Oral once  metroNIDAZOLE  IVPB      metroNIDAZOLE  IVPB 500 milliGRAM(s) IV Intermittent every 8 hours  nystatin Powder 1 Application(s) Topical two times a day  sodium chloride 0.9%. 1000 milliLiter(s) (100 mL/Hr) IV Continuous <Continuous>    MEDICATIONS  (PRN):  diphenhydrAMINE   Injectable 25 milliGRAM(s) IV Push every 6 hours PRN insomnia, and/or itch  morphine  - Injectable 2 milliGRAM(s) IV Push every 4 hours PRN Moderate Pain (4 - 6)  ondansetron Injectable 4 milliGRAM(s) IV Push every 6 hours PRN Nausea and/or Vomiting        ROS: as above; all other systems reviewed and wnl      PHYSICAL EXAMINATION:  Vital Signs Last 24 Hrs  T(C): 37 (2018 14:34), Max: 38.3 (2018 00:14)  T(F): 98.6 (2018 14:34), Max: 101 (2018 00:14)  HR: 94 (2018 14:34) (80 - 94)  BP: 168/79 (2018 14:34) (131/69 - 168/79)  RR: 18 (2018 14:34) (18 - 19)  SpO2: 95% (2018 14:34) (91% - 95%)    GENERAL: morbidly obese, elderly female, lying in bed, NAD  HEAD:  atraumatic, normocephalic  EYES: sclera anicteric  ENMT: mucous membranes dry  NECK: supple  CHEST/LUNG: respirations unlabored; air entry symmetric; clear to auscultation bilaterally; no wheezing  HEART: normal S1, S2  ABDOMEN: morbidly obese, BS+, soft, ND, mild ttp RUQ, no rebound, no guarding  EXTREMITIES:  chronic lymphadematous changes with thickened, lichenified skin b/l Les  NEURO: awake, alert, interactive; moves all extremities      LABS:                        11.1   20.17 )-----------( 206      ( 2018 06:52 )             35.9         139  |  102  |  11  ----------------------------<  91  3.7   |  29  |  0.66    Ca    8.3<L>      2018 06:52    TPro  6.7  /  Alb  2.7<L>  /  TBili  1.2  /  DBili  x   /  AST  21  /  ALT  23  /  AlkPhos  67      PT/INR - ( 2018 05:58 )   PT: 12.9 sec;   INR: 1.13 ratio         PTT - ( 2018 05:58 )  PTT:27.4 sec  Urinalysis Basic - ( 2018 07:52 )    Color: Yellow / Appearance: Slightly Turbid / S.020 / pH: x  Gluc: x / Ketone: Small  / Bili: Small / Urobili: 1   Blood: x / Protein: 25 mg/dL / Nitrite: Positive   Leuk Esterase: Small / RBC: 25-50 /HPF / WBC 3-5   Sq Epi: x / Non Sq Epi: Moderate / Bacteria: Few

## 2018-11-25 NOTE — PROGRESS NOTE ADULT - ASSESSMENT
73F, morbid obesity, HTN, chronic lymphedema, hx dysfunctional uterine bleeding (pt refused gyn w/u and hysterectomy), chronic UTIs on prophylactic macrobid; adm w/RUQ pain, n/v, abd u/s w/dilated CBD, cholelithiasis, CTAP w/cholelith, dilated CBD, and pericholecystic changes c/f acute cholecystitis, WBC 14.48; on IV abxs, planned for surgical intervention     acute cholecystitis, RUQ pain, n/v  -IV abxs cvrg w/cipro, flagyl - pt w/PCN allergy  -f/u cxs  -timing of surgical intervention as per Surgery team  -leukocytosis - in setting of acute infection  -fever 101 overnight 11/24 - in setting of acute infection  -prn analgesia  -Cardiology has cleared for surgical intervention    chronic UTI - pt currently w/o acute dysuric sxs, and UA 11/25 is contaminated sample - no need to repeat at this time, as pt asymptomatic for UTI, and already on cipro coverage    HTN - monitoring off home BP meds given risk of hypoTN w/volume depletion, acute infection    chronic lymphedema - stable; no e/o acute infection    dvt prophy    ALL DIAGNOSES,MGMT PLANS REVIEWED IN EXTENSIVE DETAIL AND AT LENGTH WITH PT/DAUGHTER AT BEDSIDE; ALL QUESTIONS ANSWERED COMPREHENSIVELY

## 2018-11-25 NOTE — PROGRESS NOTE ADULT - SUBJECTIVE AND OBJECTIVE BOX
[facility] - Yuval Angela, Marcela, Peter, Sumaya, Trinity Reese  Office Number:  [phone]    Patient resting comfortably in bed in NAD.  Laying flat with no respiratory distress.  No complaints of chest pain, dyspnea, palpitations, PND, or orthopnea.    F/U for:  HTN      MEDICATIONS  (STANDING):  ciprofloxacin   IVPB 400 milliGRAM(s) IV Intermittent every 12 hours  ciprofloxacin   IVPB      enoxaparin Injectable 40 milliGRAM(s) SubCutaneous daily  metroNIDAZOLE  IVPB 500 milliGRAM(s) IV Intermittent every 8 hours  metroNIDAZOLE  IVPB      nystatin Powder 1 Application(s) Topical two times a day  sodium chloride 0.9%. 1000 milliLiter(s) (100 mL/Hr) IV Continuous <Continuous>    MEDICATIONS  (PRN):  diphenhydrAMINE   Injectable 25 milliGRAM(s) IV Push every 6 hours PRN insomnia, and/or itch  morphine  - Injectable 2 milliGRAM(s) IV Push every 4 hours PRN Moderate Pain (4 - 6)  ondansetron Injectable 4 milliGRAM(s) IV Push every 6 hours PRN Nausea and/or Vomiting      Allergies    penicillins (Anaphylaxis)    Intolerances        Vital Signs Last 24 Hrs  T(C): 37.2 (25 Nov 2018 07:36), Max: 38.3 (25 Nov 2018 00:14)  T(F): 99 (25 Nov 2018 07:36), Max: 101 (25 Nov 2018 00:14)  HR: 85 (25 Nov 2018 07:36) (80 - 90)  BP: 131/69 (25 Nov 2018 07:36) (131/69 - 152/63)  BP(mean): --  RR: 18 (25 Nov 2018 07:36) (18 - 19)  SpO2: 91% (25 Nov 2018 07:36) (91% - 93%)    I&O's Summary    24 Nov 2018 07:01  -  25 Nov 2018 07:00  --------------------------------------------------------  IN: 300 mL / OUT: 0 mL / NET: 300 mL    25 Nov 2018 07:01  -  25 Nov 2018 11:59  --------------------------------------------------------  IN: 400 mL / OUT: 650 mL / NET: -250 mL        ON EXAM:    Constitutional: NAD, alert and oriented x 3  Lungs:  Non-labored, breath sounds are clear bilaterally, No wheezing, rales or rhonchi.  Decreased breath sounds b/l.  Cardiovascular: RRR.  S1 and S2 positive.  distant  Gastrointestinal: Bowel Sounds present, soft, nontender. Obese  Lymph: Chronic LE peripheral edema. No cervical lymphadenopathy.  Neurological: Alert, no focal deficits  Skin: No rashes or ulcers   Psych:  Mood & affect appropriate.    LABS: All Labs Reviewed:                        11.1 20.17 )-----------( 206      ( 25 Nov 2018 06:52 )             35.9                         11.2   13.79 )-----------( 238      ( 24 Nov 2018 05:58 )             35.1                         13.0   14.48 )-----------( 273      ( 23 Nov 2018 20:37 )             40.7     25 Nov 2018 06:52    139    |  102    |  11     ----------------------------<  91     3.7     |  29     |  0.66   24 Nov 2018 05:58    138    |  101    |  13     ----------------------------<  118    3.7     |  28     |  0.62   23 Nov 2018 20:37    137    |  99     |  16     ----------------------------<  127    4.1     |  30     |  0.66     Ca    8.3        25 Nov 2018 06:52  Ca    8.1        24 Nov 2018 05:58  Ca    9.4        23 Nov 2018 20:37    TPro  6.7    /  Alb  2.7    /  TBili  1.2    /  DBili  x      /  AST  21     /  ALT  23     /  AlkPhos  67     25 Nov 2018 06:52  TPro  7.0    /  Alb  3.0    /  TBili  1.0    /  DBili  .20    /  AST  15     /  ALT  17     /  AlkPhos  67     24 Nov 2018 05:58  TPro  8.3    /  Alb  3.7    /  TBili  0.7    /  DBili  x      /  AST  24     /  ALT  20     /  AlkPhos  82     23 Nov 2018 20:37    PT/INR - ( 24 Nov 2018 05:58 )   PT: 12.9 sec;   INR: 1.13 ratio         PTT - ( 24 Nov 2018 05:58 )  PTT:27.4 sec  CARDIAC MARKERS ( 23 Nov 2018 20:37 )  <.015 ng/mL / x     / x     / x     / x          Blood Culture:   11-23 @ 20:37  Pro Bnp 98

## 2018-11-25 NOTE — BRIEF OPERATIVE NOTE - PROCEDURE
<<-----Click on this checkbox to enter Procedure Laparoscopic cholecystectomy  11/25/2018    Active  SOREN

## 2018-11-25 NOTE — PROGRESS NOTE ADULT - ASSESSMENT
72 yo with acute cholecystitis.  WBC fermin to 20,  but improving overall.       cont abx     will re-evaluate in PM, if worsens will do today, if stable, will proceed tomorrow

## 2018-11-25 NOTE — PROGRESS NOTE ADULT - ASSESSMENT
73 year old woman with obesity, HTN, chronic lymphedema with acute cholecystitis, possible OR:    - Optimized for the OR from a cardiac point of view with no evidence of active ischemic heart disease, decompensated heart failure, severe obstructive valvular disease, or uncontrolled arrhythmia.  - Monitor and replete lytes  - BP controlled off meds  - HR acceptable  - Supplemental oxygen as needed  - To follow with you perioperatively

## 2018-11-25 NOTE — PROGRESS NOTE ADULT - SUBJECTIVE AND OBJECTIVE BOX
pt seen  states feeling better  still pain but improving  -n-v  ICU Vital Signs Last 24 Hrs  T(C): 37.2 (2018 07:36), Max: 38.3 (2018 00:14)  T(F): 99 (2018 07:36), Max: 101 (2018 00:14)  HR: 85 (2018 07:36) (80 - 90)  BP: 131/69 (2018 07:36) (131/69 - 152/63)  BP(mean): --  ABP: --  ABP(mean): --  RR: 18 (2018 07:36) (18 - 19)  SpO2: 91% (2018 07:36) (91% - 93%)  gen-NAD  resp-clear  abd-soft ND, RUQ tenderness, less                          11.1   20.17 )-----------( 206      ( 2018 06:52 )             35.9     Urinalysis Basic - ( 2018 07:52 )    Color: Yellow / Appearance: Slightly Turbid / S.020 / pH: x  Gluc: x / Ketone: Small  / Bili: Small / Urobili: 1   Blood: x / Protein: 25 mg/dL / Nitrite: Positive   Leuk Esterase: Small / RBC: 25-50 /HPF / WBC 3-5   Sq Epi: x / Non Sq Epi: Moderate / Bacteria: Few

## 2018-11-25 NOTE — PROGRESS NOTE ADULT - SUBJECTIVE AND OBJECTIVE BOX
Overnight events:   Patient seen and examined, still having some abd pain, WBC increased, febrile overnight, tbili within normal limits     Allergies:  penicillins (Anaphylaxis)      Medications:  ciprofloxacin   IVPB 400 milliGRAM(s) IV Intermittent every 12 hours  ciprofloxacin   IVPB      diphenhydrAMINE   Injectable 25 milliGRAM(s) IV Push every 6 hours PRN  enoxaparin Injectable 40 milliGRAM(s) SubCutaneous daily  metroNIDAZOLE  IVPB 500 milliGRAM(s) IV Intermittent once  metroNIDAZOLE  IVPB 500 milliGRAM(s) IV Intermittent every 8 hours  metroNIDAZOLE  IVPB      morphine  - Injectable 2 milliGRAM(s) IV Push every 4 hours PRN  nystatin Powder 1 Application(s) Topical two times a day  ondansetron Injectable 4 milliGRAM(s) IV Push every 6 hours PRN  sodium chloride 0.9%. 1000 milliLiter(s) IV Continuous <Continuous>      PMHX/PSHX:  HTN (hypertension)  No significant past surgical history      Family history:  No pertinent family history in first degree relatives      Social History:     ROS:     General:  No wt loss, fevers, chills, night sweats, fatigue,   Eyes:  Good vision, no reported pain  ENT:  No sore throat, pain, runny nose, dysphagia  CV:  No pain, palpitations, hypo/hypertension  Resp:  No dyspnea, cough, tachypnea, wheezing  GI:  +pain, nausea, vomiting, No diarrhea, No constipation, No weight loss, No fever, No pruritis, No rectal bleeding, No tarry stools, No dysphagia,  :  No pain, bleeding, incontinence, nocturia  Muscle:  No pain, weakness  Neuro:  No weakness, tingling, memory problems  Psych:  No fatigue, insomnia, mood problems, depression  Endocrine:  No polyuria, polydipsia, cold/heat intolerance  Heme:  No petechiae, ecchymosis, easy bruisability  Skin:  No rash, tattoos, scars, edema      PHYSICAL EXAM:   Vital Signs Last 24 Hrs  T(C): 37.2 (25 Nov 2018 07:36), Max: 38.3 (25 Nov 2018 00:14)  T(F): 99 (25 Nov 2018 07:36), Max: 101 (25 Nov 2018 00:14)  HR: 85 (25 Nov 2018 07:36) (80 - 90)  BP: 131/69 (25 Nov 2018 07:36) (131/69 - 152/63)  BP(mean): --  RR: 18 (25 Nov 2018 07:36) (18 - 19)  SpO2: 91% (25 Nov 2018 07:36) (91% - 93%)  Daily     GENERAL:  Appears stated age, well-groomed, well-nourished, no distress  HEENT:  NC/AT,  conjunctivae clear and pink, no thyromegaly, nodules, adenopathy, no JVD, sclera -anicteric  CHEST:  Full & symmetric excursion, no increased effort, breath sounds clear  HEART:  Regular rhythm, S1, S2, no murmur/rub/S3/S4, no abdominal bruit, no edema  ABDOMEN:  Soft, obese, R upper quadrant tenderness, non-distended, normoactive bowel sounds  EXTEREMITIES:  no cyanosis,clubbing or edema  SKIN:  No rash/erythema/ecchymoses/petechiae/wounds/abscess/warm/dry  NEURO:  Alert, oriented, no asterixis, no tremor, no encephalopathy    LABS:                                              11.1   20.17 )-----------( 206      ( 25 Nov 2018 06:52 )             35.9   11-25    139  |  102  |  11  ----------------------------<  91  3.7   |  29  |  0.66    Ca    8.3<L>      25 Nov 2018 06:52    TPro  6.7  /  Alb  2.7<L>  /  TBili  1.2  /  DBili  x   /  AST  21  /  ALT  23  /  AlkPhos  67  11-25      Amylase Serum--      Lipase [id number]       Ammonia--      Imaging:  < from: CT Abdomen and Pelvis w/ Oral Cont and w/ IV Cont (11.24.18 @ 00:52) >    EXAM:  CT ABDOMEN AND PELVIS OC IC                            PROCEDURE DATE:  11/24/2018          INTERPRETATION:  CLINICAL INFORMATION: Abdominal pain.    TECHNIQUE: Contrast enhanced CT of the abdomen and pelvis was performed   with coronal and sagittal reformats. 96 cc Omnipaque 350 were   administered intravenously and 4 cc were discarded.     COMPARISON: Abdominal ultrasound 11/23/2018.    FINDINGS:    LOWER CHEST: Within normal limits.    HEPATOBILIARY: Large gallstone in the gallbladderneck with gallbladder   wall thickening and trace pericholecystic fat stranding concerning for   cholecystitis. Dilated common bile duct measuring up to 10 mm. No   radiopaque biliary stones. Fatty liver and hepatomegaly.    PANCREAS: Within normal limits.  SPLEEN: Within normal limits.  ADRENALS: Within normal limits.    KIDNEYS/URETERS/BLADDER: Right posterior interpolar renal cyst measuring   5.2 cm. Subcentimeter hypodense right renal lesion not well   characterized. Symmetric renal enhancement. Punctate nonobstructing right   renal calculi. No hydronephrosis or ureteral calculus. Bladder within   normal limits.  REPRODUCTIVE ORGANS: Enlarged, globular uterus suspicious for adenomyosis.    BOWEL/PERITONEUM: No bowel obstruction or pneumoperitoneum.  Normal   appendix. Colonic diverticulosis without diverticulitis. Portions of the   gastrointestinal tract are collapsed, limiting evaluation.     VESSELS:  Moderate calcific atherosclerosis. No abdominal aortic aneurysm.  LYMPHATICS/RETROPERITONEUM: No lymphadenopathy. No retroperitoneal   hematoma.      SOFT TISSUES: Rectus diastasis.  BONES: Multilevel spinal degenerative changes. Grade 1 anterolisthesis at   L4-L5.    IMPRESSION: Large gallstone in the gallbladder neck with gallbladder wall   thickening and trace pericholecystic fat stranding concerning for   cholecystitis. Dilated common bile duct measuring up to 10 mm. No   radiopaque biliary stones. Fatty liver and hepatomegaly.    Correlate with serum bilirubin levels for biliary obstruction. Consider   MRCP/ERCP as indicated.                    CLAIRE BOJORQUEZ M.D., ATTENDING RADIOLOGIST  This document has been electronically signed. Nov 24 2018  1:02AM        < end of copied text >

## 2018-11-25 NOTE — BRIEF OPERATIVE NOTE - OPERATION/FINDINGS
Thin walled, markedly enlarged, gangrenous gallbladder with thickened/hypertropic peritoneum.  Pericholecystic fat deposition and edema.

## 2018-11-26 LAB
ANION GAP SERPL CALC-SCNC: 7 MMOL/L — SIGNIFICANT CHANGE UP (ref 5–17)
BASOPHILS # BLD AUTO: 0.03 K/UL — SIGNIFICANT CHANGE UP (ref 0–0.2)
BASOPHILS NFR BLD AUTO: 0.2 % — SIGNIFICANT CHANGE UP (ref 0–2)
BUN SERPL-MCNC: 13 MG/DL — SIGNIFICANT CHANGE UP (ref 7–23)
CALCIUM SERPL-MCNC: 7.7 MG/DL — LOW (ref 8.5–10.1)
CHLORIDE SERPL-SCNC: 104 MMOL/L — SIGNIFICANT CHANGE UP (ref 96–108)
CO2 SERPL-SCNC: 29 MMOL/L — SIGNIFICANT CHANGE UP (ref 22–31)
CREAT SERPL-MCNC: 0.67 MG/DL — SIGNIFICANT CHANGE UP (ref 0.5–1.3)
CULTURE RESULTS: NO GROWTH — SIGNIFICANT CHANGE UP
EOSINOPHIL # BLD AUTO: 0 K/UL — SIGNIFICANT CHANGE UP (ref 0–0.5)
EOSINOPHIL NFR BLD AUTO: 0 % — SIGNIFICANT CHANGE UP (ref 0–6)
GLUCOSE SERPL-MCNC: 159 MG/DL — HIGH (ref 70–99)
HCT VFR BLD CALC: 35.3 % — SIGNIFICANT CHANGE UP (ref 34.5–45)
HGB BLD-MCNC: 11.1 G/DL — LOW (ref 11.5–15.5)
IMM GRANULOCYTES NFR BLD AUTO: 1.3 % — SIGNIFICANT CHANGE UP (ref 0–1.5)
LYMPHOCYTES # BLD AUTO: 0.53 K/UL — LOW (ref 1–3.3)
LYMPHOCYTES # BLD AUTO: 3 % — LOW (ref 13–44)
MCHC RBC-ENTMCNC: 25.8 PG — LOW (ref 27–34)
MCHC RBC-ENTMCNC: 31.4 GM/DL — LOW (ref 32–36)
MCV RBC AUTO: 81.9 FL — SIGNIFICANT CHANGE UP (ref 80–100)
MONOCYTES # BLD AUTO: 1.17 K/UL — HIGH (ref 0–0.9)
MONOCYTES NFR BLD AUTO: 6.7 % — SIGNIFICANT CHANGE UP (ref 2–14)
NEUTROPHILS # BLD AUTO: 15.55 K/UL — HIGH (ref 1.8–7.4)
NEUTROPHILS NFR BLD AUTO: 88.8 % — HIGH (ref 43–77)
PLATELET # BLD AUTO: 218 K/UL — SIGNIFICANT CHANGE UP (ref 150–400)
POTASSIUM SERPL-MCNC: 3.8 MMOL/L — SIGNIFICANT CHANGE UP (ref 3.5–5.3)
POTASSIUM SERPL-SCNC: 3.8 MMOL/L — SIGNIFICANT CHANGE UP (ref 3.5–5.3)
RBC # BLD: 4.31 M/UL — SIGNIFICANT CHANGE UP (ref 3.8–5.2)
RBC # FLD: 14.4 % — SIGNIFICANT CHANGE UP (ref 10.3–14.5)
SODIUM SERPL-SCNC: 140 MMOL/L — SIGNIFICANT CHANGE UP (ref 135–145)
SPECIMEN SOURCE: SIGNIFICANT CHANGE UP
WBC # BLD: 17.51 K/UL — HIGH (ref 3.8–10.5)
WBC # FLD AUTO: 17.51 K/UL — HIGH (ref 3.8–10.5)

## 2018-11-26 PROCEDURE — 99232 SBSQ HOSP IP/OBS MODERATE 35: CPT

## 2018-11-26 PROCEDURE — 99233 SBSQ HOSP IP/OBS HIGH 50: CPT

## 2018-11-26 RX ORDER — NITROFURANTOIN MACROCRYSTAL 50 MG
100 CAPSULE ORAL
Qty: 0 | Refills: 0 | Status: DISCONTINUED | OUTPATIENT
Start: 2018-11-26 | End: 2018-11-26

## 2018-11-26 RX ORDER — ACETAMINOPHEN 500 MG
650 TABLET ORAL EVERY 6 HOURS
Qty: 0 | Refills: 0 | Status: DISCONTINUED | OUTPATIENT
Start: 2018-11-26 | End: 2018-11-28

## 2018-11-26 RX ORDER — LABETALOL HCL 100 MG
50 TABLET ORAL
Qty: 0 | Refills: 0 | Status: DISCONTINUED | OUTPATIENT
Start: 2018-11-26 | End: 2018-11-29

## 2018-11-26 RX ORDER — NITROFURANTOIN MACROCRYSTAL 50 MG
50 CAPSULE ORAL
Qty: 0 | Refills: 0 | Status: DISCONTINUED | OUTPATIENT
Start: 2018-11-26 | End: 2018-11-26

## 2018-11-26 RX ORDER — NITROFURANTOIN MACROCRYSTAL 50 MG
100 CAPSULE ORAL
Qty: 0 | Refills: 0 | Status: DISCONTINUED | OUTPATIENT
Start: 2018-11-26 | End: 2018-11-29

## 2018-11-26 RX ADMIN — Medication 1000 MILLIGRAM(S): at 09:30

## 2018-11-26 RX ADMIN — NYSTATIN CREAM 1 APPLICATION(S): 100000 CREAM TOPICAL at 05:27

## 2018-11-26 RX ADMIN — ENOXAPARIN SODIUM 40 MILLIGRAM(S): 100 INJECTION SUBCUTANEOUS at 11:46

## 2018-11-26 RX ADMIN — Medication 400 MILLIGRAM(S): at 01:04

## 2018-11-26 RX ADMIN — Medication 1000 MILLIGRAM(S): at 02:00

## 2018-11-26 RX ADMIN — Medication 50 MILLIGRAM(S): at 17:58

## 2018-11-26 RX ADMIN — Medication 100 MILLIGRAM(S): at 21:21

## 2018-11-26 RX ADMIN — Medication 1000 MILLIGRAM(S): at 17:56

## 2018-11-26 RX ADMIN — Medication 100 MILLIGRAM(S): at 13:49

## 2018-11-26 RX ADMIN — Medication 100 MILLIGRAM(S): at 05:26

## 2018-11-26 RX ADMIN — Medication 400 MILLIGRAM(S): at 17:24

## 2018-11-26 RX ADMIN — Medication 200 MILLIGRAM(S): at 17:59

## 2018-11-26 RX ADMIN — Medication 200 MILLIGRAM(S): at 06:13

## 2018-11-26 RX ADMIN — Medication 100 MILLIGRAM(S): at 21:24

## 2018-11-26 RX ADMIN — NYSTATIN CREAM 1 APPLICATION(S): 100000 CREAM TOPICAL at 17:58

## 2018-11-26 RX ADMIN — Medication 400 MILLIGRAM(S): at 09:20

## 2018-11-26 NOTE — PROGRESS NOTE ADULT - SUBJECTIVE AND OBJECTIVE BOX
CC/F/U for:     HPI:  pt is a 72 yo female presents with abdominal pain and vomiting x 1 day. PT was in usoh until yesterday when she developed upper abdominal pain and vomiting. This was after eating a fatty meal. Vomiting continued and pain localized to RUQ.    denies any fever/chills.  PAin still present (2018 08:37)        INTERVAL HPI/OVERNIGHT EVENTS:  Pt seen and examined at bedside.     Allergies/Intolerance: penicillins (Anaphylaxis)      MEDICATIONS  (STANDING):  acetaminophen  IVPB .. 1000 milliGRAM(s) IV Intermittent once  ciprofloxacin   IVPB 400 milliGRAM(s) IV Intermittent every 12 hours  enoxaparin Injectable 40 milliGRAM(s) SubCutaneous daily  labetalol 100 milliGRAM(s) Oral two times a day  metroNIDAZOLE  IVPB 500 milliGRAM(s) IV Intermittent every 8 hours  nystatin Powder 1 Application(s) Topical two times a day    MEDICATIONS  (PRN):  diphenhydrAMINE   Injectable 25 milliGRAM(s) IV Push every 6 hours PRN insomnia, and/or itch  morphine  - Injectable 4 milliGRAM(s) IV Push every 4 hours PRN Severe Pain (7 - 10)  ondansetron Injectable 4 milliGRAM(s) IV Push every 6 hours PRN Nausea and/or Vomiting  oxyCODONE    5 mG/acetaminophen 325 mG 1 Tablet(s) Oral every 4 hours PRN Mild Pain (1 - 3)  oxyCODONE    5 mG/acetaminophen 325 mG 2 Tablet(s) Oral every 4 hours PRN Moderate Pain (4 - 6)        ROS: as above; all other systems reviewed and wnl      PHYSICAL EXAMINATION:  Vital Signs Last 24 Hrs  T(C): 37.1 (2018 12:11), Max: 37.7 (2018 18:10)  T(F): 98.7 (2018 12:11), Max: 99.9 (2018 18:10)  HR: 75 (2018 12:11) (66 - 90)  BP: 131/63 (2018 12:11) (106/61 - 205/58)  BP(mean): --  RR: 17 (2018 12:11) (11 - 20)  SpO2: 94% (2018 12:11) (91% - 100%)    GENERAL: morbidly obese, elderly female, lying in bed, NAD  HEAD:  atraumatic, normocephalic  EYES: sclera anicteric  ENMT: mucous membranes dry  NECK: supple  CHEST/LUNG: respirations unlabored; air entry symmetric; clear to auscultation bilaterally; no wheezing  HEART: normal S1, S2  ABDOMEN: morbidly obese, BS+, soft, ND, mild ttp RUQ, no rebound, no guarding  EXTREMITIES:  chronic lymphadematous changes with thickened, lichenified skin b/l Les  NEURO: awake, alert, interactive; moves all extremities      LABS:                        11.1   17.51 )-----------( 218      ( 2018 06:15 )             35.3     -    140  |  104  |  13  ----------------------------<  159<H>  3.8   |  29  |  0.67    Ca    7.7<L>      2018 06:15    TPro  6.7  /  Alb  2.7<L>  /  TBili  1.2  /  DBili  x   /  AST  21  /  ALT  23  /  AlkPhos  67  11-25      Urinalysis Basic - ( 2018 07:52 )    Color: Yellow / Appearance: Slightly Turbid / S.020 / pH: x  Gluc: x / Ketone: Small  / Bili: Small / Urobili: 1   Blood: x / Protein: 25 mg/dL / Nitrite: Positive   Leuk Esterase: Small / RBC: 25-50 /HPF / WBC 3-5   Sq Epi: x / Non Sq Epi: Moderate / Bacteria: Few          RADIOLOGY & ADDITIONAL TESTS:      ASSESSMENT AND PLAN:  73y Female CC/F/U for: acute cholecystitis    HPI:  pt is a 72 yo female presents with abdominal pain and vomiting x 1 day. PT was in usoh until yesterday when she developed upper abdominal pain and vomiting. This was after eating a fatty meal. Vomiting continued and pain localized to RUQ.    denies any fever/chills.  PAin still present (2018 08:37)      INTERVAL HPI/OVERNIGHT EVENTS:  Pt seen and examined at bedside - daughter at bedside - requesting prophylactic macrobid 50bid be restarted.     Allergies/Intolerance: penicillins (Anaphylaxis)      MEDICATIONS  (STANDING):  acetaminophen  IVPB .. 1000 milliGRAM(s) IV Intermittent once  ciprofloxacin   IVPB 400 milliGRAM(s) IV Intermittent every 12 hours  enoxaparin Injectable 40 milliGRAM(s) SubCutaneous daily  labetalol 100 milliGRAM(s) Oral two times a day  metroNIDAZOLE  IVPB 500 milliGRAM(s) IV Intermittent every 8 hours  nystatin Powder 1 Application(s) Topical two times a day    MEDICATIONS  (PRN):  diphenhydrAMINE   Injectable 25 milliGRAM(s) IV Push every 6 hours PRN insomnia, and/or itch  morphine  - Injectable 4 milliGRAM(s) IV Push every 4 hours PRN Severe Pain (7 - 10)  ondansetron Injectable 4 milliGRAM(s) IV Push every 6 hours PRN Nausea and/or Vomiting  oxyCODONE    5 mG/acetaminophen 325 mG 1 Tablet(s) Oral every 4 hours PRN Mild Pain (1 - 3)  oxyCODONE    5 mG/acetaminophen 325 mG 2 Tablet(s) Oral every 4 hours PRN Moderate Pain (4 - 6)        ROS: as above; all other systems reviewed and wnl      PHYSICAL EXAMINATION:  Vital Signs Last 24 Hrs  T(C): 37.1 (2018 12:11), Max: 37.7 (2018 18:10)  T(F): 98.7 (2018 12:11), Max: 99.9 (2018 18:10)  HR: 75 (2018 12:11) (66 - 90)  BP: 131/63 (2018 12:11) (106/61 - 205/58)  RR: 17 (2018 12:11) (11 - 20)  SpO2: 94% (2018 12:11) (91% - 100%)    GENERAL: morbidly obese, elderly female, sitting up in chair, NAD  HEAD:  atraumatic, normocephalic  EYES: sclera anicteric  ENMT: mucous membranes dry  NECK: supple  CHEST/LUNG: respirations unlabored; air entry symmetric; clear to auscultation bilaterally; no wheezing  HEART: normal S1, S2  ABDOMEN: morbidly obese, BS+, soft, ND, mild ttp RUQ, no rebound, no guarding  EXTREMITIES:  chronic lymphadematous changes with thickened, lichenified skin b/l Les  NEURO: awake, alert, interactive; moves all extremities      LABS:                        11.1   17.51 )-----------( 218      ( 2018 06:15 )             35.3     -    140  |  104  |  13  ----------------------------<  159<H>  3.8   |  29  |  0.67    Ca    7.7<L>      2018 06:15    TPro  6.7  /  Alb  2.7<L>  /  TBili  1.2  /  DBili  x   /  AST  21  /  ALT  23  /  AlkPhos  67  11-      Urinalysis Basic - ( 2018 07:52 )    Color: Yellow / Appearance: Slightly Turbid / S.020 / pH: x  Gluc: x / Ketone: Small  / Bili: Small / Urobili: 1   Blood: x / Protein: 25 mg/dL / Nitrite: Positive   Leuk Esterase: Small / RBC: 25-50 /HPF / WBC 3-5   Sq Epi: x / Non Sq Epi: Moderate / Bacteria: Few

## 2018-11-26 NOTE — PROGRESS NOTE ADULT - SUBJECTIVE AND OBJECTIVE BOX
INTERVAL HPI/OVERNIGHT EVENTS:  pt seen and examined  c/o some abd pain, denies n/v, passing gas  per overnight rn no acute gi issues  sp OR yesterday  afebrile overnight labs noted    MEDICATIONS  (STANDING):  acetaminophen  IVPB .. 1000 milliGRAM(s) IV Intermittent once  ciprofloxacin   IVPB 400 milliGRAM(s) IV Intermittent every 12 hours  enoxaparin Injectable 40 milliGRAM(s) SubCutaneous daily  labetalol 100 milliGRAM(s) Oral two times a day  metroNIDAZOLE  IVPB 500 milliGRAM(s) IV Intermittent every 8 hours  nystatin Powder 1 Application(s) Topical two times a day  sodium chloride 0.9%. 1000 milliLiter(s) (100 mL/Hr) IV Continuous <Continuous>    MEDICATIONS  (PRN):  diphenhydrAMINE   Injectable 25 milliGRAM(s) IV Push every 6 hours PRN insomnia, and/or itch  morphine  - Injectable 4 milliGRAM(s) IV Push every 4 hours PRN Severe Pain (7 - 10)  ondansetron Injectable 4 milliGRAM(s) IV Push every 6 hours PRN Nausea and/or Vomiting  oxyCODONE    5 mG/acetaminophen 325 mG 1 Tablet(s) Oral every 4 hours PRN Mild Pain (1 - 3)  oxyCODONE    5 mG/acetaminophen 325 mG 2 Tablet(s) Oral every 4 hours PRN Moderate Pain (4 - 6)      Allergies    penicillins (Anaphylaxis)    Intolerances        Review of Systems:    General:  No wt loss, fevers, chills, night sweats, fatigue   Eyes:  Good vision, no reported pain  ENT:  No sore throat, pain, runny nose, dysphagia  CV:  No pain, palpitations, hypo/hypertension  Resp:  No dyspnea, cough, tachypnea, wheezing  GI:  +pain, No nausea, No vomiting, No diarrhea, No constipation, No weight loss, No fever, No pruritis, No rectal bleeding, No melena, No dysphagia  :  No pain, bleeding, incontinence, nocturia  Muscle:  No pain, weakness  Neuro:  No weakness, tingling, memory problems  Psych:  No fatigue, insomnia, mood problems, depression  Endocrine:  No polyuria, polydypsia, cold/heat intolerance  Heme:  No petechiae, ecchymosis, easy bruisability  Skin:  No rash, tattoos, scars, edema      Vital Signs Last 24 Hrs  T(C): 36.7 (2018 07:45), Max: 37.7 (2018 18:10)  T(F): 98 (2018 07:45), Max: 99.9 (2018 18:10)  HR: 77 (2018 07:45) (66 - 94)  BP: 134/69 (2018 07:45) (106/61 - 205/58)  BP(mean): --  RR: 19 (2018 07:45) (11 - 20)  SpO2: 92% (2018 07:45) (91% - 100%)    PHYSICAL EXAM:    Constitutional: NAD lying in bed  HEENT: ncat  Neck: No LAD  Respiratory: dec bs  Cardiovascular: S1 and S2, RRR,  Gastrointestinal: obese abd soft mild incisional ttp nd incisions c/d/i  Extremities: chronic lymphedema  Vascular: 2+ peripheral pulses  Neurological: Awake alert responds appropriately  Skin: No rashes      LABS:                        11.1   17.51 )-----------( 218      ( 2018 06:15 )             35.3     11-    140  |  104  |  13  ----------------------------<  159<H>  3.8   |  29  |  0.67    Ca    7.7<L>      2018 06:15    TPro  6.7  /  Alb  2.7<L>  /  TBili  1.2  /  DBili  x   /  AST  21  /  ALT  23  /  AlkPhos  67  11-25      Urinalysis Basic - ( 2018 07:52 )    Color: Yellow / Appearance: Slightly Turbid / S.020 / pH: x  Gluc: x / Ketone: Small  / Bili: Small / Urobili: 1   Blood: x / Protein: 25 mg/dL / Nitrite: Positive   Leuk Esterase: Small / RBC: 25-50 /HPF / WBC 3-5   Sq Epi: x / Non Sq Epi: Moderate / Bacteria: Few        RADIOLOGY & ADDITIONAL TESTS:

## 2018-11-26 NOTE — PROGRESS NOTE ADULT - SUBJECTIVE AND OBJECTIVE BOX
pt seen  feeling good  c/o some pain RUQ  -n-v  slight flatus  Vital Signs Last 24 Hrs  T(C): 37.1 (26 Nov 2018 10:04), Max: 37.7 (25 Nov 2018 18:10)  T(F): 98.8 (26 Nov 2018 10:04), Max: 99.9 (25 Nov 2018 18:10)  HR: 79 (26 Nov 2018 10:04) (66 - 94)  BP: 139/65 (26 Nov 2018 10:04) (106/61 - 205/58)  BP(mean): --  RR: 16 (26 Nov 2018 10:04) (11 - 20)  SpO2: 94% (26 Nov 2018 10:04) (91% - 100%)  gen-NAD  resp-clear  abd-soft, Mild tenderness RUQ  incision c/d/i                          11.1   17.51 )-----------( 218      ( 26 Nov 2018 06:15 )             35.3

## 2018-11-26 NOTE — PROGRESS NOTE ADULT - SUBJECTIVE AND OBJECTIVE BOX
DEPARTMENT OF ANESTHESIA  POST ANESTHETIC EVALUATION    The Patient was interviewed and evaluated.  The patient is S/P a lap cholecystectomy    Vital Signs Last 24 Hrs  T(C): 36.5 (26 Nov 2018 04:52), Max: 37.7 (25 Nov 2018 18:10)  T(F): 97.7 (26 Nov 2018 04:52), Max: 99.9 (25 Nov 2018 18:10)  HR: 71 (26 Nov 2018 04:52) (66 - 94)  BP: 150/72 (26 Nov 2018 04:52) (106/61 - 205/58)  BP(mean): --  RR: 16 (26 Nov 2018 04:52) (11 - 20)  SpO2: 92% (26 Nov 2018 04:52) (91% - 100%)    Evaluation:  The patient is doing  well    (x ) No apparent complications or complaints regarding anesthesia care at this time  (x ) All questions were answered    Condition:  (x ) Stable      ( ) Guarded      ( ) Critical    Recommendations:  (x ) None     ( ) Other:

## 2018-11-26 NOTE — PROGRESS NOTE ADULT - ASSESSMENT
73 year old woman with obesity, HTN, chronic lymphedema with acute cholecystitis, s/p lap/choley    -s/p lap cholecystectomy without cardiac complications  - Monitor and replete lytes  - BP controlled off meds elevated at times possibly related to pain  - HR acceptable  - Supplemental oxygen as needed  - To follow with you perioperatively  Rao Maya ANP  Cardiology

## 2018-11-26 NOTE — PROGRESS NOTE ADULT - ASSESSMENT
73F, morbid obesity, HTN, chronic lymphedema, hx dysfunctional uterine bleeding (pt refused gyn w/u and hysterectomy), recurrent chronic UTIs on prophylactic macrobid; adm w/RUQ pain, n/v, abd u/s w/dilated CBD, cholelithiasis, CTAP w/cholelith, dilated CBD, and pericholecystic changes c/f acute cholecystitis, WBC 14.48; on IV abxs s/p surgical intervention     acute cholecystitis, RUQ pain, n/v - s/p cholecystectomy 11/25  -IV abxs cvrg w/cipro, flagyl - pt w/PCN allergy  -f/u cxs  -leukocytosis - in setting of acute infection - downtrended slightly - monitoring for need to adjust abxs  -last fever 101 midnight 11/25 - has been afebrile x at least 24h, continue to monitor in setting of acute infection  -prn analgesia    recurrent UTI - pt currently w/o acute dysuric sxs, and UA 11/25 is contaminated sample - no need to repeat at this time, as pt asymptomatic for UTI - restart macrobid 50bid prophylaxis as per home regimen (pt reports increases to 100bid if having dysuric sxs)    HTN - BP increased - restarted on home dose of labetalol - pt reports actually takes labetalol 50 bid, though prescribed 100 bid - can restart at 50bid    chronic lymphedema - stable; no e/o acute infection    dvt prophy    ALL DIAGNOSES,MGMT PLANS REVIEWED IN EXTENSIVE DETAIL AND AT LENGTH WITH PT/DAUGHTER AT BEDSIDE; ALL QUESTIONS ANSWERED COMPREHENSIVELY

## 2018-11-26 NOTE — PROGRESS NOTE ADULT - SUBJECTIVE AND OBJECTIVE BOX
[facility] -- Yuval Angela, Peter Medrano, Hugh Kennedy Savella  Office # [phone]      Follow Up:  HTN    Subjective/Observations: No events overnight resting comfortably in bed without complaints       REVIEW OF SYSTEMS: All other review of systems is negative unless indicated above    PAST MEDICAL & SURGICAL HISTORY:  HTN (hypertension)  No significant past surgical history      MEDICATIONS  (STANDING):  acetaminophen  IVPB .. 1000 milliGRAM(s) IV Intermittent once  ciprofloxacin   IVPB 400 milliGRAM(s) IV Intermittent every 12 hours  enoxaparin Injectable 40 milliGRAM(s) SubCutaneous daily  labetalol 100 milliGRAM(s) Oral two times a day  metroNIDAZOLE  IVPB 500 milliGRAM(s) IV Intermittent every 8 hours  nystatin Powder 1 Application(s) Topical two times a day    MEDICATIONS  (PRN):  diphenhydrAMINE   Injectable 25 milliGRAM(s) IV Push every 6 hours PRN insomnia, and/or itch  morphine  - Injectable 4 milliGRAM(s) IV Push every 4 hours PRN Severe Pain (7 - 10)  ondansetron Injectable 4 milliGRAM(s) IV Push every 6 hours PRN Nausea and/or Vomiting  oxyCODONE    5 mG/acetaminophen 325 mG 1 Tablet(s) Oral every 4 hours PRN Mild Pain (1 - 3)  oxyCODONE    5 mG/acetaminophen 325 mG 2 Tablet(s) Oral every 4 hours PRN Moderate Pain (4 - 6)      Allergies    penicillins (Anaphylaxis)    Intolerances        Vital Signs Last 24 Hrs  T(C): 37.1 (26 Nov 2018 12:11), Max: 37.7 (25 Nov 2018 18:10)  T(F): 98.7 (26 Nov 2018 12:11), Max: 99.9 (25 Nov 2018 18:10)  HR: 75 (26 Nov 2018 12:11) (66 - 94)  BP: 131/63 (26 Nov 2018 12:11) (106/61 - 205/58)  BP(mean): --  RR: 17 (26 Nov 2018 12:11) (11 - 20)  SpO2: 94% (26 Nov 2018 12:11) (91% - 100%)    I&O's Summary    25 Nov 2018 07:01  -  26 Nov 2018 07:00  --------------------------------------------------------  IN: 2000 mL / OUT: 1350 mL / NET: 650 mL    26 Nov 2018 07:01  -  26 Nov 2018 14:18  --------------------------------------------------------  IN: 700 mL / OUT: 900 mL / NET: -200 mL          PHYSICAL EXAM:  TELE:   Constitutional: NAD, awake and alert, well-developed  HEENT: Moist Mucous Membranes, Anicteric  Pulmonary: Non-labored, breath sounds w/ crackles bilaterally at bases , No wheezing, or rhonchi  Cardiovascular: Regular, S1 and S2 nl, No murmurs, rubs, gallops or clicks  Gastrointestinal: Bowel Sounds present, soft, nontender.   Lymph: No lymphadenopathy. No peripheral edema.  Skin: No visible rashes or ulcers.  Psych:  Mood & affect appropriate    LABS: All Labs Reviewed:                        11.1   17.51 )-----------( 218      ( 26 Nov 2018 06:15 )             35.3                         11.1   20.17 )-----------( 206      ( 25 Nov 2018 06:52 )             35.9                         11.2   13.79 )-----------( 238      ( 24 Nov 2018 05:58 )             35.1     26 Nov 2018 06:15    140    |  104    |  13     ----------------------------<  159    3.8     |  29     |  0.67   25 Nov 2018 06:52    139    |  102    |  11     ----------------------------<  91     3.7     |  29     |  0.66   24 Nov 2018 05:58    138    |  101    |  13     ----------------------------<  118    3.7     |  28     |  0.62     Ca    7.7        26 Nov 2018 06:15  Ca    8.3        25 Nov 2018 06:52  Ca    8.1        24 Nov 2018 05:58    TPro  6.7    /  Alb  2.7    /  TBili  1.2    /  DBili  x      /  AST  21     /  ALT  23     /  AlkPhos  67     25 Nov 2018 06:52  TPro  7.0    /  Alb  3.0    /  TBili  1.0    /  DBili  .20    /  AST  15     /  ALT  17     /  AlkPhos  67     24 Nov 2018 05:58  TPro  8.3    /  Alb  3.7    /  TBili  0.7    /  DBili  x      /  AST  24     /  ALT  20     /  AlkPhos  82     23 Nov 2018 20:37             ECG:  < from: 12 Lead ECG (11.23.18 @ 18:23) >  Ventricular Rate 67 BPM    Atrial Rate 67 BPM    P-R Interval 208 ms    QRS Duration 86 ms    Q-T Interval 396 ms    QTC Calculation(Bezet) 418 ms    P Axis 55 degrees    R Axis -24 degrees    T Axis 50 degrees    Diagnosis Line Normal sinus rhythm  Possible Left atrial enlargement  Left ventricular hypertrophy  Nonspecific ST and T wave abnormality    Confirmed by JOCE KENNEDY ([id number]) on 11/24/2018 9:13:18 AM    < end of copied text >    Echo:    Radiology:  < from: CT Abdomen and Pelvis w/ Oral Cont and w/ IV Cont (11.24.18 @ 00:52) >  EXAM:  CT ABDOMEN AND PELVIS OC IC                            PROCEDURE DATE:  11/24/2018          INTERPRETATION:  CLINICAL INFORMATION: Abdominal pain.    TECHNIQUE: Contrast enhanced CT of the abdomen and pelvis was performed   with coronal and sagittal reformats. 96 cc Omnipaque 350 were   administered intravenously and 4 cc were discarded.     COMPARISON: Abdominal ultrasound 11/23/2018.    FINDINGS:    LOWER CHEST: Within normal limits.    HEPATOBILIARY: Large gallstone in the gallbladderneck with gallbladder   wall thickening and trace pericholecystic fat stranding concerning for   cholecystitis. Dilated common bile duct measuring up to 10 mm. No   radiopaque biliary stones. Fatty liver and hepatomegaly.    PANCREAS: Within normal limits.  SPLEEN: Within normal limits.  ADRENALS: Within normal limits.    KIDNEYS/URETERS/BLADDER: Right posterior interpolar renal cyst measuring   5.2 cm. Subcentimeter hypodense right renal lesion not well   characterized. Symmetric renal enhancement. Punctate nonobstructing right   renal calculi. No hydronephrosis or ureteral calculus. Bladder within   normal limits.  REPRODUCTIVE ORGANS: Enlarged, globular uterus suspicious for adenomyosis.    BOWEL/PERITONEUM: No bowel obstruction or pneumoperitoneum.  Normal   appendix. Colonic diverticulosis without diverticulitis. Portions of the   gastrointestinal tract are collapsed, limiting evaluation.     VESSELS:  Moderate calcific atherosclerosis. No abdominal aortic aneurysm.  LYMPHATICS/RETROPERITONEUM: No lymphadenopathy. No retroperitoneal   hematoma.      SOFT TISSUES: Rectus diastasis.  BONES: Multilevel spinal degenerative changes. Grade 1 anterolisthesis at   L4-L5.    IMPRESSION: Large gallstone in the gallbladder neck with gallbladder wall   thickening and trace pericholecystic fat stranding concerning for   cholecystitis. Dilated common bile duct measuring up to 10 mm. No   radiopaque biliary stones. Fatty liver and hepatomegaly.    Correlate with serum bilirubin levels for biliary obstruction. Consider   MRCP/ERCP as indicated.                    CLAIRE BOJORQUEZ M.D., ATTENDING RADIOLOGIST  This document has been electronically signed. Nov 24 2018  1:02AM    < end of copied text >           < from: Xray Chest 2 Views PA/Lat (11.23.18 @ 20:52) >  XAM:  XR CHEST PA LAT 2V                            PROCEDURE DATE:  11/23/2018          INTERPRETATION:      INDICATION: Chest pain shortness of breath.    PA and lateral views of chest. No previous studies are available for   comparison.    FINDINGS:       The lungs are of equal and symmetric in aeration. The bronchovascular   markings are unremarkable.  There is no acute parenchymal consolidation.    There is no pleural effusion. There is cardiomegaly.  There is thoracic   spondylosis and osteopenia   IMPRESSION:  Clear lungs.    Cardiomegaly.                PRANEETH VALENCIA M.D., ATTENDING RADIOLOGIST  This document has been electronically signed. Nov 23 2018  8:59PM        < end of copied text >  Rao Maya [facility]   Cardiology

## 2018-11-27 LAB
ALBUMIN SERPL ELPH-MCNC: 2.4 G/DL — LOW (ref 3.3–5)
ALP SERPL-CCNC: 68 U/L — SIGNIFICANT CHANGE UP (ref 40–120)
ALT FLD-CCNC: 61 U/L — SIGNIFICANT CHANGE UP (ref 12–78)
ANION GAP SERPL CALC-SCNC: 7 MMOL/L — SIGNIFICANT CHANGE UP (ref 5–17)
AST SERPL-CCNC: 44 U/L — HIGH (ref 15–37)
BILIRUB DIRECT SERPL-MCNC: 0.2 MG/DL — SIGNIFICANT CHANGE UP (ref 0.05–0.2)
BILIRUB INDIRECT FLD-MCNC: 0.4 MG/DL — SIGNIFICANT CHANGE UP (ref 0.2–1)
BILIRUB SERPL-MCNC: 0.6 MG/DL — SIGNIFICANT CHANGE UP (ref 0.2–1.2)
BUN SERPL-MCNC: 16 MG/DL — SIGNIFICANT CHANGE UP (ref 7–23)
CALCIUM SERPL-MCNC: 8.1 MG/DL — LOW (ref 8.5–10.1)
CHLORIDE SERPL-SCNC: 103 MMOL/L — SIGNIFICANT CHANGE UP (ref 96–108)
CO2 SERPL-SCNC: 30 MMOL/L — SIGNIFICANT CHANGE UP (ref 22–31)
CREAT SERPL-MCNC: 0.55 MG/DL — SIGNIFICANT CHANGE UP (ref 0.5–1.3)
GLUCOSE SERPL-MCNC: 111 MG/DL — HIGH (ref 70–99)
HCT VFR BLD CALC: 33.2 % — LOW (ref 34.5–45)
HGB BLD-MCNC: 10.6 G/DL — LOW (ref 11.5–15.5)
MCHC RBC-ENTMCNC: 25.8 PG — LOW (ref 27–34)
MCHC RBC-ENTMCNC: 31.9 GM/DL — LOW (ref 32–36)
MCV RBC AUTO: 80.8 FL — SIGNIFICANT CHANGE UP (ref 80–100)
NRBC # BLD: 0 /100 WBCS — SIGNIFICANT CHANGE UP (ref 0–0)
PLATELET # BLD AUTO: 268 K/UL — SIGNIFICANT CHANGE UP (ref 150–400)
POTASSIUM SERPL-MCNC: 3.4 MMOL/L — LOW (ref 3.5–5.3)
POTASSIUM SERPL-SCNC: 3.4 MMOL/L — LOW (ref 3.5–5.3)
PROT SERPL-MCNC: 6.5 G/DL — SIGNIFICANT CHANGE UP (ref 6–8.3)
RBC # BLD: 4.11 M/UL — SIGNIFICANT CHANGE UP (ref 3.8–5.2)
RBC # FLD: 14.4 % — SIGNIFICANT CHANGE UP (ref 10.3–14.5)
SODIUM SERPL-SCNC: 140 MMOL/L — SIGNIFICANT CHANGE UP (ref 135–145)
WBC # BLD: 13.01 K/UL — HIGH (ref 3.8–10.5)
WBC # FLD AUTO: 13.01 K/UL — HIGH (ref 3.8–10.5)

## 2018-11-27 PROCEDURE — 99233 SBSQ HOSP IP/OBS HIGH 50: CPT

## 2018-11-27 PROCEDURE — 99232 SBSQ HOSP IP/OBS MODERATE 35: CPT

## 2018-11-27 RX ORDER — POLYETHYLENE GLYCOL 3350 17 G/17G
17 POWDER, FOR SOLUTION ORAL DAILY
Qty: 0 | Refills: 0 | Status: DISCONTINUED | OUTPATIENT
Start: 2018-11-28 | End: 2018-11-29

## 2018-11-27 RX ORDER — POTASSIUM CHLORIDE 20 MEQ
40 PACKET (EA) ORAL ONCE
Qty: 0 | Refills: 0 | Status: COMPLETED | OUTPATIENT
Start: 2018-11-27 | End: 2018-11-27

## 2018-11-27 RX ORDER — POLYETHYLENE GLYCOL 3350 17 G/17G
17 POWDER, FOR SOLUTION ORAL ONCE
Qty: 0 | Refills: 0 | Status: COMPLETED | OUTPATIENT
Start: 2018-11-27 | End: 2018-11-27

## 2018-11-27 RX ADMIN — NYSTATIN CREAM 1 APPLICATION(S): 100000 CREAM TOPICAL at 05:33

## 2018-11-27 RX ADMIN — Medication 100 MILLIGRAM(S): at 21:54

## 2018-11-27 RX ADMIN — Medication 650 MILLIGRAM(S): at 12:30

## 2018-11-27 RX ADMIN — NYSTATIN CREAM 1 APPLICATION(S): 100000 CREAM TOPICAL at 17:37

## 2018-11-27 RX ADMIN — Medication 100 MILLIGRAM(S): at 05:32

## 2018-11-27 RX ADMIN — OXYCODONE AND ACETAMINOPHEN 1 TABLET(S): 5; 325 TABLET ORAL at 05:56

## 2018-11-27 RX ADMIN — Medication 100 MILLIGRAM(S): at 10:59

## 2018-11-27 RX ADMIN — Medication 650 MILLIGRAM(S): at 21:55

## 2018-11-27 RX ADMIN — Medication 100 MILLIGRAM(S): at 21:55

## 2018-11-27 RX ADMIN — ENOXAPARIN SODIUM 40 MILLIGRAM(S): 100 INJECTION SUBCUTANEOUS at 11:01

## 2018-11-27 RX ADMIN — POLYETHYLENE GLYCOL 3350 17 GRAM(S): 17 POWDER, FOR SOLUTION ORAL at 13:42

## 2018-11-27 RX ADMIN — Medication 40 MILLIEQUIVALENT(S): at 13:42

## 2018-11-27 RX ADMIN — Medication 100 MILLIGRAM(S): at 13:42

## 2018-11-27 RX ADMIN — Medication 50 MILLIGRAM(S): at 17:37

## 2018-11-27 RX ADMIN — Medication 200 MILLIGRAM(S): at 17:37

## 2018-11-27 RX ADMIN — Medication 200 MILLIGRAM(S): at 05:33

## 2018-11-27 RX ADMIN — Medication 50 MILLIGRAM(S): at 05:33

## 2018-11-27 RX ADMIN — OXYCODONE AND ACETAMINOPHEN 1 TABLET(S): 5; 325 TABLET ORAL at 06:17

## 2018-11-27 RX ADMIN — Medication 650 MILLIGRAM(S): at 11:39

## 2018-11-27 RX ADMIN — Medication 650 MILLIGRAM(S): at 22:14

## 2018-11-27 NOTE — PROGRESS NOTE ADULT - SUBJECTIVE AND OBJECTIVE BOX
[facility] -- Yuval Angela, Peter Medrano, Hugh Shell Savella  Office # [phone]    Follow Up:  Preop eval    Subjective/Observations: Sitting on the chair, comfortable on RA.  Denies pain, nausea, vomiting.  No cardiac events/compliants    REVIEW OF SYSTEMS: All other review of systems is negative unless indicated above    PAST MEDICAL & SURGICAL HISTORY:  HTN (hypertension)  No significant past surgical history    MEDICATIONS  (STANDING):  ciprofloxacin   IVPB 400 milliGRAM(s) IV Intermittent every 12 hours  enoxaparin Injectable 40 milliGRAM(s) SubCutaneous daily  labetalol 50 milliGRAM(s) Oral two times a day  metroNIDAZOLE  IVPB 500 milliGRAM(s) IV Intermittent every 8 hours  nitrofurantoin monohydrate/macrocrystals (MACROBID) 100 milliGRAM(s) Oral two times a day  nystatin Powder 1 Application(s) Topical two times a day    MEDICATIONS  (PRN):  acetaminophen   Tablet .. 650 milliGRAM(s) Oral every 6 hours PRN Temp greater or equal to 38.5C (101.3F), Moderate Pain (4 - 6)  diphenhydrAMINE   Injectable 25 milliGRAM(s) IV Push every 6 hours PRN insomnia, and/or itch  morphine  - Injectable 4 milliGRAM(s) IV Push every 4 hours PRN Severe Pain (7 - 10)  ondansetron Injectable 4 milliGRAM(s) IV Push every 6 hours PRN Nausea and/or Vomiting  oxyCODONE    5 mG/acetaminophen 325 mG 1 Tablet(s) Oral every 4 hours PRN Mild Pain (1 - 3)  oxyCODONE    5 mG/acetaminophen 325 mG 2 Tablet(s) Oral every 4 hours PRN Moderate Pain (4 - 6)    Allergies    penicillins (Anaphylaxis)    Intolerances    Vital Signs Last 24 Hrs  T(C): 36.8 (27 Nov 2018 05:25), Max: 37.2 (26 Nov 2018 16:17)  T(F): 98.3 (27 Nov 2018 05:25), Max: 99 (26 Nov 2018 16:17)  HR: 75 (27 Nov 2018 05:25) (71 - 92)  BP: 176/78 (27 Nov 2018 05:25) (144/70 - 176/78)  BP(mean): --  RR: 17 (27 Nov 2018 05:25) (17 - 17)  SpO2: 92% (27 Nov 2018 05:25) (91% - 94%)    I&O's Summary    26 Nov 2018 07:01  -  27 Nov 2018 07:00  --------------------------------------------------------  IN: 1360 mL / OUT: 1300 mL / NET: 60 mL    PHYSICAL EXAM:  TELE: NSR  Constitutional: NAD, awake and alert, obese  HEENT: Moist Mucous Membranes, Anicteric  Pulmonary: Non-labored, breath sounds are clear bilaterally, No wheezing, rales or rhonchi  Cardiovascular: Regular, S1 and S2, No murmurs, rubs, gallops or clicks  Gastrointestinal: Bowel Sounds present, soft.  Slightly tender on palpation  Lymph: No peripheral edema. + lymphedema LE.  Skin: No visible rashes or ulcers.    Psych:  Mood & affect appropriate    LABS: All Labs Reviewed:                        10.6   13.01 )-----------( 268      ( 27 Nov 2018 08:01 )             33.2                         11.1   17.51 )-----------( 218      ( 26 Nov 2018 06:15 )             35.3                         11.1   20.17 )-----------( 206      ( 25 Nov 2018 06:52 )             35.9     27 Nov 2018 08:01    140    |  103    |  16     ----------------------------<  111    3.4     |  30     |  0.55   26 Nov 2018 06:15    140    |  104    |  13     ----------------------------<  159    3.8     |  29     |  0.67   25 Nov 2018 06:52    139    |  102    |  11     ----------------------------<  91     3.7     |  29     |  0.66     Ca    8.1        27 Nov 2018 08:01  Ca    7.7        26 Nov 2018 06:15  Ca    8.3        25 Nov 2018 06:52    TPro  6.5    /  Alb  2.4    /  TBili  0.6    /  DBili  .20    /  AST  44     /  ALT  61     /  AlkPhos  68     27 Nov 2018 08:01  TPro  6.7    /  Alb  2.7    /  TBili  1.2    /  DBili  x      /  AST  21     /  ALT  23     /  AlkPhos  67     25 Nov 2018 06:52    < from: Xray Chest 2 Views PA/Lat (11.23.18 @ 20:52) >    EXAM:  XR CHEST PA LAT 2V                          PROCEDURE DATE:  11/23/2018      INTERPRETATION:      INDICATION: Chest pain shortness of breath.    PA and lateral views of chest. No previous studies are available for   comparison.    FINDINGS:       The lungs are of equal and symmetric in aeration. The bronchovascular   markings are unremarkable.  There is no acute parenchymal consolidation.    There is no pleural effusion. There is cardiomegaly.  There is thoracic   spondylosis and osteopenia   IMPRESSION:  Clear lungs.    Cardiomegaly.    PRANEETH VALENCIA M.D., ATTENDING RADIOLOGIST  This document has been electronically signed. Nov 23 2018  8:59PM      < end of copied text >

## 2018-11-27 NOTE — PROGRESS NOTE ADULT - SUBJECTIVE AND OBJECTIVE BOX
pt seen  feeling good  ICU Vital Signs Last 24 Hrs  T(C): 36.8 (27 Nov 2018 05:25), Max: 37.2 (26 Nov 2018 16:17)  T(F): 98.3 (27 Nov 2018 05:25), Max: 99 (26 Nov 2018 16:17)  HR: 75 (27 Nov 2018 05:25) (71 - 92)  BP: 176/78 (27 Nov 2018 05:25) (131/63 - 176/78)  BP(mean): --  ABP: --  ABP(mean): --  RR: 17 (27 Nov 2018 05:25) (17 - 17)  SpO2: 92% (27 Nov 2018 05:25) (91% - 94%)  gen-NAD  resp-clear  abd-soft NT?ND  incision c//di                          10.6   13.01 )-----------( 268      ( 27 Nov 2018 08:01 )             33.2

## 2018-11-27 NOTE — CONSULT NOTE ADULT - CONSULT REASON
Pre-op clearance
Dilated CBD
obesity  post op  pain   atelectasis  miley
preoperative assessment, htn

## 2018-11-27 NOTE — PHYSICAL THERAPY INITIAL EVALUATION ADULT - PERTINENT HX OF CURRENT PROBLEM, REHAB EVAL
Pt is a 74 yo female presents with abdominal pain and vomiting x 1 day. PT was in usoh until she developed upper abdominal pain and vomiting. This was after eating a fatty meal. Vomiting continued and pain localized to RUQ.

## 2018-11-27 NOTE — PROGRESS NOTE ADULT - ASSESSMENT
73F, morbid obesity, HTN, chronic lymphedema, hx dysfunctional uterine bleeding (pt refused gyn w/u and hysterectomy), recurrent chronic UTIs on prophylactic macrobid; adm w/RUQ pain, n/v, abd u/s w/dilated CBD, cholelithiasis, CTAP w/cholelith, dilated CBD, and pericholecystic changes c/f acute cholecystitis, WBC 14.48; on IV abxs s/p surgical intervention     acute cholecystitis, RUQ pain, n/v - s/p cholecystectomy 11/25  -IV abxs cvrg w/cipro, flagyl - pt w/PCN allergy  -f/u cxs  -leukocytosis - in setting of acute infection - downtrended slightly - monitoring for need to adjust abxs  -last fever 101 midnight 11/25 - has been afebrile x at least 24h, continue to monitor in setting of acute infection  -apprec pallaitive recs  -prn analgesia    recurrent UTI ruled out - pt currently w/o acute dysuric sxs, and UA 11/25 is contaminated sample - no need to repeat at this time, as pt asymptomatic for UTI - restart macrobid 50bid prophylaxis as per home regimen (pt reports increases to 100bid if having dysuric sxs), ucx negative therefore unlikely acute complicated uti    HTN - BP increased - restarted on home dose of labetalol - pt reports actually takes labetalol 50 bid, though prescribed 100 bid - can restart at 50bid    chronic lymphedema - stable; no e/o acute infection    dvt prophy    ALL DIAGNOSES,MGMT PLANS REVIEWED IN EXTENSIVE DETAIL AND AT LENGTH WITH PT/DAUGHTER AT BEDSIDE; ALL QUESTIONS ANSWERED COMPREHENSIVELY 73F, morbid obesity, HTN, chronic lymphedema, hx dysfunctional uterine bleeding (pt refused gyn w/u and hysterectomy), recurrent chronic UTIs on prophylactic macrobid; adm w/RUQ pain, n/v, abd u/s w/dilated CBD, cholelithiasis, CTAP w/cholelith, dilated CBD, and pericholecystic changes c/f acute cholecystitis, WBC 14.48; on IV abxs s/p surgical intervention     acute cholecystitis, RUQ pain, n/v - s/p cholecystectomy 11/25  -IV abxs cvrg w/cipro, flagyl - pt w/PCN allergy  -f/u cxs  -leukocytosis - in setting of acute infection - downtrended s - monitoring for need to adjust abxs  -last fever 101 midnight 11/25 - has been afebrile x at least 24h, continue to monitor in setting of acute infection  -apprec pallaitive recs, apprec ID Dr. Farias recs  -prn analgesia    recurrent UTI ruled out - pt currently w/o acute dysuric sxs, and UA 11/25 is contaminated sample - no need to repeat at this time, as pt asymptomatic for UTI - restart macrobid 50bid prophylaxis as per home regimen (pt reports increases to 100bid if having dysuric sxs), ucx negative therefore unlikely acute complicated uti     HTN - BP increased - restarted on home dose of labetalol - pt reports actually takes labetalol 50 bid, though prescribed 100 bid - can restart at 50bid    chronic lymphedema - stable; no e/o acute infection    dvt prophy    ALL DIAGNOSES,MGMT PLANS REVIEWED IN EXTENSIVE DETAIL AND AT LENGTH WITH PT/DAUGHTER over extensive 18 minute call ALL QUESTIONS ANSWERED COMPREHENSIVELY

## 2018-11-27 NOTE — PROGRESS NOTE ADULT - SUBJECTIVE AND OBJECTIVE BOX
INTERVAL HPI/OVERNIGHT EVENTS:  pt seen and examined  c/o right sided pain, denies n/v, passing gas  per overnight rn medicated for pain x 1, slept most of night  afebrile overnight labs noted    MEDICATIONS  (STANDING):  ciprofloxacin   IVPB 400 milliGRAM(s) IV Intermittent every 12 hours  enoxaparin Injectable 40 milliGRAM(s) SubCutaneous daily  labetalol 50 milliGRAM(s) Oral two times a day  metroNIDAZOLE  IVPB 500 milliGRAM(s) IV Intermittent every 8 hours  nitrofurantoin monohydrate/macrocrystals (MACROBID) 100 milliGRAM(s) Oral two times a day  nystatin Powder 1 Application(s) Topical two times a day    MEDICATIONS  (PRN):  acetaminophen   Tablet .. 650 milliGRAM(s) Oral every 6 hours PRN Temp greater or equal to 38.5C (101.3F), Moderate Pain (4 - 6)  diphenhydrAMINE   Injectable 25 milliGRAM(s) IV Push every 6 hours PRN insomnia, and/or itch  morphine  - Injectable 4 milliGRAM(s) IV Push every 4 hours PRN Severe Pain (7 - 10)  ondansetron Injectable 4 milliGRAM(s) IV Push every 6 hours PRN Nausea and/or Vomiting  oxyCODONE    5 mG/acetaminophen 325 mG 1 Tablet(s) Oral every 4 hours PRN Mild Pain (1 - 3)  oxyCODONE    5 mG/acetaminophen 325 mG 2 Tablet(s) Oral every 4 hours PRN Moderate Pain (4 - 6)      Allergies    penicillins (Anaphylaxis)    Intolerances        Review of Systems:    General:  No wt loss, fevers, chills, night sweats, fatigue   Eyes:  Good vision, no reported pain  ENT:  No sore throat, pain, runny nose, dysphagia  CV:  No pain, palpitations, hypo/hypertension  Resp:  No dyspnea, cough, tachypnea, wheezing  GI:  +pain, No nausea, No vomiting, No diarrhea, No constipation, No weight loss, No fever, No pruritis, No rectal bleeding, No melena, No dysphagia  :  No pain, bleeding, incontinence, nocturia  Muscle:  No pain, weakness  Neuro:  No weakness, tingling, memory problems  Psych:  No fatigue, insomnia, mood problems, depression  Endocrine:  No polyuria, polydypsia, cold/heat intolerance  Heme:  No petechiae, ecchymosis, easy bruisability  Skin:  No rash, tattoos, scars, edema      Vital Signs Last 24 Hrs  T(C): 36.8 (27 Nov 2018 05:25), Max: 37.2 (26 Nov 2018 16:17)  T(F): 98.3 (27 Nov 2018 05:25), Max: 99 (26 Nov 2018 16:17)  HR: 75 (27 Nov 2018 05:25) (71 - 92)  BP: 176/78 (27 Nov 2018 05:25) (131/63 - 176/78)  BP(mean): --  RR: 17 (27 Nov 2018 05:25) (16 - 17)  SpO2: 92% (27 Nov 2018 05:25) (91% - 94%)    PHYSICAL EXAM:    Constitutional: NAD lying in bed  HEENT: ncat  Neck: No LAD  Respiratory: dec bs  Cardiovascular: S1 and S2, RRR,  Gastrointestinal: obese abd soft mild ttp no guarding no rt nd incisions c/d/i  Extremities: chronic lymphedema  Vascular: 2+ peripheral pulses  Neurological: Awake alert responds appropriately  Skin: No rashes      LABS:                        10.6   13.01 )-----------( 268      ( 27 Nov 2018 08:01 )             33.2     11-27    140  |  103  |  16  ----------------------------<  111<H>  3.4<L>   |  30  |  0.55    Ca    8.1<L>      27 Nov 2018 08:01    TPro  6.5  /  Alb  2.4<L>  /  TBili  0.6  /  DBili  .20  /  AST  44<H>  /  ALT  61  /  AlkPhos  68  11-27          RADIOLOGY & ADDITIONAL TESTS:

## 2018-11-27 NOTE — CONSULT NOTE ADULT - SUBJECTIVE AND OBJECTIVE BOX
Date/Time Patient Seen:  		  Referring MD:   Data Reviewed	       Patient is a 73y old  Female who presents with a chief complaint of cholecystitis (27 Nov 2018 10:43)      Subjective/HPI     PAST MEDICAL & SURGICAL HISTORY:  HTN (hypertension)  No significant past surgical history        Medication list         MEDICATIONS  (STANDING):  ciprofloxacin   IVPB 400 milliGRAM(s) IV Intermittent every 12 hours  enoxaparin Injectable 40 milliGRAM(s) SubCutaneous daily  labetalol 50 milliGRAM(s) Oral two times a day  metroNIDAZOLE  IVPB 500 milliGRAM(s) IV Intermittent every 8 hours  nitrofurantoin monohydrate/macrocrystals (MACROBID) 100 milliGRAM(s) Oral two times a day  nystatin Powder 1 Application(s) Topical two times a day  polyethylene glycol 3350 17 Gram(s) Oral once    MEDICATIONS  (PRN):  acetaminophen   Tablet .. 650 milliGRAM(s) Oral every 6 hours PRN Temp greater or equal to 38.5C (101.3F), Moderate Pain (4 - 6)  diphenhydrAMINE   Injectable 25 milliGRAM(s) IV Push every 6 hours PRN insomnia, and/or itch  morphine  - Injectable 4 milliGRAM(s) IV Push every 4 hours PRN Severe Pain (7 - 10)  ondansetron Injectable 4 milliGRAM(s) IV Push every 6 hours PRN Nausea and/or Vomiting  oxyCODONE    5 mG/acetaminophen 325 mG 1 Tablet(s) Oral every 4 hours PRN Mild Pain (1 - 3)  oxyCODONE    5 mG/acetaminophen 325 mG 2 Tablet(s) Oral every 4 hours PRN Moderate Pain (4 - 6)         Vitals log        ICU Vital Signs Last 24 Hrs  T(C): 36.8 (27 Nov 2018 05:25), Max: 37.2 (26 Nov 2018 16:17)  T(F): 98.3 (27 Nov 2018 05:25), Max: 99 (26 Nov 2018 16:17)  HR: 75 (27 Nov 2018 05:25) (71 - 92)  BP: 176/78 (27 Nov 2018 05:25) (131/63 - 176/78)  BP(mean): --  ABP: --  ABP(mean): --  RR: 17 (27 Nov 2018 05:25) (17 - 17)  SpO2: 92% (27 Nov 2018 05:25) (91% - 94%)           Input and Output:  I&O's Detail    26 Nov 2018 07:01  -  27 Nov 2018 07:00  --------------------------------------------------------  IN:    Oral Fluid: 760 mL    sodium chloride 0.9%: 300 mL    Solution: 100 mL    Solution: 200 mL  Total IN: 1360 mL    OUT:    Voided: 1300 mL  Total OUT: 1300 mL    Total NET: 60 mL          Lab Data                        10.6   13.01 )-----------( 268      ( 27 Nov 2018 08:01 )             33.2     11-27    140  |  103  |  16  ----------------------------<  111<H>  3.4<L>   |  30  |  0.55    Ca    8.1<L>      27 Nov 2018 08:01    TPro  6.5  /  Alb  2.4<L>  /  TBili  0.6  /  DBili  .20  /  AST  44<H>  /  ALT  61  /  AlkPhos  68  11-27            Review of Systems	      Objective     Physical Examination        Pertinent Lab findings & Imaging      Em:  NO   Adequate UO     I&O's Detail    26 Nov 2018 07:01  -  27 Nov 2018 07:00  --------------------------------------------------------  IN:    Oral Fluid: 760 mL    sodium chloride 0.9%: 300 mL    Solution: 100 mL    Solution: 200 mL  Total IN: 1360 mL    OUT:    Voided: 1300 mL  Total OUT: 1300 mL    Total NET: 60 mL               Discussed with:     Cultures:	        Radiology

## 2018-11-27 NOTE — PROGRESS NOTE ADULT - ASSESSMENT
73 year old woman with obesity, HTN, chronic lymphedema with acute cholecystitis, s/p lap/choley    - s/p lap cholecystectomy POD#2 without cardiac complications  - Pain control  - Monitor and replete lytes  - BP controlled off meds elevated at times possibly related to pain  - No cardiac arrhythmias per tele.  Discontinue tele monitoring  - Supplemental oxygen as needed  - Discharge planning underway.  Possibly for MEENU in am  - To follow with you    Alia West NP  Cardiology 73 year old woman with obesity, HTN, chronic lymphedema with acute cholecystitis, s/p lap/choley    - s/p lap cholecystectomy POD#2 without cardiac complications  - Pain control  - Monitor and replete lytes  - BP controlled off meds elevated at times possibly related to pain.  Continue Labetalol.  May increase as necessary  - No cardiac arrhythmias per tele.  Discontinue tele monitoring  - Supplemental oxygen as needed  - Discharge planning underway.  Possibly for MEENU in am  - To follow with you    Alia West NP  Cardiology 73 year old woman with obesity, HTN, chronic lymphedema with acute cholecystitis, s/p lap/choley    - s/p lap cholecystectomy without cardiac complications  - Pain control  - Monitor and replete lytes  - BP controlled off meds elevated at times possibly related to pain.  Continue Labetalol.  May increase as necessary  - No cardiac arrhythmias per tele.  Discontinue tele monitoring  - Supplemental oxygen as needed  - Discharge planning underway.  Possibly for MEENU in am  - To follow with you    Alia West NP  Cardiology

## 2018-11-27 NOTE — PROGRESS NOTE ADULT - SUBJECTIVE AND OBJECTIVE BOX
Patient is a 73y old  Female who presents with a chief complaint of cholecystitis (27 Nov 2018 15:16)      INTERVAL HPI:  OVERNIGHT EVENTS:  T(F): 98.8 (11-27-18 @ 16:18), Max: 98.8 (11-27-18 @ 16:18)  HR: 75 (11-27-18 @ 16:18) (71 - 75)  BP: 153/66 (11-27-18 @ 16:18) (144/70 - 176/78)  RR: 17 (11-27-18 @ 16:18) (17 - 17)  SpO2: 91% (11-27-18 @ 16:18) (91% - 94%)  I&O's Summary    26 Nov 2018 07:01  -  27 Nov 2018 07:00  --------------------------------------------------------  IN: 1360 mL / OUT: 1300 mL / NET: 60 mL    27 Nov 2018 07:01  -  27 Nov 2018 22:10  --------------------------------------------------------  IN: 660 mL / OUT: 200 mL / NET: 460 mL        REVIEW OF SYSTEMS:  CONSTITUTIONAL: No fever, weight loss, or fatigue  EYES: No eye pain, visual disturbances, or discharge  ENMT:  No difficulty hearing, tinnitus, vertigo; No sinus or throat pain  NECK: No pain or stiffness  BREASTS: No pain, masses, or nipple discharge  RESPIRATORY: No cough, wheezing, chills or hemoptysis; No shortness of breath  CARDIOVASCULAR: No chest pain, palpitations, dizziness, or leg swelling  GASTROINTESTINAL: No abdominal or epigastric pain. No nausea, vomiting, or hematemesis; No diarrhea or constipation. No melena or hematochezia.  GENITOURINARY: No dysuria, frequency, hematuria, or incontinence  NEUROLOGICAL: No headaches, memory loss, loss of strength, numbness, or tremors  SKIN: No itching, burning, rashes, or lesions   LYMPH NODES: No enlarged glands  ENDOCRINE: No heat or cold intolerance; No hair loss  MUSCULOSKELETAL: No joint pain or swelling; No muscle, back, or extremity pain  PSYCHIATRIC: No depression, anxiety, mood swings, or difficulty sleeping  HEME/LYMPH: No easy bruising, or bleeding gums  ALLERY AND IMMUNOLOGIC: No hives or eczema    PHYSICAL EXAM:  GENERAL: NAD, well-groomed, well-developed  HEAD:  Atraumatic, Normocephalic  EYES: EOMI, PERRLA, conjunctiva and sclera clear  ENMT: No tonsillar erythema, exudates, or enlargement; Moist mucous membranes, Good dentition, No lesions  NECK: Supple, No JVD, Normal thyroid  NERVOUS SYSTEM:  Alert & Oriented X3, Good concentration; Motor Strength 5/5 B/L upper and lower extremities; DTRs 2+ intact and symmetric  CHEST/LUNG: Clear to percussion bilaterally; No rales, rhonchi, wheezing, or rubs  HEART: Regular rate and rhythm; No murmurs, rubs, or gallops  ABDOMEN: Soft, Nontender, Nondistended; Bowel sounds present  EXTREMITIES:  2+ Peripheral Pulses, No clubbing, cyanosis, or edema  LYMPH: No lymphadenopathy noted  SKIN: No rashes or lesions    LABS:                        10.6   13.01 )-----------( 268      ( 27 Nov 2018 08:01 )             33.2     11-27    140  |  103  |  16  ----------------------------<  111<H>  3.4<L>   |  30  |  0.55    Ca    8.1<L>      27 Nov 2018 08:01    TPro  6.5  /  Alb  2.4<L>  /  TBili  0.6  /  DBili  .20  /  AST  44<H>  /  ALT  61  /  AlkPhos  68  11-27        CAPILLARY BLOOD GLUCOSE          11-25 @ 11:05   No growth  --  --          MEDICATIONS  (STANDING):  ciprofloxacin   IVPB 400 milliGRAM(s) IV Intermittent every 12 hours  enoxaparin Injectable 40 milliGRAM(s) SubCutaneous daily  labetalol 50 milliGRAM(s) Oral two times a day  metroNIDAZOLE  IVPB 500 milliGRAM(s) IV Intermittent every 8 hours  nitrofurantoin monohydrate/macrocrystals (MACROBID) 100 milliGRAM(s) Oral two times a day  nystatin Powder 1 Application(s) Topical two times a day    MEDICATIONS  (PRN):  acetaminophen   Tablet .. 650 milliGRAM(s) Oral every 6 hours PRN Temp greater or equal to 38.5C (101.3F), Moderate Pain (4 - 6)  diphenhydrAMINE   Injectable 25 milliGRAM(s) IV Push every 6 hours PRN insomnia, and/or itch  morphine  - Injectable 4 milliGRAM(s) IV Push every 4 hours PRN Severe Pain (7 - 10)  ondansetron Injectable 4 milliGRAM(s) IV Push every 6 hours PRN Nausea and/or Vomiting  oxyCODONE    5 mG/acetaminophen 325 mG 1 Tablet(s) Oral every 4 hours PRN Mild Pain (1 - 3)  oxyCODONE    5 mG/acetaminophen 325 mG 2 Tablet(s) Oral every 4 hours PRN Moderate Pain (4 - 6) Patient is a 73y old  Female who presents with a chief complaint of cholecystitis (27 Nov 2018 15:16)      INTERVAL HPI: Pt seen and examined. About to ambulate with PT, states she feels well. Denies any acute complaints at this time.     OVERNIGHT EVENTS: none noted  T(F): 98.8 (11-27-18 @ 16:18), Max: 98.8 (11-27-18 @ 16:18)  HR: 75 (11-27-18 @ 16:18) (71 - 75)  BP: 153/66 (11-27-18 @ 16:18) (144/70 - 176/78)  RR: 17 (11-27-18 @ 16:18) (17 - 17)  SpO2: 91% (11-27-18 @ 16:18) (91% - 94%)  I&O's Summary    26 Nov 2018 07:01  -  27 Nov 2018 07:00  --------------------------------------------------------  IN: 1360 mL / OUT: 1300 mL / NET: 60 mL    27 Nov 2018 07:01  -  27 Nov 2018 22:10  --------------------------------------------------------  IN: 660 mL / OUT: 200 mL / NET: 460 mL        ROS: as above; all other systems reviewed and wnl      PHYSICAL EXAMINATION:    GENERAL: morbidly obese, elderly female, sitting up in chair, NAD  HEAD:  atraumatic, normocephalic  EYES: sclera anicteric  ENMT: mucous membranes dry  NECK: supple  CHEST/LUNG: respirations unlabored; air entry symmetric; clear to auscultation bilaterally; no wheezing  HEART: normal S1, S2  ABDOMEN: morbidly obese, BS+, soft, ND, mild ttp RUQ, no rebound, no guarding  EXTREMITIES:  chronic lymphadematous changes with thickened, lichenified skin b/l Les  NEURO: awake, alert, interactive; moves all extremities    LABS:                        10.6 13.01 )-----------( 268      ( 27 Nov 2018 08:01 )             33.2     11-27    140  |  103  |  16  ----------------------------<  111<H>  3.4<L>   |  30  |  0.55    Ca    8.1<L>      27 Nov 2018 08:01    TPro  6.5  /  Alb  2.4<L>  /  TBili  0.6  /  DBili  .20  /  AST  44<H>  /  ALT  61  /  AlkPhos  68  11-27        CAPILLARY BLOOD GLUCOSE          11-25 @ 11:05   No growth  --  --          MEDICATIONS  (STANDING):  ciprofloxacin   IVPB 400 milliGRAM(s) IV Intermittent every 12 hours  enoxaparin Injectable 40 milliGRAM(s) SubCutaneous daily  labetalol 50 milliGRAM(s) Oral two times a day  metroNIDAZOLE  IVPB 500 milliGRAM(s) IV Intermittent every 8 hours  nitrofurantoin monohydrate/macrocrystals (MACROBID) 100 milliGRAM(s) Oral two times a day  nystatin Powder 1 Application(s) Topical two times a day    MEDICATIONS  (PRN):  acetaminophen   Tablet .. 650 milliGRAM(s) Oral every 6 hours PRN Temp greater or equal to 38.5C (101.3F), Moderate Pain (4 - 6)  diphenhydrAMINE   Injectable 25 milliGRAM(s) IV Push every 6 hours PRN insomnia, and/or itch  morphine  - Injectable 4 milliGRAM(s) IV Push every 4 hours PRN Severe Pain (7 - 10)  ondansetron Injectable 4 milliGRAM(s) IV Push every 6 hours PRN Nausea and/or Vomiting  oxyCODONE    5 mG/acetaminophen 325 mG 1 Tablet(s) Oral every 4 hours PRN Mild Pain (1 - 3)  oxyCODONE    5 mG/acetaminophen 325 mG 2 Tablet(s) Oral every 4 hours PRN Moderate Pain (4 - 6)

## 2018-11-28 ENCOUNTER — TRANSCRIPTION ENCOUNTER (OUTPATIENT)
Age: 73
End: 2018-11-28

## 2018-11-28 LAB
ANION GAP SERPL CALC-SCNC: 8 MMOL/L — SIGNIFICANT CHANGE UP (ref 5–17)
BUN SERPL-MCNC: 13 MG/DL — SIGNIFICANT CHANGE UP (ref 7–23)
CALCIUM SERPL-MCNC: 7.6 MG/DL — LOW (ref 8.5–10.1)
CHLORIDE SERPL-SCNC: 102 MMOL/L — SIGNIFICANT CHANGE UP (ref 96–108)
CO2 SERPL-SCNC: 30 MMOL/L — SIGNIFICANT CHANGE UP (ref 22–31)
CREAT SERPL-MCNC: 0.56 MG/DL — SIGNIFICANT CHANGE UP (ref 0.5–1.3)
GLUCOSE SERPL-MCNC: 88 MG/DL — SIGNIFICANT CHANGE UP (ref 70–99)
HCT VFR BLD CALC: 33.7 % — LOW (ref 34.5–45)
HGB BLD-MCNC: 10.6 G/DL — LOW (ref 11.5–15.5)
MCHC RBC-ENTMCNC: 25.6 PG — LOW (ref 27–34)
MCHC RBC-ENTMCNC: 31.5 GM/DL — LOW (ref 32–36)
MCV RBC AUTO: 81.4 FL — SIGNIFICANT CHANGE UP (ref 80–100)
NRBC # BLD: 0 /100 WBCS — SIGNIFICANT CHANGE UP (ref 0–0)
PLATELET # BLD AUTO: 294 K/UL — SIGNIFICANT CHANGE UP (ref 150–400)
POTASSIUM SERPL-MCNC: 3.7 MMOL/L — SIGNIFICANT CHANGE UP (ref 3.5–5.3)
POTASSIUM SERPL-SCNC: 3.7 MMOL/L — SIGNIFICANT CHANGE UP (ref 3.5–5.3)
RBC # BLD: 4.14 M/UL — SIGNIFICANT CHANGE UP (ref 3.8–5.2)
RBC # FLD: 14.4 % — SIGNIFICANT CHANGE UP (ref 10.3–14.5)
SODIUM SERPL-SCNC: 140 MMOL/L — SIGNIFICANT CHANGE UP (ref 135–145)
WBC # BLD: 9.53 K/UL — SIGNIFICANT CHANGE UP (ref 3.8–10.5)
WBC # FLD AUTO: 9.53 K/UL — SIGNIFICANT CHANGE UP (ref 3.8–10.5)

## 2018-11-28 PROCEDURE — 99232 SBSQ HOSP IP/OBS MODERATE 35: CPT

## 2018-11-28 RX ORDER — MULTIVIT WITH MIN/MFOLATE/K2 340-15/3 G
100 POWDER (GRAM) ORAL ONCE
Qty: 0 | Refills: 0 | Status: COMPLETED | OUTPATIENT
Start: 2018-11-28 | End: 2018-11-28

## 2018-11-28 RX ORDER — IBUPROFEN 200 MG
600 TABLET ORAL EVERY 6 HOURS
Qty: 0 | Refills: 0 | Status: DISCONTINUED | OUTPATIENT
Start: 2018-11-28 | End: 2018-11-29

## 2018-11-28 RX ORDER — NITROFURANTOIN MACROCRYSTAL 50 MG
1 CAPSULE ORAL
Qty: 0 | Refills: 0 | COMMUNITY

## 2018-11-28 RX ORDER — ACETAMINOPHEN 500 MG
650 TABLET ORAL EVERY 6 HOURS
Qty: 0 | Refills: 0 | Status: DISCONTINUED | OUTPATIENT
Start: 2018-11-28 | End: 2018-11-29

## 2018-11-28 RX ORDER — OXYCODONE AND ACETAMINOPHEN 5; 325 MG/1; MG/1
2 TABLET ORAL EVERY 4 HOURS
Qty: 0 | Refills: 0 | Status: DISCONTINUED | OUTPATIENT
Start: 2018-11-28 | End: 2018-11-29

## 2018-11-28 RX ORDER — IBUPROFEN 200 MG
400 TABLET ORAL ONCE
Qty: 0 | Refills: 0 | Status: COMPLETED | OUTPATIENT
Start: 2018-11-28 | End: 2018-11-28

## 2018-11-28 RX ORDER — OXYCODONE AND ACETAMINOPHEN 5; 325 MG/1; MG/1
1 TABLET ORAL EVERY 4 HOURS
Qty: 0 | Refills: 0 | Status: DISCONTINUED | OUTPATIENT
Start: 2018-11-28 | End: 2018-11-29

## 2018-11-28 RX ADMIN — NYSTATIN CREAM 1 APPLICATION(S): 100000 CREAM TOPICAL at 17:49

## 2018-11-28 RX ADMIN — Medication 100 MILLIGRAM(S): at 14:02

## 2018-11-28 RX ADMIN — Medication 100 MILLIGRAM(S): at 21:35

## 2018-11-28 RX ADMIN — Medication 100 MILLIGRAM(S): at 05:18

## 2018-11-28 RX ADMIN — Medication 600 MILLIGRAM(S): at 23:59

## 2018-11-28 RX ADMIN — Medication 400 MILLIGRAM(S): at 06:19

## 2018-11-28 RX ADMIN — ENOXAPARIN SODIUM 40 MILLIGRAM(S): 100 INJECTION SUBCUTANEOUS at 14:02

## 2018-11-28 RX ADMIN — Medication 100 MILLIGRAM(S): at 11:23

## 2018-11-28 RX ADMIN — Medication 200 MILLIGRAM(S): at 17:49

## 2018-11-28 RX ADMIN — Medication 200 MILLIGRAM(S): at 05:18

## 2018-11-28 RX ADMIN — Medication 50 MILLIGRAM(S): at 17:49

## 2018-11-28 RX ADMIN — POLYETHYLENE GLYCOL 3350 17 GRAM(S): 17 POWDER, FOR SOLUTION ORAL at 14:03

## 2018-11-28 RX ADMIN — NYSTATIN CREAM 1 APPLICATION(S): 100000 CREAM TOPICAL at 05:25

## 2018-11-28 RX ADMIN — Medication 50 MILLIGRAM(S): at 05:19

## 2018-11-28 NOTE — DISCHARGE NOTE ADULT - PATIENT PORTAL LINK FT
You can access the [facility] Patient Portal, offered by [facility], by registering with the following website: [email or web address]

## 2018-11-28 NOTE — PROGRESS NOTE ADULT - ASSESSMENT
73 year old woman with obesity, HTN, chronic lymphedema with acute cholecystitis, s/p lap/cholecystectomy    - s/p lap cholecystectomy without cardiac complications  - Pain control  - Monitor and replete lytes  - BP controlled off meds elevated at times possibly related to pain.  Continue Labetalol.  May increase as necessary  - Supplemental oxygen as needed  - Discharge planning underway.  - To follow with you  - Outpatient follow up with her cardiologist.

## 2018-11-28 NOTE — DISCHARGE NOTE ADULT - CARE PLAN
Principal Discharge DX:	Cholecystitis  Goal:	recover  Assessment and plan of treatment:	may shower  no heavy lifting

## 2018-11-28 NOTE — PROGRESS NOTE ADULT - SUBJECTIVE AND OBJECTIVE BOX
Date/Time Patient Seen:  		  Referring MD:   Data Reviewed	       Patient is a 73y old  Female who presents with a chief complaint of cholecystitis (28 Nov 2018 09:10)    in bed  seen and examined  vs and meds reviewed  on room air      Subjective/HPI     PAST MEDICAL & SURGICAL HISTORY:  HTN (hypertension)  No significant past surgical history        Medication list         MEDICATIONS  (STANDING):  ciprofloxacin   IVPB 400 milliGRAM(s) IV Intermittent every 12 hours  enoxaparin Injectable 40 milliGRAM(s) SubCutaneous daily  labetalol 50 milliGRAM(s) Oral two times a day  metroNIDAZOLE  IVPB 500 milliGRAM(s) IV Intermittent every 8 hours  nitrofurantoin monohydrate/macrocrystals (MACROBID) 100 milliGRAM(s) Oral two times a day  nystatin Powder 1 Application(s) Topical two times a day  polyethylene glycol 3350 17 Gram(s) Oral daily    MEDICATIONS  (PRN):  acetaminophen   Tablet .. 650 milliGRAM(s) Oral every 6 hours PRN Temp greater or equal to 38.5C (101.3F), Moderate Pain (4 - 6)  diphenhydrAMINE   Injectable 25 milliGRAM(s) IV Push every 6 hours PRN insomnia, and/or itch  morphine  - Injectable 4 milliGRAM(s) IV Push every 4 hours PRN Severe Pain (7 - 10)  ondansetron Injectable 4 milliGRAM(s) IV Push every 6 hours PRN Nausea and/or Vomiting  oxyCODONE    5 mG/acetaminophen 325 mG 1 Tablet(s) Oral every 4 hours PRN Mild Pain (1 - 3)  oxyCODONE    5 mG/acetaminophen 325 mG 2 Tablet(s) Oral every 4 hours PRN Moderate Pain (4 - 6)         Vitals log        ICU Vital Signs Last 24 Hrs  T(C): 36.8 (28 Nov 2018 08:33), Max: 37.1 (27 Nov 2018 16:18)  T(F): 98.2 (28 Nov 2018 08:33), Max: 98.8 (27 Nov 2018 16:18)  HR: 72 (28 Nov 2018 08:33) (70 - 75)  BP: 174/70 (28 Nov 2018 08:33) (149/51 - 174/70)  BP(mean): --  ABP: --  ABP(mean): --  RR: 17 (28 Nov 2018 08:33) (17 - 17)  SpO2: 92% (28 Nov 2018 08:33) (91% - 92%)           Input and Output:  I&O's Detail    27 Nov 2018 07:01  -  28 Nov 2018 07:00  --------------------------------------------------------  IN:    Oral Fluid: 360 mL    Solution: 200 mL    Solution: 100 mL  Total IN: 660 mL    OUT:    Voided: 200 mL  Total OUT: 200 mL    Total NET: 460 mL          Lab Data                        10.6   9.53  )-----------( 294      ( 28 Nov 2018 05:51 )             33.7     11-28    140  |  102  |  13  ----------------------------<  88  3.7   |  30  |  0.56    Ca    7.6<L>      28 Nov 2018 05:51    TPro  6.5  /  Alb  2.4<L>  /  TBili  0.6  /  DBili  .20  /  AST  44<H>  /  ALT  61  /  AlkPhos  68  11-27            Review of Systems	      Objective     Physical Examination    heart s1s2  lung dec BS  abd dec BS  obese  cn grossly int      Pertinent Lab findings & Imaging      Em:  NO   Adequate UO     I&O's Detail    27 Nov 2018 07:01  -  28 Nov 2018 07:00  --------------------------------------------------------  IN:    Oral Fluid: 360 mL    Solution: 200 mL    Solution: 100 mL  Total IN: 660 mL    OUT:    Voided: 200 mL  Total OUT: 200 mL    Total NET: 460 mL               Discussed with:     Cultures:	        Radiology

## 2018-11-28 NOTE — PROGRESS NOTE ADULT - SUBJECTIVE AND OBJECTIVE BOX
[facility] - Yuval Angela, Marcela, Peter, Sumaya, Trinity Reese  Office Number:  [phone]    Patient resting comfortably in bed in NAD.  No complaints of chest pain, dyspnea, palpitations, PND, or orthopnea.    F/U for:  HTN, perioperative assessment      MEDICATIONS  (STANDING):  ciprofloxacin   IVPB 400 milliGRAM(s) IV Intermittent every 12 hours  enoxaparin Injectable 40 milliGRAM(s) SubCutaneous daily  labetalol 50 milliGRAM(s) Oral two times a day  metroNIDAZOLE  IVPB 500 milliGRAM(s) IV Intermittent every 8 hours  nitrofurantoin monohydrate/macrocrystals (MACROBID) 100 milliGRAM(s) Oral two times a day  nystatin Powder 1 Application(s) Topical two times a day  polyethylene glycol 3350 17 Gram(s) Oral daily    MEDICATIONS  (PRN):  acetaminophen   Tablet .. 650 milliGRAM(s) Oral every 6 hours PRN Temp greater or equal to 38.5C (101.3F), Moderate Pain (4 - 6)  diphenhydrAMINE   Injectable 25 milliGRAM(s) IV Push every 6 hours PRN insomnia, and/or itch  morphine  - Injectable 4 milliGRAM(s) IV Push every 4 hours PRN Severe Pain (7 - 10)  ondansetron Injectable 4 milliGRAM(s) IV Push every 6 hours PRN Nausea and/or Vomiting  oxyCODONE    5 mG/acetaminophen 325 mG 1 Tablet(s) Oral every 4 hours PRN Mild Pain (1 - 3)  oxyCODONE    5 mG/acetaminophen 325 mG 2 Tablet(s) Oral every 4 hours PRN Moderate Pain (4 - 6)      Allergies    penicillins (Anaphylaxis)    Intolerances        Vital Signs Last 24 Hrs  T(C): 36.9 (28 Nov 2018 05:38), Max: 37.1 (27 Nov 2018 16:18)  T(F): 98.5 (28 Nov 2018 05:38), Max: 98.8 (27 Nov 2018 16:18)  HR: 72 (28 Nov 2018 05:38) (70 - 75)  BP: 167/76 (28 Nov 2018 05:38) (149/51 - 167/76)  BP(mean): --  RR: 17 (28 Nov 2018 05:38) (17 - 17)  SpO2: 92% (28 Nov 2018 05:38) (91% - 92%)    I&O's Summary    27 Nov 2018 07:01  -  28 Nov 2018 07:00  --------------------------------------------------------  IN: 660 mL / OUT: 200 mL / NET: 460 mL        ON EXAM:    General: NAD, awake and alert, oriented x 3  HEENT: Mucous membranes are moist, anicteric  Lungs: Non-labored, breath sounds are clear bilaterally, No wheezing, rales or rhonchi.  Decreased breath sounds b/l.  Cardiovascular: Regular, S1 and S2, no murmurs, rubs, or gallops.  Distant  Gastrointestinal: Bowel Sounds present, soft, nontender.   Lymph: Chronic b/l lymphedema.    Skin: No rashes or ulcers  Psych:  Mood & affect appropriate    LABS: All Labs Reviewed:                        10.6   9.53  )-----------( 294      ( 28 Nov 2018 05:51 )             33.7                         10.6   13.01 )-----------( 268      ( 27 Nov 2018 08:01 )             33.2                         11.1   17.51 )-----------( 218      ( 26 Nov 2018 06:15 )             35.3     28 Nov 2018 05:51    140    |  102    |  13     ----------------------------<  88     3.7     |  30     |  0.56   27 Nov 2018 08:01    140    |  103    |  16     ----------------------------<  111    3.4     |  30     |  0.55   26 Nov 2018 06:15    140    |  104    |  13     ----------------------------<  159    3.8     |  29     |  0.67     Ca    7.6        28 Nov 2018 05:51  Ca    8.1        27 Nov 2018 08:01  Ca    7.7        26 Nov 2018 06:15    TPro  6.5    /  Alb  2.4    /  TBili  0.6    /  DBili  .20    /  AST  44     /  ALT  61     /  AlkPhos  68     27 Nov 2018 08:01          Blood Culture: Organism --  Gram Stain Blood -- Gram Stain --  Specimen Source .Urine Catheterized  Culture-Blood --

## 2018-11-28 NOTE — PROGRESS NOTE ADULT - SUBJECTIVE AND OBJECTIVE BOX
INTERVAL HPI/OVERNIGHT EVENTS:  pt seen and examined  still w right sided abd pain but slowly improving  denies n/v, passing gas  afebrile overnight labs noted  per overnight rn medicated for pain x1 overnight    MEDICATIONS  (STANDING):  ciprofloxacin   IVPB 400 milliGRAM(s) IV Intermittent every 12 hours  enoxaparin Injectable 40 milliGRAM(s) SubCutaneous daily  labetalol 50 milliGRAM(s) Oral two times a day  metroNIDAZOLE  IVPB 500 milliGRAM(s) IV Intermittent every 8 hours  nitrofurantoin monohydrate/macrocrystals (MACROBID) 100 milliGRAM(s) Oral two times a day  nystatin Powder 1 Application(s) Topical two times a day  polyethylene glycol 3350 17 Gram(s) Oral daily    MEDICATIONS  (PRN):  acetaminophen   Tablet .. 650 milliGRAM(s) Oral every 6 hours PRN Temp greater or equal to 38.5C (101.3F), Moderate Pain (4 - 6)  diphenhydrAMINE   Injectable 25 milliGRAM(s) IV Push every 6 hours PRN insomnia, and/or itch  morphine  - Injectable 4 milliGRAM(s) IV Push every 4 hours PRN Severe Pain (7 - 10)  ondansetron Injectable 4 milliGRAM(s) IV Push every 6 hours PRN Nausea and/or Vomiting  oxyCODONE    5 mG/acetaminophen 325 mG 1 Tablet(s) Oral every 4 hours PRN Mild Pain (1 - 3)  oxyCODONE    5 mG/acetaminophen 325 mG 2 Tablet(s) Oral every 4 hours PRN Moderate Pain (4 - 6)      Allergies    penicillins (Anaphylaxis)    Intolerances        Review of Systems:    General:  No wt loss, fevers, chills, night sweats, fatigue   Eyes:  Good vision, no reported pain  ENT:  No sore throat, pain, runny nose, dysphagia  CV:  No pain, palpitations, hypo/hypertension  Resp:  No dyspnea, cough, tachypnea, wheezing  GI:  improving pain, No nausea, No vomiting, No diarrhea, No constipation, No weight loss, No fever, No pruritis, No rectal bleeding, No melena, No dysphagia  :  No pain, bleeding, incontinence, nocturia  Muscle:  No pain, weakness  Neuro:  No weakness, tingling, memory problems  Psych:  No fatigue, insomnia, mood problems, depression  Endocrine:  No polyuria, polydypsia, cold/heat intolerance  Heme:  No petechiae, ecchymosis, easy bruisability  Skin:  No rash, tattoos, scars, edema      Vital Signs Last 24 Hrs  T(C): 36.8 (28 Nov 2018 08:33), Max: 37.1 (27 Nov 2018 16:18)  T(F): 98.2 (28 Nov 2018 08:33), Max: 98.8 (27 Nov 2018 16:18)  HR: 72 (28 Nov 2018 08:33) (70 - 75)  BP: 174/70 (28 Nov 2018 08:33) (149/51 - 174/70)  BP(mean): --  RR: 17 (28 Nov 2018 08:33) (17 - 17)  SpO2: 92% (28 Nov 2018 08:33) (91% - 92%)    PHYSICAL EXAM:    Constitutional: NAD lying in bed  HEENT: ncat  Neck: No LAD  Respiratory: dec bs  Cardiovascular: S1 and S2, RRR,  Gastrointestinal: obese abd soft mild ttp right sided abd no guarding no rt nd incisions c/d/i  Extremities: chronic lymphedema  Vascular: 2+ peripheral pulses  Neurological: Awake alert responds appropriately  Skin: No rashes      LABS:                        10.6   9.53  )-----------( 294      ( 28 Nov 2018 05:51 )             33.7     11-28    140  |  102  |  13  ----------------------------<  88  3.7   |  30  |  0.56    Ca    7.6<L>      28 Nov 2018 05:51    TPro  6.5  /  Alb  2.4<L>  /  TBili  0.6  /  DBili  .20  /  AST  44<H>  /  ALT  61  /  AlkPhos  68  11-27          RADIOLOGY & ADDITIONAL TESTS:

## 2018-11-28 NOTE — PROGRESS NOTE ADULT - SUBJECTIVE AND OBJECTIVE BOX
CHIEF COMPLAINT/INTERVAL HISTORY:  Pt. seen and evaluated for acute cholecystitis.  Pt. is in no distress.  Tolerating antibiotics.  Reported mild nausea but no vomiting.      REVIEW OF SYSTEMS:  No fever, CP, or SOB.     Vital Signs Last 24 Hrs  T(C): 36.8 (28 Nov 2018 08:33), Max: 37.1 (27 Nov 2018 16:18)  T(F): 98.2 (28 Nov 2018 08:33), Max: 98.8 (27 Nov 2018 16:18)  HR: 72 (28 Nov 2018 08:33) (70 - 75)  BP: 174/70 (28 Nov 2018 08:33) (149/51 - 174/70)  BP(mean): --  RR: 17 (28 Nov 2018 08:33) (17 - 17)  SpO2: 92% (28 Nov 2018 08:33) (91% - 92%)    PHYSICAL EXAM:  GENERAL: NAD  HEENT: EOMI, hearing normal, conjunctiva and sclera clear  Chest: CTA bilaterally, no wheezing  CV: S1S2, RRR,   GI: soft, +BS, NT/ND, laparoscopic incisions clean and intact  Musculoskeletal: + LE lymphedema  Psychiatric: affect nL, mood nL  Skin: warm and dry    LABS:                        10.6   9.53  )-----------( 294      ( 28 Nov 2018 05:51 )             33.7     11-28    140  |  102  |  13  ----------------------------<  88  3.7   |  30  |  0.56    Ca    7.6<L>      28 Nov 2018 05:51    TPro  6.5  /  Alb  2.4<L>  /  TBili  0.6  /  DBili  .20  /  AST  44<H>  /  ALT  61  /  AlkPhos  68  11-27    Assessment and Plan:  -Acute cholecystitis:  Continue Cipro and Flagyl.  Analgesics PRN.  Surgery f/u  -UTI:  ruled out.  Continue Macrobid prophylaxis.    -HTN:  continue Labetalol 50mg PO BID  -Chronic lymphedema:  stable  -VTE ppx:  Lovenox 40mg SQ daily

## 2018-11-28 NOTE — DISCHARGE NOTE ADULT - CARE PROVIDER_API CALL
Irving Vela (MD), Surgery  [facility]  [address] [place] [address]  Phone: [phone]  Fax: [phone]

## 2018-11-28 NOTE — PROGRESS NOTE ADULT - SUBJECTIVE AND OBJECTIVE BOX
pt seen  doing well  tolerating diet  ambulating  ICU Vital Signs Last 24 Hrs  T(C): 36.8 (28 Nov 2018 08:33), Max: 37.1 (27 Nov 2018 16:18)  T(F): 98.2 (28 Nov 2018 08:33), Max: 98.8 (27 Nov 2018 16:18)  HR: 72 (28 Nov 2018 08:33) (70 - 75)  BP: 174/70 (28 Nov 2018 08:33) (149/51 - 174/70)  BP(mean): --  ABP: --  ABP(mean): --  RR: 17 (28 Nov 2018 08:33) (17 - 17)  SpO2: 92% (28 Nov 2018 08:33) (91% - 92%)  gen-NAD  resp-clear  abd-soft NT/ND  incision c/d/i                          10.6   9.53  )-----------( 294      ( 28 Nov 2018 05:51 )             33.7

## 2018-11-28 NOTE — DISCHARGE NOTE ADULT - HOSPITAL COURSE
pt admitted with acute cholecystitis. Underwent lap renzo without incident. Found to have gangrenous gallbladder. Postop did well on IV abx with correction of leukocytosis. D/C to rehab

## 2018-11-29 VITALS — SYSTOLIC BLOOD PRESSURE: 154 MMHG | DIASTOLIC BLOOD PRESSURE: 64 MMHG

## 2018-11-29 LAB
ANION GAP SERPL CALC-SCNC: 6 MMOL/L — SIGNIFICANT CHANGE UP (ref 5–17)
BUN SERPL-MCNC: 10 MG/DL — SIGNIFICANT CHANGE UP (ref 7–23)
CALCIUM SERPL-MCNC: 7.9 MG/DL — LOW (ref 8.5–10.1)
CHLORIDE SERPL-SCNC: 102 MMOL/L — SIGNIFICANT CHANGE UP (ref 96–108)
CO2 SERPL-SCNC: 33 MMOL/L — HIGH (ref 22–31)
CREAT SERPL-MCNC: 0.64 MG/DL — SIGNIFICANT CHANGE UP (ref 0.5–1.3)
GLUCOSE SERPL-MCNC: 90 MG/DL — SIGNIFICANT CHANGE UP (ref 70–99)
HCT VFR BLD CALC: 33.7 % — LOW (ref 34.5–45)
HGB BLD-MCNC: 10.6 G/DL — LOW (ref 11.5–15.5)
MAGNESIUM SERPL-MCNC: 1.9 MG/DL — SIGNIFICANT CHANGE UP (ref 1.6–2.6)
MCHC RBC-ENTMCNC: 25.5 PG — LOW (ref 27–34)
MCHC RBC-ENTMCNC: 31.5 GM/DL — LOW (ref 32–36)
MCV RBC AUTO: 81.2 FL — SIGNIFICANT CHANGE UP (ref 80–100)
NRBC # BLD: 0 /100 WBCS — SIGNIFICANT CHANGE UP (ref 0–0)
PLATELET # BLD AUTO: 282 K/UL — SIGNIFICANT CHANGE UP (ref 150–400)
POTASSIUM SERPL-MCNC: 3.9 MMOL/L — SIGNIFICANT CHANGE UP (ref 3.5–5.3)
POTASSIUM SERPL-SCNC: 3.9 MMOL/L — SIGNIFICANT CHANGE UP (ref 3.5–5.3)
RBC # BLD: 4.15 M/UL — SIGNIFICANT CHANGE UP (ref 3.8–5.2)
RBC # FLD: 14.2 % — SIGNIFICANT CHANGE UP (ref 10.3–14.5)
SODIUM SERPL-SCNC: 141 MMOL/L — SIGNIFICANT CHANGE UP (ref 135–145)
WBC # BLD: 9.97 K/UL — SIGNIFICANT CHANGE UP (ref 3.8–10.5)
WBC # FLD AUTO: 9.97 K/UL — SIGNIFICANT CHANGE UP (ref 3.8–10.5)

## 2018-11-29 PROCEDURE — 36415 COLL VENOUS BLD VENIPUNCTURE: CPT

## 2018-11-29 PROCEDURE — 86900 BLOOD TYPING SEROLOGIC ABO: CPT

## 2018-11-29 PROCEDURE — 97116 GAIT TRAINING THERAPY: CPT

## 2018-11-29 PROCEDURE — 71046 X-RAY EXAM CHEST 2 VIEWS: CPT

## 2018-11-29 PROCEDURE — 99232 SBSQ HOSP IP/OBS MODERATE 35: CPT

## 2018-11-29 PROCEDURE — 85027 COMPLETE CBC AUTOMATED: CPT

## 2018-11-29 PROCEDURE — 97162 PT EVAL MOD COMPLEX 30 MIN: CPT

## 2018-11-29 PROCEDURE — 86850 RBC ANTIBODY SCREEN: CPT

## 2018-11-29 PROCEDURE — 97530 THERAPEUTIC ACTIVITIES: CPT

## 2018-11-29 PROCEDURE — 83690 ASSAY OF LIPASE: CPT

## 2018-11-29 PROCEDURE — 81001 URINALYSIS AUTO W/SCOPE: CPT

## 2018-11-29 PROCEDURE — 80048 BASIC METABOLIC PNL TOTAL CA: CPT

## 2018-11-29 PROCEDURE — 76705 ECHO EXAM OF ABDOMEN: CPT

## 2018-11-29 PROCEDURE — 85610 PROTHROMBIN TIME: CPT

## 2018-11-29 PROCEDURE — 80076 HEPATIC FUNCTION PANEL: CPT

## 2018-11-29 PROCEDURE — 74177 CT ABD & PELVIS W/CONTRAST: CPT

## 2018-11-29 PROCEDURE — 88304 TISSUE EXAM BY PATHOLOGIST: CPT

## 2018-11-29 PROCEDURE — 83880 ASSAY OF NATRIURETIC PEPTIDE: CPT

## 2018-11-29 PROCEDURE — 99285 EMERGENCY DEPT VISIT HI MDM: CPT | Mod: 25

## 2018-11-29 PROCEDURE — 85730 THROMBOPLASTIN TIME PARTIAL: CPT

## 2018-11-29 PROCEDURE — 93005 ELECTROCARDIOGRAM TRACING: CPT

## 2018-11-29 PROCEDURE — 87086 URINE CULTURE/COLONY COUNT: CPT

## 2018-11-29 PROCEDURE — 97112 NEUROMUSCULAR REEDUCATION: CPT

## 2018-11-29 PROCEDURE — 80053 COMPREHEN METABOLIC PANEL: CPT

## 2018-11-29 PROCEDURE — 86901 BLOOD TYPING SEROLOGIC RH(D): CPT

## 2018-11-29 PROCEDURE — 83735 ASSAY OF MAGNESIUM: CPT

## 2018-11-29 PROCEDURE — 84484 ASSAY OF TROPONIN QUANT: CPT

## 2018-11-29 RX ORDER — LACTULOSE 10 G/15ML
30 SOLUTION ORAL ONCE
Qty: 0 | Refills: 0 | Status: COMPLETED | OUTPATIENT
Start: 2018-11-29 | End: 2018-11-29

## 2018-11-29 RX ADMIN — Medication 600 MILLIGRAM(S): at 05:50

## 2018-11-29 RX ADMIN — Medication 50 MILLIGRAM(S): at 05:46

## 2018-11-29 RX ADMIN — Medication 100 MILLIGRAM(S): at 05:46

## 2018-11-29 RX ADMIN — NYSTATIN CREAM 1 APPLICATION(S): 100000 CREAM TOPICAL at 05:47

## 2018-11-29 RX ADMIN — ENOXAPARIN SODIUM 40 MILLIGRAM(S): 100 INJECTION SUBCUTANEOUS at 11:18

## 2018-11-29 RX ADMIN — POLYETHYLENE GLYCOL 3350 17 GRAM(S): 17 POWDER, FOR SOLUTION ORAL at 11:18

## 2018-11-29 RX ADMIN — Medication 100 MILLIGRAM(S): at 11:18

## 2018-11-29 RX ADMIN — Medication 200 MILLIGRAM(S): at 05:46

## 2018-11-29 NOTE — PROGRESS NOTE ADULT - SUBJECTIVE AND OBJECTIVE BOX
Date/Time Patient Seen:  		  Referring MD:   Data Reviewed	       Patient is a 73y old  Female who presents with a chief complaint of cholecystitis (28 Nov 2018 12:59)  in bed  seen and examined  vs and meds reviewed        Subjective/HPI     PAST MEDICAL & SURGICAL HISTORY:  HTN (hypertension)  No significant past surgical history        Medication list         MEDICATIONS  (STANDING):  ciprofloxacin   IVPB 400 milliGRAM(s) IV Intermittent every 12 hours  enoxaparin Injectable 40 milliGRAM(s) SubCutaneous daily  labetalol 50 milliGRAM(s) Oral two times a day  metroNIDAZOLE  IVPB 500 milliGRAM(s) IV Intermittent every 8 hours  nitrofurantoin monohydrate/macrocrystals (MACROBID) 100 milliGRAM(s) Oral two times a day  nystatin Powder 1 Application(s) Topical two times a day  polyethylene glycol 3350 17 Gram(s) Oral daily    MEDICATIONS  (PRN):  acetaminophen   Tablet .. 650 milliGRAM(s) Oral every 6 hours PRN Temp greater or equal to 38C (100.4F), Mild Pain (1 - 3)  diphenhydrAMINE   Injectable 25 milliGRAM(s) IV Push every 6 hours PRN insomnia, and/or itch  ibuprofen  Tablet. 600 milliGRAM(s) Oral every 6 hours PRN Mild Pain (1 - 3)  ondansetron Injectable 4 milliGRAM(s) IV Push every 6 hours PRN Nausea and/or Vomiting  oxyCODONE    5 mG/acetaminophen 325 mG 1 Tablet(s) Oral every 4 hours PRN Moderate Pain (4 - 6)  oxyCODONE    5 mG/acetaminophen 325 mG 2 Tablet(s) Oral every 4 hours PRN Severe Pain (7 - 10)         Vitals log        ICU Vital Signs Last 24 Hrs  T(C): 36.7 (29 Nov 2018 05:48), Max: 37.1 (29 Nov 2018 00:30)  T(F): 98 (29 Nov 2018 05:48), Max: 98.7 (29 Nov 2018 00:30)  HR: 73 (29 Nov 2018 05:48) (72 - 82)  BP: 173/79 (29 Nov 2018 05:48) (144/71 - 174/70)  BP(mean): --  ABP: --  ABP(mean): --  RR: 17 (29 Nov 2018 05:48) (17 - 17)  SpO2: 94% (29 Nov 2018 05:48) (92% - 95%)           Input and Output:  I&O's Detail      Lab Data                        10.6   9.97  )-----------( 282      ( 29 Nov 2018 05:55 )             33.7     11-29    141  |  102  |  10  ----------------------------<  90  3.9   |  33<H>  |  0.64    Ca    7.9<L>      29 Nov 2018 05:55  Mg     1.9     11-29    TPro  6.5  /  Alb  2.4<L>  /  TBili  0.6  /  DBili  .20  /  AST  44<H>  /  ALT  61  /  AlkPhos  68  11-27            Review of Systems	      Objective     Physical Examination    heart s1s2  lung dec BS      Pertinent Lab findings & Imaging      Em:  NO   Adequate UO     I&O's Detail           Discussed with:     Cultures:	        Radiology

## 2018-11-29 NOTE — PROGRESS NOTE ADULT - ASSESSMENT
73F, morbid obesity, HTN, chronic lymphedema, hx dysfunctional uterine bleeding (pt refused gyn w/u and hysterectomy), chronic UTIs on prophylactic macrobid; adm w/RUQ pain, n/v, abd u/s w/dilated CBD, cholelithiasis, CTAP w/cholelith, dilated CBD, and pericholecystic changes c/f acute cholecystitis, WBC 14.48; on IV abxs, planned for surgical intervention     acute cholecystitis, RUQ pain, n/v  -IV abxs cvrg w/cipro, flagyl - pt w/PCN allergy  -f/u cxs  -timing of surgical intervention as per Surgery team  -leukocytosis - in setting of acute infection  s/p lap renzo day 1, tolerating well, tolerating diet  d/c planning    chronic UTI - pt currently w/o acute dysuric sxs, and UA 11/25 is contaminated sample - no need to repeat at this time, as pt asymptomatic for UTI, and already on cipro coverage    HTN - monitoring off home BP meds given risk of hypoTN w/volume depletion, acute infection    chronic lymphedema - stable; no e/o acute infection    dvt prophy    ALL DIAGNOSES,MGMT PLANS REVIEWED IN EXTENSIVE DETAIL AND AT LENGTH WITH PT/DAUGHTER AT BEDSIDE; ALL QUESTIONS ANSWERED COMPREHENSIVELY

## 2018-11-29 NOTE — PROGRESS NOTE ADULT - SUBJECTIVE AND OBJECTIVE BOX
INTERVAL HPI/OVERNIGHT EVENTS:  pt seen and examined  denies n/v, reports abd pain better, +bm  tolerating po  afebrile overnight labs noted    MEDICATIONS  (STANDING):  enoxaparin Injectable 40 milliGRAM(s) SubCutaneous daily  labetalol 50 milliGRAM(s) Oral two times a day  nitrofurantoin monohydrate/macrocrystals (MACROBID) 100 milliGRAM(s) Oral two times a day  nystatin Powder 1 Application(s) Topical two times a day  polyethylene glycol 3350 17 Gram(s) Oral daily    MEDICATIONS  (PRN):  acetaminophen   Tablet .. 650 milliGRAM(s) Oral every 6 hours PRN Temp greater or equal to 38C (100.4F), Mild Pain (1 - 3)  diphenhydrAMINE   Injectable 25 milliGRAM(s) IV Push every 6 hours PRN insomnia, and/or itch  ibuprofen  Tablet. 600 milliGRAM(s) Oral every 6 hours PRN Mild Pain (1 - 3)  ondansetron Injectable 4 milliGRAM(s) IV Push every 6 hours PRN Nausea and/or Vomiting  oxyCODONE    5 mG/acetaminophen 325 mG 1 Tablet(s) Oral every 4 hours PRN Moderate Pain (4 - 6)  oxyCODONE    5 mG/acetaminophen 325 mG 2 Tablet(s) Oral every 4 hours PRN Severe Pain (7 - 10)      Allergies    penicillins (Anaphylaxis)    Intolerances        Review of Systems:    General:  No wt loss, fevers, chills, night sweats, fatigue   Eyes:  Good vision, no reported pain  ENT:  No sore throat, pain, runny nose, dysphagia  CV:  No pain, palpitations, hypo/hypertension  Resp:  No dyspnea, cough, tachypnea, wheezing  GI:  No pain, No nausea, No vomiting, No diarrhea, No constipation, No weight loss, No fever, No pruritis, No rectal bleeding, No melena, No dysphagia  :  No pain, bleeding, incontinence, nocturia  Muscle:  No pain, weakness  Neuro:  No weakness, tingling, memory problems  Psych:  No fatigue, insomnia, mood problems, depression  Endocrine:  No polyuria, polydypsia, cold/heat intolerance  Heme:  No petechiae, ecchymosis, easy bruisability  Skin:  No rash, tattoos, scars, edema      Vital Signs Last 24 Hrs  T(C): 36.8 (29 Nov 2018 07:48), Max: 37.1 (29 Nov 2018 00:30)  T(F): 98.3 (29 Nov 2018 07:48), Max: 98.7 (29 Nov 2018 00:30)  HR: 69 (29 Nov 2018 07:48) (69 - 82)  BP: 154/64 (29 Nov 2018 08:04) (144/71 - 173/79)  BP(mean): --  RR: 17 (29 Nov 2018 07:48) (17 - 17)  SpO2: 94% (29 Nov 2018 07:48) (92% - 95%)    PHYSICAL EXAM:    Constitutional: NAD lying in bed  HEENT: ncat  Neck: No LAD  Respiratory: dec bs  Cardiovascular: S1 and S2, RRR,  Gastrointestinal: obese abd soft mild ttp right sided abd no guarding no rt nd incisions c/d/i  Extremities: chronic lymphedema  Vascular: 2+ peripheral pulses  Neurological: Awake alert responds appropriately  Skin: No rashes      LABS:                        10.6   9.97  )-----------( 282      ( 29 Nov 2018 05:55 )             33.7     11-29    141  |  102  |  10  ----------------------------<  90  3.9   |  33<H>  |  0.64    Ca    7.9<L>      29 Nov 2018 05:55  Mg     1.9     11-29            RADIOLOGY & ADDITIONAL TESTS:

## 2018-11-29 NOTE — PROGRESS NOTE ADULT - PROBLEM SELECTOR PROBLEM 6
Fever, unspecified fever cause

## 2018-11-29 NOTE — PROGRESS NOTE ADULT - PROVIDER SPECIALTY LIST ADULT
Anesthesia
Cardiology
Gastroenterology
Hospitalist
Pulmonology
Pulmonology
Surgery
no

## 2018-11-29 NOTE — PROGRESS NOTE ADULT - SUBJECTIVE AND OBJECTIVE BOX
Patient is a 73y old  Female who presents with a chief complaint of cholecystitis (29 Nov 2018 13:17)        HPI:  pt is a 74 yo female presents with abdominal pain and vomiting x 1 day. PT was in usoh until yesterday when she developed upper abdominal pain and vomiting. This was after eating a fatty meal. Vomiting continued and pain localized to RUQ.    denies any fever/chills.  PAin still present (24 Nov 2018 08:37)      SUBJECTIVE & OBJECTIVE: Pt seen and examined at bedside, no acute complaints     PHYSICAL EXAM:  T(C): 36.8 (11-29-18 @ 07:48), Max: 37.1 (11-29-18 @ 00:30)  HR: 69 (11-29-18 @ 07:48) (69 - 82)  BP: 154/64 (11-29-18 @ 08:04) (144/71 - 173/79)  RR: 17 (11-29-18 @ 07:48) (17 - 17)  SpO2: 94% (11-29-18 @ 07:48) (92% - 95%)  Wt(kg): --   GENERAL: NAD, well-groomed, well-developed  HEAD:  Atraumatic, Normocephalic  EYES: EOMI, PERRLA, conjunctiva and sclera clear  ENMT: Moist mucous membranes  NECK: Supple, No JVD  NERVOUS SYSTEM:  Alert & Oriented X3,   CHEST/LUNG: Clear to auscultation bilaterally; No rales, rhonchi, wheezing, or rubs  HEART: Regular rate and rhythm; No murmurs, rubs, or gallops  ABDOMEN: Soft, Nontender, Nondistended; Bowel sounds present  EXTREMITIES:  2+ Peripheral Pulses, No clubbing, cyanosis, or edema        MEDICATIONS  (STANDING):  enoxaparin Injectable 40 milliGRAM(s) SubCutaneous daily  labetalol 50 milliGRAM(s) Oral two times a day  nitrofurantoin monohydrate/macrocrystals (MACROBID) 100 milliGRAM(s) Oral two times a day  nystatin Powder 1 Application(s) Topical two times a day  polyethylene glycol 3350 17 Gram(s) Oral daily    MEDICATIONS  (PRN):  acetaminophen   Tablet .. 650 milliGRAM(s) Oral every 6 hours PRN Temp greater or equal to 38C (100.4F), Mild Pain (1 - 3)  diphenhydrAMINE   Injectable 25 milliGRAM(s) IV Push every 6 hours PRN insomnia, and/or itch  ibuprofen  Tablet. 600 milliGRAM(s) Oral every 6 hours PRN Mild Pain (1 - 3)  ondansetron Injectable 4 milliGRAM(s) IV Push every 6 hours PRN Nausea and/or Vomiting  oxyCODONE    5 mG/acetaminophen 325 mG 1 Tablet(s) Oral every 4 hours PRN Moderate Pain (4 - 6)  oxyCODONE    5 mG/acetaminophen 325 mG 2 Tablet(s) Oral every 4 hours PRN Severe Pain (7 - 10)      LABS:                        10.6   9.97  )-----------( 282      ( 29 Nov 2018 05:55 )             33.7     11-29    141  |  102  |  10  ----------------------------<  90  3.9   |  33<H>  |  0.64    Ca    7.9<L>      29 Nov 2018 05:55  Mg     1.9     11-29          Magnesium, Serum: 1.9 mg/dL (11-29 @ 05:55)    CAPILLARY BLOOD GLUCOSE          CAPILLARY BLOOD GLUCOSE        CAPILLARY BLOOD GLUCOSE                RECENT CULTURES:      RADIOLOGY & ADDITIONAL TESTS:                        DVT/GI ppx  Discussed with pt @ bedside

## 2018-11-29 NOTE — PROGRESS NOTE ADULT - SUBJECTIVE AND OBJECTIVE BOX
pt seen  no complaints  ICU Vital Signs Last 24 Hrs  T(C): 36.8 (29 Nov 2018 07:48), Max: 37.1 (29 Nov 2018 00:30)  T(F): 98.3 (29 Nov 2018 07:48), Max: 98.7 (29 Nov 2018 00:30)  HR: 69 (29 Nov 2018 07:48) (69 - 82)  BP: 154/64 (29 Nov 2018 08:04) (144/71 - 173/79)  BP(mean): --  ABP: --  ABP(mean): --  RR: 17 (29 Nov 2018 07:48) (17 - 17)  SpO2: 94% (29 Nov 2018 07:48) (92% - 95%)  gen-NAD  resp-clear  abd-soft NT/ND  incision c/d/i                          10.6   9.97  )-----------( 282      ( 29 Nov 2018 05:55 )             33.7

## 2018-11-29 NOTE — PROGRESS NOTE ADULT - ASSESSMENT
73 year old woman with obesity, HTN, chronic lymphedema with acute cholecystitis, s/p lap/cholecystectomy    - s/p lap cholecystectomy without cardiac complications  - Pain control  - Monitor and replete lytes  - BP controlled off meds elevated at times possibly related to pain.  Continue Labetalol.  May increase as necessary  - Supplemental oxygen as needed  - Discharge planning underway.  - To follow with you  - Outpatient follow up with her cardiologist.    Rao Maya ANP  Cardiology 73 year old woman with obesity, HTN, chronic lymphedema with acute cholecystitis, s/p lap/cholecystectomy    - s/p lap cholecystectomy without cardiac complications  - Pain control  - Monitor and replete lytes  - BP controlled off meds elevated at times possibly related to pain.  Continue Labetalol.  May increase as necessary  - Supplemental oxygen as needed  - Incentive [place]  - Discharge planning underway.  - To follow with you  - Outpatient follow up with her cardiologist.    Rao Maya ANP  Cardiology

## 2018-11-29 NOTE — PROGRESS NOTE ADULT - SUBJECTIVE AND OBJECTIVE BOX
[facility] -- Yuval Angela, Marcela, Peter, Hugh Kennedy Savella  Office # [phone]      Follow Up:  HTN, perioperative assessment      Subjective/Observations:   No events overnight resting comfortably in bed without complaints     REVIEW OF SYSTEMS: All other review of systems is negative unless indicated above    PAST MEDICAL & SURGICAL HISTORY:  HTN (hypertension)  No significant past surgical history      MEDICATIONS  (STANDING):  enoxaparin Injectable 40 milliGRAM(s) SubCutaneous daily  labetalol 50 milliGRAM(s) Oral two times a day  nitrofurantoin monohydrate/macrocrystals (MACROBID) 100 milliGRAM(s) Oral two times a day  nystatin Powder 1 Application(s) Topical two times a day  polyethylene glycol 3350 17 Gram(s) Oral daily    MEDICATIONS  (PRN):  acetaminophen   Tablet .. 650 milliGRAM(s) Oral every 6 hours PRN Temp greater or equal to 38C (100.4F), Mild Pain (1 - 3)  diphenhydrAMINE   Injectable 25 milliGRAM(s) IV Push every 6 hours PRN insomnia, and/or itch  ibuprofen  Tablet. 600 milliGRAM(s) Oral every 6 hours PRN Mild Pain (1 - 3)  ondansetron Injectable 4 milliGRAM(s) IV Push every 6 hours PRN Nausea and/or Vomiting  oxyCODONE    5 mG/acetaminophen 325 mG 1 Tablet(s) Oral every 4 hours PRN Moderate Pain (4 - 6)  oxyCODONE    5 mG/acetaminophen 325 mG 2 Tablet(s) Oral every 4 hours PRN Severe Pain (7 - 10)      Allergies    penicillins (Anaphylaxis)    Intolerances        Vital Signs Last 24 Hrs  T(C): 36.8 (29 Nov 2018 07:48), Max: 37.1 (29 Nov 2018 00:30)  T(F): 98.3 (29 Nov 2018 07:48), Max: 98.7 (29 Nov 2018 00:30)  HR: 69 (29 Nov 2018 07:48) (69 - 82)  BP: 154/64 (29 Nov 2018 08:04) (144/71 - 173/79)  BP(mean): --  RR: 17 (29 Nov 2018 07:48) (17 - 17)  SpO2: 94% (29 Nov 2018 07:48) (92% - 95%)    I&O's Summary        PHYSICAL EXAM:  TELE:   Constitutional: NAD, awake and alert, well-developed  HEENT: Moist Mucous Membranes, Anicteric  Pulmonary: Non-labored, breath sounds are clear bilaterally, No wheezing, crackles or rhonchi  Cardiovascular: Regular, S1 and S2 nl, No murmurs, rubs, gallops or clicks  Gastrointestinal: Bowel Sounds present, soft, nontender.   Lymph: No lymphadenopathy. No peripheral edema.  Skin: No visible rashes or ulcers.  Psych:  Mood & affect appropriate    LABS: All Labs Reviewed:                        10.6   9.97  )-----------( 282      ( 29 Nov 2018 05:55 )             33.7                         10.6   9.53  )-----------( 294      ( 28 Nov 2018 05:51 )             33.7                         10.6   13.01 )-----------( 268      ( 27 Nov 2018 08:01 )             33.2     29 Nov 2018 05:55    141    |  102    |  10     ----------------------------<  90     3.9     |  33     |  0.64   28 Nov 2018 05:51    140    |  102    |  13     ----------------------------<  88     3.7     |  30     |  0.56   27 Nov 2018 08:01    140    |  103    |  16     ----------------------------<  111    3.4     |  30     |  0.55     Ca    7.9        29 Nov 2018 05:55  Ca    7.6        28 Nov 2018 05:51  Ca    8.1        27 Nov 2018 08:01  Mg     1.9       29 Nov 2018 05:55    TPro  6.5    /  Alb  2.4    /  TBili  0.6    /  DBili  .20    /  AST  44     /  ALT  61     /  AlkPhos  68     27 Nov 2018 08:01             ECG:  < from: 12 Lead ECG (11.23.18 @ 18:23) >  Ventricular Rate 67 BPM    Atrial Rate 67 BPM    P-R Interval 208 ms    QRS Duration 86 ms    Q-T Interval 396 ms    QTC Calculation(Bezet) 418 ms    P Axis 55 degrees    R Axis -24 degrees    T Axis 50 degrees    Diagnosis Line Normal sinus rhythm  Possible Left atrial enlargement  Left ventricular hypertrophy  Nonspecific ST and T wave abnormality    Confirmed by JOCE KENNEDY ([id number]) on 11/24/2018 9:13:18 AM    < end of copied text >    Echo:    Radiology:  < from: CT Abdomen and Pelvis w/ Oral Cont and w/ IV Cont (11.24.18 @ 00:52) >  EXAM:  CT ABDOMEN AND PELVIS OC IC                            PROCEDURE DATE:  11/24/2018          INTERPRETATION:  CLINICAL INFORMATION: Abdominal pain.    TECHNIQUE: Contrast enhanced CT of the abdomen and pelvis was performed   with coronal and sagittal reformats. 96 cc Omnipaque 350 were   administered intravenously and 4 cc were discarded.     COMPARISON: Abdominal ultrasound 11/23/2018.    FINDINGS:    LOWER CHEST: Within normal limits.    HEPATOBILIARY: Large gallstone in the gallbladderneck with gallbladder   wall thickening and trace pericholecystic fat stranding concerning for   cholecystitis. Dilated common bile duct measuring up to 10 mm. No   radiopaque biliary stones. Fatty liver and hepatomegaly.    PANCREAS: Within normal limits.  SPLEEN: Within normal limits.  ADRENALS: Within normal limits.    KIDNEYS/URETERS/BLADDER: Right posterior interpolar renal cyst measuring   5.2 cm. Subcentimeter hypodense right renal lesion not well   characterized. Symmetric renal enhancement. Punctate nonobstructing right   renal calculi. No hydronephrosis or ureteral calculus. Bladder within   normal limits.  REPRODUCTIVE ORGANS: Enlarged, globular uterus suspicious for adenomyosis.    BOWEL/PERITONEUM: No bowel obstruction or pneumoperitoneum.  Normal   appendix. Colonic diverticulosis without diverticulitis. Portions of the   gastrointestinal tract are collapsed, limiting evaluation.     VESSELS:  Moderate calcific atherosclerosis. No abdominal aortic aneurysm.  LYMPHATICS/RETROPERITONEUM: No lymphadenopathy. No retroperitoneal   hematoma.      SOFT TISSUES: Rectus diastasis.  BONES: Multilevel spinal degenerative changes. Grade 1 anterolisthesis at   L4-L5.    IMPRESSION: Large gallstone in the gallbladder neck with gallbladder wall   thickening and trace pericholecystic fat stranding concerning for   cholecystitis. Dilated common bile duct measuring up to 10 mm. No   radiopaque biliary stones. Fatty liver and hepatomegaly.    Correlate with serum bilirubin levels for biliary obstruction. Consider   MRCP/ERCP as indicated.                    CLAIRE BOJORQUEZ M.D., ATTENDING RADIOLOGIST  This document has been electronically signed. Nov 24 2018  1:02AM                < end of copied text >           Rao Maya [facility]   Cardiology [facility] -- Yuval Angela, Marcela, Peter, Hugh Kennedy Savella  Office # [phone]      Follow Up:  HTN, perioperative assessment      Subjective/Observations:   No events overnight resting comfortably in bed without complaints     REVIEW OF SYSTEMS: All other review of systems is negative unless indicated above    PAST MEDICAL & SURGICAL HISTORY:  HTN (hypertension)  No significant past surgical history      MEDICATIONS  (STANDING):  enoxaparin Injectable 40 milliGRAM(s) SubCutaneous daily  labetalol 50 milliGRAM(s) Oral two times a day  nitrofurantoin monohydrate/macrocrystals (MACROBID) 100 milliGRAM(s) Oral two times a day  nystatin Powder 1 Application(s) Topical two times a day  polyethylene glycol 3350 17 Gram(s) Oral daily    MEDICATIONS  (PRN):  acetaminophen   Tablet .. 650 milliGRAM(s) Oral every 6 hours PRN Temp greater or equal to 38C (100.4F), Mild Pain (1 - 3)  diphenhydrAMINE   Injectable 25 milliGRAM(s) IV Push every 6 hours PRN insomnia, and/or itch  ibuprofen  Tablet. 600 milliGRAM(s) Oral every 6 hours PRN Mild Pain (1 - 3)  ondansetron Injectable 4 milliGRAM(s) IV Push every 6 hours PRN Nausea and/or Vomiting  oxyCODONE    5 mG/acetaminophen 325 mG 1 Tablet(s) Oral every 4 hours PRN Moderate Pain (4 - 6)  oxyCODONE    5 mG/acetaminophen 325 mG 2 Tablet(s) Oral every 4 hours PRN Severe Pain (7 - 10)      Allergies    penicillins (Anaphylaxis)    Intolerances        Vital Signs Last 24 Hrs  T(C): 36.8 (29 Nov 2018 07:48), Max: 37.1 (29 Nov 2018 00:30)  T(F): 98.3 (29 Nov 2018 07:48), Max: 98.7 (29 Nov 2018 00:30)  HR: 69 (29 Nov 2018 07:48) (69 - 82)  BP: 154/64 (29 Nov 2018 08:04) (144/71 - 173/79)  BP(mean): --  RR: 17 (29 Nov 2018 07:48) (17 - 17)  SpO2: 94% (29 Nov 2018 07:48) (92% - 95%)    I&O's Summary        PHYSICAL EXAM:     Constitutional: NAD, awake and alert, well-developed  HEENT: Moist Mucous Membranes, Anicteric  Pulmonary: Decreased breath sounds b/l. No rales, crackles or wheeze appreciated.   Cardiovascular: Regular, S1 and S2 nl, No murmurs, rubs, gallops or clicks  Gastrointestinal: Bowel Sounds present, soft, nontender.   Lymph: No lymphadenopathy. +++lymphedema  Skin: No visible rashes or ulcers.  Psych:  Mood & affect appropriate    LABS: All Labs Reviewed:                        10.6   9.97  )-----------( 282      ( 29 Nov 2018 05:55 )             33.7                         10.6   9.53  )-----------( 294      ( 28 Nov 2018 05:51 )             33.7                         10.6   13.01 )-----------( 268      ( 27 Nov 2018 08:01 )             33.2     29 Nov 2018 05:55    141    |  102    |  10     ----------------------------<  90     3.9     |  33     |  0.64   28 Nov 2018 05:51    140    |  102    |  13     ----------------------------<  88     3.7     |  30     |  0.56   27 Nov 2018 08:01    140    |  103    |  16     ----------------------------<  111    3.4     |  30     |  0.55     Ca    7.9        29 Nov 2018 05:55  Ca    7.6        28 Nov 2018 05:51  Ca    8.1        27 Nov 2018 08:01  Mg     1.9       29 Nov 2018 05:55    TPro  6.5    /  Alb  2.4    /  TBili  0.6    /  DBili  .20    /  AST  44     /  ALT  61     /  AlkPhos  68     27 Nov 2018 08:01             ECG:  < from: 12 Lead ECG (11.23.18 @ 18:23) >  Ventricular Rate 67 BPM    Atrial Rate 67 BPM    P-R Interval 208 ms    QRS Duration 86 ms    Q-T Interval 396 ms    QTC Calculation(Bezet) 418 ms    P Axis 55 degrees    R Axis -24 degrees    T Axis 50 degrees    Diagnosis Line Normal sinus rhythm  Possible Left atrial enlargement  Left ventricular hypertrophy  Nonspecific ST and T wave abnormality    Confirmed by JOCE KENNEDY ([id number]) on 11/24/2018 9:13:18 AM    < end of copied text >    Echo:    Radiology:  < from: CT Abdomen and Pelvis w/ Oral Cont and w/ IV Cont (11.24.18 @ 00:52) >  EXAM:  CT ABDOMEN AND PELVIS OC IC                            PROCEDURE DATE:  11/24/2018          INTERPRETATION:  CLINICAL INFORMATION: Abdominal pain.    TECHNIQUE: Contrast enhanced CT of the abdomen and pelvis was performed   with coronal and sagittal reformats. 96 cc Omnipaque 350 were   administered intravenously and 4 cc were discarded.     COMPARISON: Abdominal ultrasound 11/23/2018.    FINDINGS:    LOWER CHEST: Within normal limits.    HEPATOBILIARY: Large gallstone in the gallbladderneck with gallbladder   wall thickening and trace pericholecystic fat stranding concerning for   cholecystitis. Dilated common bile duct measuring up to 10 mm. No   radiopaque biliary stones. Fatty liver and hepatomegaly.    PANCREAS: Within normal limits.  SPLEEN: Within normal limits.  ADRENALS: Within normal limits.    KIDNEYS/URETERS/BLADDER: Right posterior interpolar renal cyst measuring   5.2 cm. Subcentimeter hypodense right renal lesion not well   characterized. Symmetric renal enhancement. Punctate nonobstructing right   renal calculi. No hydronephrosis or ureteral calculus. Bladder within   normal limits.  REPRODUCTIVE ORGANS: Enlarged, globular uterus suspicious for adenomyosis.    BOWEL/PERITONEUM: No bowel obstruction or pneumoperitoneum.  Normal   appendix. Colonic diverticulosis without diverticulitis. Portions of the   gastrointestinal tract are collapsed, limiting evaluation.     VESSELS:  Moderate calcific atherosclerosis. No abdominal aortic aneurysm.  LYMPHATICS/RETROPERITONEUM: No lymphadenopathy. No retroperitoneal   hematoma.      SOFT TISSUES: Rectus diastasis.  BONES: Multilevel spinal degenerative changes. Grade 1 anterolisthesis at   L4-L5.    IMPRESSION: Large gallstone in the gallbladder neck with gallbladder wall   thickening and trace pericholecystic fat stranding concerning for   cholecystitis. Dilated common bile duct measuring up to 10 mm. No   radiopaque biliary stones. Fatty liver and hepatomegaly.    Correlate with serum bilirubin levels for biliary obstruction. Consider   MRCP/ERCP as indicated.                    CLAIRE BOJORQUEZ M.D., ATTENDING RADIOLOGIST  This document has been electronically signed. Nov 24 2018  1:02AM                < end of copied text >           Rao Maya [facility]   Cardiology

## 2018-11-29 NOTE — PROGRESS NOTE ADULT - PROBLEM SELECTOR PROBLEM 2
Right upper quadrant abdominal pain

## 2018-11-29 NOTE — PROGRESS NOTE ADULT - PROBLEM SELECTOR PROBLEM 1
Morbid obesity
Morbid obesity
Acute cholecystitis
Cholecystitis
Acute cholecystitis

## 2018-11-29 NOTE — PROGRESS NOTE ADULT - PROBLEM SELECTOR PLAN 1
post op  I lowell  remains on ABX therapy  on room air  mobilize as tolerated  discussed pain management with opioids, caution with opioids, bowel regimen  discussed side effects of opioids and addictive nature of this therapy  pt may have FERNIE - will benefit from outpatient eval, discussed in detail, HST vs PSG  will follow and monitor  ambulate  I lowell  DVT p  GI and Surgery follow up noted
post op  fernie  atelectasis  I lowell  mobilize  on opioids for pain regimen, caution with opioids  bowel regimen  planned for DC - MEENU  serial labs  serial PE  wound / site care  surgery follow up  refuses FERNIE tx / sleep hygiene discussed  will follow
NPO IVF   MRCP to evaluate cbd pending   Surgery once medically optimized   continue IV antibiotics
clinically improved  sp OR for lap renzo  on regular diet as per surgery  f/u surgery recs, dc planning in progress  pain control prn  cont gentle bowel regimen  oob as tolerated
sp OR for lap renzo  cont abx  f/u surgery recs  on regular diet as per surgery  pain control  monitor abd exam  consider gentle bowel regimen  oob as tolerated  will follow
sp OR for lap renzo  cont abx  f/u surgery recs  on regular diet as per surgery  pain control  monitor abd exam  oob as tolerated  will follow
sp OR for lap renzo  f/u surgery recs  diet as per surgery  prior lfts wnl  pain control  monitor abd exam  oob as tolerated  further plan of care to be dw attg

## 2018-11-29 NOTE — PROGRESS NOTE ADULT - REASON FOR ADMISSION
cholecystitis

## 2018-11-29 NOTE — PROGRESS NOTE ADULT - ATTENDING COMMENTS
Chart reviewed    Patient seen and examined    Agree with plan as outlined above
Advanced care planning was discussed with patient and family.  Advanced care planning forms were reviewed and discussed.  Risks, benefits and alternatives of gastroenterologic procedures were discussed in detail and all questions were answered.    30 minutes spent.
I saw and examined the patient personally. Spoke with above provider regarding this case. I reviewed the above findings completely.  I agree with the above history, physical, and plan which I have edited where appropriate.
The patient was personally seen and examined, in addition to being examined and evaluated by NP.  All elements of the note were edited where appropriate.

## 2019-09-05 PROBLEM — I10 ESSENTIAL (PRIMARY) HYPERTENSION: Chronic | Status: ACTIVE | Noted: 2018-11-23

## 2019-09-12 ENCOUNTER — APPOINTMENT (OUTPATIENT)
Dept: OBGYN | Facility: CLINIC | Age: 74
End: 2019-09-12
Payer: MEDICARE

## 2019-09-12 VITALS
HEIGHT: 64 IN | SYSTOLIC BLOOD PRESSURE: 150 MMHG | WEIGHT: 255 LBS | DIASTOLIC BLOOD PRESSURE: 78 MMHG | BODY MASS INDEX: 43.54 KG/M2

## 2019-09-12 PROCEDURE — G0101: CPT | Mod: GA

## 2019-09-17 LAB — CYTOLOGY CVX/VAG DOC THIN PREP: NORMAL

## 2019-09-19 ENCOUNTER — APPOINTMENT (OUTPATIENT)
Dept: GYNECOLOGIC ONCOLOGY | Facility: CLINIC | Age: 74
End: 2019-09-19
Payer: MEDICARE

## 2019-09-19 VITALS
BODY MASS INDEX: 43.79 KG/M2 | HEART RATE: 71 BPM | HEIGHT: 64 IN | DIASTOLIC BLOOD PRESSURE: 68 MMHG | WEIGHT: 256.5 LBS | SYSTOLIC BLOOD PRESSURE: 152 MMHG

## 2019-09-19 DIAGNOSIS — N95.0 POSTMENOPAUSAL BLEEDING: ICD-10-CM

## 2019-09-19 PROCEDURE — 99205 OFFICE O/P NEW HI 60 MIN: CPT

## 2019-09-19 RX ORDER — LABETALOL HYDROCHLORIDE 200 MG/1
200 TABLET, FILM COATED ORAL
Refills: 0 | Status: ACTIVE | COMMUNITY

## 2019-09-19 RX ORDER — LOPERAMIDE HYDROCHLORIDE 2 MG/1
CAPSULE, LIQUID FILLED ORAL
Refills: 0 | Status: ACTIVE | COMMUNITY

## 2019-09-19 RX ORDER — CHOLECALCIFEROL (VITAMIN D3) 10(400)/ML
DROPS ORAL
Refills: 0 | Status: ACTIVE | COMMUNITY

## 2019-09-19 RX ORDER — L.ACID/L.CASEI/B.BIF/B.LON/FOS 2B CELL-50
CAPSULE ORAL
Refills: 0 | Status: ACTIVE | COMMUNITY

## 2019-09-23 ENCOUNTER — APPOINTMENT (OUTPATIENT)
Dept: GYNECOLOGIC ONCOLOGY | Facility: CLINIC | Age: 74
End: 2019-09-23

## 2019-09-24 ENCOUNTER — APPOINTMENT (OUTPATIENT)
Dept: OBGYN | Facility: CLINIC | Age: 74
End: 2019-09-24
Payer: MEDICARE

## 2019-09-24 PROCEDURE — 99214 OFFICE O/P EST MOD 30 MIN: CPT

## 2019-09-25 ENCOUNTER — ASOB RESULT (OUTPATIENT)
Age: 74
End: 2019-09-25

## 2019-09-25 ENCOUNTER — APPOINTMENT (OUTPATIENT)
Dept: OBGYN | Facility: CLINIC | Age: 74
End: 2019-09-25
Payer: MEDICARE

## 2019-09-25 PROCEDURE — 76830 TRANSVAGINAL US NON-OB: CPT

## 2019-09-26 ENCOUNTER — APPOINTMENT (OUTPATIENT)
Dept: OBGYN | Facility: CLINIC | Age: 74
End: 2019-09-26
Payer: MEDICARE

## 2019-09-26 DIAGNOSIS — N85.02 ENDOMETRIAL INTRAEPITHELIAL NEOPLASIA [EIN]: ICD-10-CM

## 2019-09-26 DIAGNOSIS — N84.0 POLYP OF CORPUS UTERI: ICD-10-CM

## 2019-09-26 PROCEDURE — 58558Z: CUSTOM

## 2019-10-03 ENCOUNTER — MESSAGE (OUTPATIENT)
Age: 74
End: 2019-10-03

## 2019-10-03 LAB — CORE LAB BIOPSY: NORMAL

## 2019-10-16 ENCOUNTER — MESSAGE (OUTPATIENT)
Age: 74
End: 2019-10-16

## 2019-10-24 ENCOUNTER — APPOINTMENT (OUTPATIENT)
Dept: GYNECOLOGIC ONCOLOGY | Facility: CLINIC | Age: 74
End: 2019-10-24

## 2019-10-30 ENCOUNTER — APPOINTMENT (OUTPATIENT)
Dept: GYNECOLOGIC ONCOLOGY | Facility: CLINIC | Age: 74
End: 2019-10-30
Payer: MEDICARE

## 2019-10-30 VITALS
SYSTOLIC BLOOD PRESSURE: 190 MMHG | WEIGHT: 252.25 LBS | HEIGHT: 64 IN | BODY MASS INDEX: 43.07 KG/M2 | DIASTOLIC BLOOD PRESSURE: 73 MMHG | HEART RATE: 74 BPM

## 2019-10-30 VITALS
BODY MASS INDEX: 43.07 KG/M2 | HEART RATE: 74 BPM | WEIGHT: 252.25 LBS | HEIGHT: 64 IN | SYSTOLIC BLOOD PRESSURE: 190 MMHG | DIASTOLIC BLOOD PRESSURE: 73 MMHG

## 2019-10-30 DIAGNOSIS — C54.1 MALIGNANT NEOPLASM OF ENDOMETRIUM: ICD-10-CM

## 2019-10-30 PROCEDURE — 99215 OFFICE O/P EST HI 40 MIN: CPT

## 2019-11-04 ENCOUNTER — APPOINTMENT (OUTPATIENT)
Dept: UROGYNECOLOGY | Facility: CLINIC | Age: 74
End: 2019-11-04
Payer: MEDICARE

## 2019-11-04 VITALS
SYSTOLIC BLOOD PRESSURE: 158 MMHG | DIASTOLIC BLOOD PRESSURE: 80 MMHG | HEIGHT: 64 IN | WEIGHT: 254 LBS | BODY MASS INDEX: 43.36 KG/M2 | HEART RATE: 67 BPM

## 2019-11-04 DIAGNOSIS — Z81.8 FAMILY HISTORY OF OTHER MENTAL AND BEHAVIORAL DISORDERS: ICD-10-CM

## 2019-11-04 DIAGNOSIS — Z80.3 FAMILY HISTORY OF MALIGNANT NEOPLASM OF BREAST: ICD-10-CM

## 2019-11-04 DIAGNOSIS — N39.46 MIXED INCONTINENCE: ICD-10-CM

## 2019-11-04 DIAGNOSIS — Z80.1 FAMILY HISTORY OF MALIGNANT NEOPLASM OF TRACHEA, BRONCHUS AND LUNG: ICD-10-CM

## 2019-11-04 DIAGNOSIS — N39.3 STRESS INCONTINENCE (FEMALE) (MALE): ICD-10-CM

## 2019-11-04 DIAGNOSIS — N81.6 RECTOCELE: ICD-10-CM

## 2019-11-04 LAB
BILIRUB UR QL STRIP: NORMAL
CLARITY UR: CLEAR
COLLECTION METHOD: NORMAL
GLUCOSE UR-MCNC: NORMAL
HCG UR QL: 0.2 EU/DL
HGB UR QL STRIP.AUTO: NORMAL
KETONES UR-MCNC: NORMAL
LEUKOCYTE ESTERASE UR QL STRIP: NORMAL
NITRITE UR QL STRIP: NORMAL
PH UR STRIP: 6
PROT UR STRIP-MCNC: NORMAL
SP GR UR STRIP: 1.02

## 2019-11-04 PROCEDURE — 81003 URINALYSIS AUTO W/O SCOPE: CPT | Mod: NC,QW

## 2019-11-04 PROCEDURE — 99204 OFFICE O/P NEW MOD 45 MIN: CPT | Mod: 25

## 2019-11-04 PROCEDURE — 51701 INSERT BLADDER CATHETER: CPT

## 2019-11-04 PROCEDURE — 99213 OFFICE O/P EST LOW 20 MIN: CPT | Mod: 25

## 2019-11-04 NOTE — HISTORY OF PRESENT ILLNESS
[Rectal Prolapse] : none [Urge Incontinence Of Urine] : none [Unable To Restrain Bowel Movement] : daily [Urinary Frequency] : none [] : years ago [Urinary Tract Infection] : none [Urinary Frequency More Than Twice At Night (Nocturia)] : none [Stress Incontinence] : daily [de-identified] :  sleeps on electric chair [de-identified] : with cough, sneeze, sometimes walking [de-identified] : few [de-identified] : many [de-identified] : wears at least 5-6 pads/day [de-identified] : every 1-2-3 hours [de-identified] : not sexually active [FreeTextEntry1] : 75yo with endometrial cancer and urinary incontinence \par \par \par PMH: Endometrial cancer, chronic lymphedema, HTN\par PSH: D+C x2\par : \par ROS as per questionnaire.\par pt was poor historian\par

## 2019-11-04 NOTE — PHYSICAL EXAM
[No Acute Distress] : in no acute distress [Well developed] : well developed [Well Nourished] : ~L well nourished [Oriented x3] : oriented to person, place, and time [Normal Memory] : ~T memory was ~L unimpaired [Normal Mood/Affect] : mood and affect are normal [Normal Mental Status] : the patient was oriented to person, place and time [Appropriate] : appropriate [Bulbocavernous] : bulbocavernous was present [Anal Reflex] : the anal reflex was present [Warm and Dry] : was warm and dry to touch [Normal Gait] : gait was normal [Labia Majora] : were normal [Labia Minora] : were normal [Normal Appearance] : general appearance was normal [Normal] : no abnormalities [Normal rectal exam] : was normal [Obese] : obese [No Edema] : ~T edema was not present [Rectocele] : a rectocele [Ap ____] : Ap [unfilled] [Bp ____] : Bp [unfilled] [Post Void Residual ____ml] : post void residual via catheterization was [unfilled] ml [Anxiety] : patient is not anxious [Tenderness] : ~T no ~M abdominal tenderness observed [Distended] : not distended [H/Smegaly] : no hepatosplenomegaly [Inguinal LAD] : no adenopathy was noted in the inguinal lymph nodes [de-identified] : unable to assess due to abdominal girth

## 2019-11-04 NOTE — PROCEDURE
[FreeTextEntry1] : \par Sterile straight catheterization was performed to rule out infection and to measure a postvoid residual volume which was 20 cc

## 2019-11-04 NOTE — DISCUSSION/SUMMARY
[FreeTextEntry1] : I reviewed the above findings with the patient. We discussed the stress incontinence and urge incontinence and possible overactive bladder. Surgical and nonsurgical treatment options for the stress urinary incontinence were discussed and included doing nothing, behavioral modification, Kegel's exercises with and without biofeedback, medications, a pessary or intravaginal devices, periurethral bulking, and surgical correction with a suburethral sling v Marcial v autologous sling. We discussed that surgery for stress incontinence is not intended to treat her overactive bladder complaints including urgency frequency and urge incontinence. We discussed management options for OAB.   We discussed first line therapies, including Kegel's exercises and behavioral modification. We discussed medications. We also  discussed second and third line therapies, including the InterStim procedure, Botox, and PTNS. We discussed the rectocele as well surgical and nonsurgical treatment options. She is interested in possible surgical correction for her stress incontinence at the time of her hysterectomy and I have asked her to return for urodynamic testing to further evaluate her urinary complaints. We will further discuss treatment options after her evaluation is completed. IUGA patient information on stress incontinence and overactive bladder was given to her.

## 2019-11-06 ENCOUNTER — APPOINTMENT (OUTPATIENT)
Dept: UROGYNECOLOGY | Facility: CLINIC | Age: 74
End: 2019-11-06

## 2019-11-07 LAB
APPEARANCE: CLEAR
BACTERIA UR CULT: ABNORMAL
BACTERIA: ABNORMAL
BILIRUBIN URINE: NEGATIVE
BLOOD URINE: NEGATIVE
COLOR: YELLOW
GLUCOSE QUALITATIVE U: NEGATIVE
HYALINE CASTS: 2 /LPF
KETONES URINE: NEGATIVE
LEUKOCYTE ESTERASE URINE: ABNORMAL
MICROSCOPIC-UA: NORMAL
NITRITE URINE: POSITIVE
PH URINE: 6
PROTEIN URINE: NORMAL
RED BLOOD CELLS URINE: 1 /HPF
SPECIFIC GRAVITY URINE: 1.02
SQUAMOUS EPITHELIAL CELLS: 4 /HPF
UROBILINOGEN URINE: NORMAL
WHITE BLOOD CELLS URINE: 23 /HPF

## 2019-11-07 RX ORDER — SULFAMETHOXAZOLE AND TRIMETHOPRIM 800; 160 MG/1; MG/1
800-160 TABLET ORAL
Qty: 6 | Refills: 0 | Status: ACTIVE | COMMUNITY
Start: 2019-11-07 | End: 1900-01-01

## 2019-11-11 ENCOUNTER — APPOINTMENT (OUTPATIENT)
Dept: UROGYNECOLOGY | Facility: CLINIC | Age: 74
End: 2019-11-11

## 2019-12-02 ENCOUNTER — OUTPATIENT (OUTPATIENT)
Dept: OUTPATIENT SERVICES | Facility: HOSPITAL | Age: 74
LOS: 1 days | End: 2019-12-02

## 2019-12-02 VITALS
RESPIRATION RATE: 16 BRPM | SYSTOLIC BLOOD PRESSURE: 136 MMHG | HEART RATE: 62 BPM | HEIGHT: 64 IN | TEMPERATURE: 97 F | WEIGHT: 238.1 LBS | OXYGEN SATURATION: 97 % | DIASTOLIC BLOOD PRESSURE: 78 MMHG

## 2019-12-02 DIAGNOSIS — N85.02 ENDOMETRIAL INTRAEPITHELIAL NEOPLASIA [EIN]: ICD-10-CM

## 2019-12-02 DIAGNOSIS — Z90.49 ACQUIRED ABSENCE OF OTHER SPECIFIED PARTS OF DIGESTIVE TRACT: Chronic | ICD-10-CM

## 2019-12-02 DIAGNOSIS — Z86.79 PERSONAL HISTORY OF OTHER DISEASES OF THE CIRCULATORY SYSTEM: ICD-10-CM

## 2019-12-02 DIAGNOSIS — N85.00 ENDOMETRIAL HYPERPLASIA, UNSPECIFIED: ICD-10-CM

## 2019-12-02 DIAGNOSIS — Z98.49 CATARACT EXTRACTION STATUS, UNSPECIFIED EYE: Chronic | ICD-10-CM

## 2019-12-02 LAB
BLD GP AB SCN SERPL QL: NEGATIVE — SIGNIFICANT CHANGE UP
RH IG SCN BLD-IMP: POSITIVE — SIGNIFICANT CHANGE UP

## 2019-12-02 RX ORDER — LABETALOL HCL 100 MG
0 TABLET ORAL
Qty: 0 | Refills: 0 | DISCHARGE

## 2019-12-02 NOTE — H&P PST ADULT - SKIN/BREAST COMMENTS
Hx of skin cancer nose on biopsy, Pt to schedule Mohs after this surgery Hx of skin cancer nose on biopsy, Pt to schedule Mohs after this surgery.  Pt reports open wound to left ankle x10 days.  Followed by Dermatologist.  Pt applying Bactroban ointment with DSD bid.  Per pt, wound resolving

## 2019-12-02 NOTE — H&P PST ADULT - ANESTHESIA, PREVIOUS REACTION, PROFILE
family reports weakness, memory loss after anesthesia nausea/vomiting/family reports weakness, memory loss after anesthesia

## 2019-12-02 NOTE — H&P PST ADULT - NSICDXPASTMEDICALHX_GEN_ALL_CORE_FT
PAST MEDICAL HISTORY:  HTN (hypertension) PAST MEDICAL HISTORY:  Endometrial hyperplasia     HTN (hypertension)     Lymphedema of leg bilateral LE    Morbid obesity     Skin cancer nose.  Pt recently had biopsy and will n\require Moh's surgery in future

## 2019-12-02 NOTE — H&P PST ADULT - NSANTHOSAYNRD_GEN_A_CORE
No. FERNIE screening performed.  STOP BANG Legend: 0-2 = LOW Risk; 3-4 = INTERMEDIATE Risk; 5-8 = HIGH Risk

## 2019-12-02 NOTE — H&P PST ADULT - NSICDXPASTSURGICALHX_GEN_ALL_CORE_FT
PAST SURGICAL HISTORY:  No significant past surgical history PAST SURGICAL HISTORY:  H/O cataract extraction 2014 (approx)    History of cholecystectomy 2018

## 2019-12-02 NOTE — H&P PST ADULT - ASSESSMENT
75 y/o female with postmenopausal bleeding since menopause many years ago.  Recent ultrasound showed changes in endometrium.  Scheduled for Robotic   Assisted Total laparoscopic Hysterectomy, BSO, [place] Lymph node mapping, possible biopsies 12/5/19. 75 y/o female with postmenopausal bleeding since menopause many years ago.  Recent ultrasound showed changes in endometrium.  Scheduled for Robotic Assisted Total laparoscopic Hysterectomy, BSO, [place] Lymph node mapping, possible biopsies 12/5/19.

## 2019-12-02 NOTE — H&P PST ADULT - ATTENDING COMMENTS
Patient seen in presurgical holding area with her daughter present for the informed consent discussion.  R/B/A were reviewed & understood; the written consent is signed & witnessed in the chart.    Josiane Saleh MD

## 2019-12-02 NOTE — H&P PST ADULT - NSICDXPROBLEM_GEN_ALL_CORE_FT
PROBLEM DIAGNOSES  Problem: Endometrial hyperplasia  Assessment and Plan:  Total laparoscopic Hysterectomy, BSO, [place] Lymph node mapping, possible biopsies 12/5/19.   CBC CMP HgbA1C  UA done by PMD 11/13/19,   T&S done at [facility]  Preop instructions and antibacterial soap given and explained (verbal and written), with teach back.   FERNIE precautions, OR booking informed.  Pt has appt with PMD for pre-op eval today.     Problem: History of hypertension  Assessment and Plan: Pt reports she was seen by Cardiolgoist for pre-op eval and had echo, requested on chart.

## 2019-12-02 NOTE — H&P PST ADULT - HISTORY OF PRESENT ILLNESS
75 y/o female with postmenopausal bleeding since menopause many years ago.  Recent ultrasound showed changes in endometrium.  Scheduled for Robotic   Assisted Total laparoscopic Hysterectomy, BSO, [place] Lymph node mapping, possible biopsies 12/5/19. 73 y/o female with postmenopausal bleeding since menopause many years ago.  Recent ultrasound showed changes in endometrium.  Scheduled for Robotic Assisted Total laparoscopic Hysterectomy, BSO, [place] Lymph node mapping, possible biopsies 12/5/19.

## 2019-12-04 ENCOUNTER — TRANSCRIPTION ENCOUNTER (OUTPATIENT)
Age: 74
End: 2019-12-04

## 2019-12-04 NOTE — ASU PATIENT PROFILE, ADULT - PMH
Endometrial hyperplasia    HTN (hypertension)    Lymphedema of leg  bilateral LE  Morbid obesity    Skin cancer  nose.  Pt recently had biopsy and will n\require Moh's surgery in future

## 2019-12-04 NOTE — ASU PATIENT PROFILE, ADULT - CENTRAL VENOUS CATHETER
Dr. Bryson, Is this ok? Thank you   
Letter printed and faxed to [phone]  
Ok to give  
Patient called in regards to not feeling well and needs another work excuse to be extended from 5/1 to 5/3, can be reached at [phone]  
Will forward to PSRs to do excuse as noted by Dr. Bryson  
no

## 2019-12-05 ENCOUNTER — RESULT REVIEW (OUTPATIENT)
Age: 74
End: 2019-12-05

## 2019-12-05 ENCOUNTER — INPATIENT (INPATIENT)
Facility: HOSPITAL | Age: 74
LOS: 4 days | Discharge: NURSING, REHAB OR LONG-TERM CARE | End: 2019-12-10
Attending: OBSTETRICS & GYNECOLOGY | Admitting: OBSTETRICS & GYNECOLOGY
Payer: MEDICARE

## 2019-12-05 ENCOUNTER — APPOINTMENT (OUTPATIENT)
Dept: GYNECOLOGIC ONCOLOGY | Facility: HOSPITAL | Age: 74
End: 2019-12-05

## 2019-12-05 VITALS
HEART RATE: 60 BPM | OXYGEN SATURATION: 97 % | WEIGHT: 238.1 LBS | HEIGHT: 64 IN | SYSTOLIC BLOOD PRESSURE: 146 MMHG | RESPIRATION RATE: 14 BRPM | DIASTOLIC BLOOD PRESSURE: 50 MMHG | TEMPERATURE: 98 F

## 2019-12-05 DIAGNOSIS — N85.02 ENDOMETRIAL INTRAEPITHELIAL NEOPLASIA [EIN]: ICD-10-CM

## 2019-12-05 DIAGNOSIS — Z98.49 CATARACT EXTRACTION STATUS, UNSPECIFIED EYE: Chronic | ICD-10-CM

## 2019-12-05 DIAGNOSIS — Z90.49 ACQUIRED ABSENCE OF OTHER SPECIFIED PARTS OF DIGESTIVE TRACT: Chronic | ICD-10-CM

## 2019-12-05 LAB
GLUCOSE BLDC GLUCOMTR-MCNC: 78 MG/DL — SIGNIFICANT CHANGE UP (ref 70–99)
RH IG SCN BLD-IMP: POSITIVE — SIGNIFICANT CHANGE UP

## 2019-12-05 PROCEDURE — 88309 TISSUE EXAM BY PATHOLOGIST: CPT | Mod: 26

## 2019-12-05 PROCEDURE — 88341 IMHCHEM/IMCYTCHM EA ADD ANTB: CPT | Mod: 26

## 2019-12-05 PROCEDURE — 88112 CYTOPATH CELL ENHANCE TECH: CPT | Mod: 26

## 2019-12-05 PROCEDURE — 88307 TISSUE EXAM BY PATHOLOGIST: CPT | Mod: 26

## 2019-12-05 PROCEDURE — 88342 IMHCHEM/IMCYTCHM 1ST ANTB: CPT | Mod: 26

## 2019-12-05 PROCEDURE — 88331 PATH CONSLTJ SURG 1 BLK 1SPC: CPT | Mod: 26

## 2019-12-05 RX ORDER — OXYCODONE HYDROCHLORIDE 5 MG/1
5 TABLET ORAL EVERY 4 HOURS
Refills: 0 | Status: DISCONTINUED | OUTPATIENT
Start: 2019-12-05 | End: 2019-12-10

## 2019-12-05 RX ORDER — KETOROLAC TROMETHAMINE 30 MG/ML
30 SYRINGE (ML) INJECTION EVERY 8 HOURS
Refills: 0 | Status: COMPLETED | OUTPATIENT
Start: 2019-12-05 | End: 2019-12-10

## 2019-12-05 RX ORDER — SODIUM CHLORIDE 9 MG/ML
1000 INJECTION, SOLUTION INTRAVENOUS
Refills: 0 | Status: DISCONTINUED | OUTPATIENT
Start: 2019-12-05 | End: 2019-12-05

## 2019-12-05 RX ORDER — LABETALOL HCL 100 MG
1 TABLET ORAL
Qty: 0 | Refills: 0 | DISCHARGE

## 2019-12-05 RX ORDER — HEPARIN SODIUM 5000 [USP'U]/ML
5000 INJECTION INTRAVENOUS; SUBCUTANEOUS EVERY 8 HOURS
Refills: 0 | Status: DISCONTINUED | OUTPATIENT
Start: 2019-12-05 | End: 2019-12-06

## 2019-12-05 RX ORDER — SODIUM CHLORIDE 9 MG/ML
1000 INJECTION, SOLUTION INTRAVENOUS
Refills: 0 | Status: DISCONTINUED | OUTPATIENT
Start: 2019-12-05 | End: 2019-12-06

## 2019-12-05 RX ORDER — HYDROMORPHONE HYDROCHLORIDE 2 MG/ML
0.5 INJECTION INTRAMUSCULAR; INTRAVENOUS; SUBCUTANEOUS
Refills: 0 | Status: DISCONTINUED | OUTPATIENT
Start: 2019-12-05 | End: 2019-12-06

## 2019-12-05 RX ORDER — OXYCODONE HYDROCHLORIDE 5 MG/1
10 TABLET ORAL EVERY 6 HOURS
Refills: 0 | Status: DISCONTINUED | OUTPATIENT
Start: 2019-12-05 | End: 2019-12-10

## 2019-12-05 RX ORDER — ACETAMINOPHEN 500 MG
650 TABLET ORAL EVERY 6 HOURS
Refills: 0 | Status: DISCONTINUED | OUTPATIENT
Start: 2019-12-05 | End: 2019-12-10

## 2019-12-05 RX ORDER — MUPIROCIN 20 MG/G
0 OINTMENT TOPICAL
Qty: 0 | Refills: 0 | DISCHARGE

## 2019-12-05 RX ORDER — ONDANSETRON 8 MG/1
4 TABLET, FILM COATED ORAL ONCE
Refills: 0 | Status: DISCONTINUED | OUTPATIENT
Start: 2019-12-05 | End: 2019-12-06

## 2019-12-05 RX ADMIN — HYDROMORPHONE HYDROCHLORIDE 0.5 MILLIGRAM(S): 2 INJECTION INTRAMUSCULAR; INTRAVENOUS; SUBCUTANEOUS at 22:40

## 2019-12-05 RX ADMIN — HYDROMORPHONE HYDROCHLORIDE 0.5 MILLIGRAM(S): 2 INJECTION INTRAMUSCULAR; INTRAVENOUS; SUBCUTANEOUS at 22:55

## 2019-12-05 NOTE — ASU PREOP CHECKLIST - SPO2 (%)
General


Time Seen by Provider: 03/20/19 18:15


Narrative: 





CLINICAL IMPRESSION:  Chronic pelvic pain


_________________


ASSESSMENT/PLAN:


18-year-old female presents to the emergency department with complaints of 

pelvic pain and vaginal bleeding since IUD placement in January of this year by 

her gynecologist in [place].  Patient reports she was told by [facility] to come to the emergency department today because she had recurrent 

vaginal bleeding following treatment for a pelvic infection.  She is not 

sexually active at this time but was treated for PID by the [facility] with doxycycline and Flagyl a week ago and reports her bleeding improved 

during that time.  However, she had no abnormal vaginal discharge, clear STD 

testing, and a normal wet prep and never reported nausea, vomiting, severe 

abdominal pain fever or chills.  She continues to deny these symptoms.  She is 

bothered by persistent pelvic discomfort and has an ultrasound that is 

reassuring tonight with normal placement of IUD, no evidence of ovarian cyst, 

torsion or salpingitis.  I did discuss an offer repeat pelvic exam and wet prep 

testing but she has declined this.  She reports no symptoms consistent with 

yeast vaginitis.  She would like to contact her primary gynecologist in 

[place] tomorrow for additional recommendations.  She is well appearing

, no anemia and stable labs and she is not pregnant.  Patient is comfortable 

discharge.  Warning signs return to ED outlined discharge. 


_________________


DIFFERENTIAL DX:


 Abdominal pain includes but not limited to urinary tract infection, 

pyelonephritis,  infection, ectopic pregnancy, salpingitis, TOA, ovarian 

torsion, ovarian cyst, endometriosis, uterine fibroids, acute appendicitis, 

acute diverticulitis, small-bowel obstruction, constipation


_________________


ED PROCEDURES:


 See lab and/or imaging results below





ED COURSE:


8:30 p.m.:  Patient reassessed.  Ultrasound read by Dr. Ash, IUD in 

appropriate placement, no other significant abnormality identified.  Labs 

reassuring, no anemia.  Urine studies normal.  Non pregnant.  I did give the 

patient the option of repeat pelvic exam and swabs today but she has declined.  

She recently completed treatment with metronidazole and doxycycline.  She would 

prefer to contact her primary gynecologist in [place] tomorrow for 

follow-up.  Warning signs return to ED sooner were discussed.


_________________


CHIEF COMPLAINT:  Vaginal bleeding and pelvic pain times 3 months,


_________________


HPI:


 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 

emergency department with complaints of intermittent pelvic pain and vaginal 

bleeding for the last 3 months since Mirena IUD placement.  Patient reports the 

IUD was placed by her gynecologist in [place] as an alternative to 

oral contraceptives.  She states since placement she has had vaginal bleeding.  

Several weeks ago, she was seen at the [facility] for pelvic pain, 

reportedly had normal STD testing and wet prep however was still treated with 

doxycycline and metronidazole.  She states bleeding subsided after taking 

antibiotics but began again several days ago.  She continues to have lower 

pelvic pain.  No associated fever, chills, abnormal vaginal discharge or UTI 

symptoms.  She is not currently sexually active and denies intercourse in the 

last year.  She has not had a pelvic ultrasound.  She was unable to reach her 

primary gynecologist by phone today.  She was told by the [facility] 

to come to the ER today.  She also reports a distant history of anemia but does 

not take iron supplementation and has not had labs checked recently


_________________


PAST MEDICAL HISTORY: 


 hx of anemia See triage summary and nurse notes for addition applicable 

history 





Pertinent Past Surgical History:  None reported





Family History:  Noncontributory





Social History:  Otherwise healthy, student at [employer]


_________________


REVIEW OF SYSTEMS:


A full 10 point review of systems was negative except for those mentioned in 

HPI. 


_________________


PHYSICAL EXAM:


General Appearance:  Alert, oriented, appropriate, cooperative, very pleasant 

NAD, well hydrated, non-toxic appearing, VSS, no hypoxia.


Neck: Supple, nontender, no lymphadenopathy, no midline pain, FROM, no 

meningismus.


Respiratory:  There are no retractions, lungs are clear to auscultation.


Cardiac:  Regular rate and rhythm, no murmurs or gallops.


Gastrointestinal: Abdomen is soft, nontender, bowel sounds normal, no masses/

hernia, no rigidity, guarding or focal peritoneal findings.  Patient declined 

 exam.


Skin: Warm, dry, no rashes, no nodules on palpation.  Slightly pale


_________________


MEDICAL DECISION MAKING:


Patient was seen independently. Secondary supervising physician at time of 

evaluation was:  Dr. Sue.


Diagnosis: Chronic pelvic pain, dysfunctional uterine bleeding . New, requires 

workup


Summary: See Assessment and Plan for summary of ED visit 


Clinical lab tests:  ordered / reviewed.


Independent visualization of images, tracing, or specimens:  Yes.








Patient Progress:  Stable for discharge. 





- Diagnostics


Imaging Results: 


 Imaging Impressions





Pelvic/Renal Ultrasound  03/20/19 18:41


Impression: IUD is in good position endometrial cavity. Question some mild 

surrounding hyperemia around the endometrium which could represent mild 

inflammatory change. Otherwise normal pelvic ultrasound. 


 


Results called and discussed with Prashanth Silverman at 3/20/2019 19:45.














- History


Smoking Status: Never smoked





- Objective


Vital Signs: 


 Initial Vital Signs











Temperature (C)  36.6 C   03/20/19 17:51


 


Heart Rate  99   03/20/19 17:51


 


Respiratory Rate  16   03/20/19 17:51


 


Blood Pressure  137/71 H  03/20/19 17:51


 


O2 Sat (%)  96   03/20/19 17:51








 











O2 Delivery Mode               Room Air














Allergies/Adverse Reactions: 


 





amoxicillin Allergy (Verified 03/20/19 17:55)


 


Penicillins Allergy (Verified 03/20/19 17:55)


 








Home Medications: 














 Medication  Instructions  Recorded


 


Doxycycline Hyclate  03/20/19


 


Gabapentin  03/20/19


 


Lexapro  03/20/19


 


Metronidazole  03/20/19


 


Prazosin HCl  03/20/19


 


Topiramate  03/20/19











Laboratory Results: 


 Laboratory Results





 03/20/19 18:25 





 03/20/19 18:25 





 











  03/20/19 03/20/19 03/20/19





  18:25 18:25 18:25


 


WBC      7.52 10^3/uL 10^3/uL





     (3.80-9.50) 


 


RBC      5.30 10^6/uL 10^6/uL





     (4.18-5.33) 


 


Hgb      13.7 g/dL g/dL





     (12.6-16.3) 


 


Hct      42.2 % %





     (38.0-47.0) 


 


MCV      79.6 fL L fL





     (81.5-99.8) 


 


MCH      25.8 pg L pg





     (27.9-34.1) 


 


MCHC      32.5 g/dL g/dL





     (32.4-36.7) 


 


RDW      14.2 % %





     (11.5-15.2) 


 


Plt Count      279 10^3/uL 10^3/uL





     (150-400) 


 


MPV      10.4 fL fL





     (8.7-11.7) 


 


Neut % (Auto)      54.8 % %





     (39.3-74.2) 


 


Lymph % (Auto)      33.2 % %





     (15.0-45.0) 


 


Mono % (Auto)      9.2 % %





     (4.5-13.0) 


 


Eos % (Auto)      1.9 % %





     (0.6-7.6) 


 


Baso % (Auto)      0.8 % %





     (0.3-1.7) 


 


Nucleat RBC Rel Count      0.0 % %





     (0.0-0.2) 


 


Absolute Neuts (auto)      4.12 10^3/uL 10^3/uL





     (1.70-6.50) 


 


Absolute Lymphs (auto)      2.50 10^3/uL 10^3/uL





     (1.00-3.00) 


 


Absolute Monos (auto)      0.69 10^3/uL 10^3/uL





     (0.30-0.80) 


 


Absolute Eos (auto)      0.14 10^3/uL 10^3/uL





     (0.03-0.40) 


 


Absolute Basos (auto)      0.06 10^3/uL 10^3/uL





     (0.02-0.10) 


 


Absolute Nucleated RBC      0.00 10^3/uL 10^3/uL





     (0-0.01) 


 


Immature Gran %      0.1 % %





     (0.0-1.1) 


 


Immature Gran #      0.01 10^3/uL 10^3/uL





     (0.00-0.10) 


 


Sodium    141 mEq/L mEq/L  





    (135-145)  


 


Potassium    4.2 mEq/L mEq/L  





    (3.5-5.2)  


 


Chloride    110 mEq/L mEq/L  





    ()  


 


Carbon Dioxide    20 mEq/l L mEq/l  





    (22-31)  


 


Anion Gap    11 mEq/L mEq/L  





    (6-14)  


 


BUN    14 mg/dL mg/dL  





    (7-23)  


 


Creatinine    0.9 mg/dL mg/dL  





    (0.6-1.0)  


 


Estimated GFR    > 60   





    


 


Glucose    106 mg/dL H mg/dL  





    ()  


 


Calcium    9.7 mg/dL mg/dL  





    (8.5-10.4)  


 


Beta HCG, Quant    < 2.39 mIU/mL mIU/mL  





    (0.00-4.83)  


 


Urine Color  MARTHA     





    


 


Urine Appearance  HAZY     





    


 


Urine pH  5.0     





   (5.0-7.5)   


 


Ur Specific Gravity  1.027     





   (1.002-1.030)   


 


Urine Protein  NEGATIVE     





   (NEGATIVE)   


 


Urine Ketones  TRACE  H     





   (NEGATIVE)   


 


Urine Blood  3+  H     





   (NEGATIVE)   


 


Urine Nitrate  NEGATIVE     





   (NEGATIVE)   


 


Urine Bilirubin  NEGATIVE     





   (NEGATIVE)   


 


Urine Urobilinogen  NEGATIVE EU EU    





   (0.2-1.0)   


 


Ur Leukocyte Esterase  TRACE  H     





   (NEGATIVE)   


 


Urine RBC   /hpf H /hpf    





   (0-3)   


 


Urine WBC  5-10 /hpf H /hpf    





   (0-3)   


 


Ur Epithelial Cells  TRACE /lpf /lpf    





   (NONE-1+)   


 


Urine Mucus  3+ /lpf H /lpf    





   (NONE-1+)   


 


Urine Glucose  NEGATIVE     





   (NEGATIVE)   














Departure





- Departure


Disposition: Home, Routine, Self-Care


Clinical Impression: 


 Pelvic pain in female, Vaginal bleeding





Condition: Good


Instructions:  Dysfunctional Uterine Bleeding (ED), Pelvic Pain (ED)


Additional Instructions: 


DISCHARGE INSTRUCTIONS FROM YOUR DOCTOR 


Thank you for visiting our emergency department today. You were treated by a 

physician assistant today and your case was reviewed with our ED Attending 

physician.  Please keep in mind that discharge from the emergency department 

does not mean that there is nothing wrong - it simply means that we have not 

identified an emergency condition that requires further evaluation or treatment 

in the hospital. You should always plan to follow up with primary care for re-

evaluation of your condition in the next 2-3 days. If you have been referred to 

a specialist, please call as soon as possible (today or tomorrow) to schedule 

your follow up appointment at the appropriate time. 





DIAGNOSTIC WORKUP IN THE EMERGENCY DEPARTMENT TONIGHT INCLUDED A COMPREHENSIVE 

PELVIC ULTRASOUND, URINE STUDIES, AND LAB WORK.  IUD IS IN APPROPRIATE POSITION

, NO ABNORMAL FINDINGS IDENTIFIED ON ULTRASOUND ACCORDING TO RADIOLOGY.  LABS 

ARE REASSURING, NO ANEMIA, ELECTROLYTE IMBALANCE.  URINE CULTURE WAS ORDERED, 

ANTIBIOTICS WILL BE HELD PENDING CULTURE RESULTS.  YOUR NOT PREGNANT.  PELVIC 

EXAM AND WET PREP SWABS WERE DISCUSSED AND OFFERED BUT YOU HAVE DECLINED.  

PLEASE CALL YOUR PRIMARY GYNECOLOGIST TOMORROW TO DISCUSS HER SYMPTOMS WITH 

HER.  RETURN TO ED FOR WORSENING PAIN, FEVER OR CHILLS, VOMITING, INCREASED 

VAGINAL BLEEDING, LIGHTHEADEDNESS, HEADACHE OR WEAKNESS, OR ANY OTHER CONCERN. 





People present with illnesses and injuries in different ways, and it is always 

possible that we have missed something. You may always return for re-evaluation 

if symptoms worsen or if they are not improving or if you develop new/different 

symptoms. 


Again, thank you for choosing our emergency department. We hope that you feel 

better.


Referrals: 


NONE *PRIMARY CARE P,. [Primary Care Provider] - As per Instructions


[facility] [Clinic] - As per Instructions
97

## 2019-12-05 NOTE — BRIEF OPERATIVE NOTE - NSICDXBRIEFPREOP_GEN_ALL_CORE_FT
PRE-OP DIAGNOSIS:  Endometrial cancer 05-Dec-2019 22:14:00  Klarissa Martinez  Endometrial cancer 05-Dec-2019 22:13:17  Klarissa Martinez

## 2019-12-05 NOTE — BRIEF OPERATIVE NOTE - OPERATION/FINDINGS
Uterus small anteverted, normal adnexa.   No carcinomatosis or ascites.   Frozen of uterus = CAH, polyp with hyerplasia.

## 2019-12-05 NOTE — BRIEF OPERATIVE NOTE - NSICDXBRIEFPROCEDURE_GEN_ALL_CORE_FT
PROCEDURES:  Robot-assisted hysterectomy with bilateral salpingo-oophorectomy and lymph node dissection 05-Dec-2019 22:11:58  Klarissa Martinez

## 2019-12-06 DIAGNOSIS — Z98.890 OTHER SPECIFIED POSTPROCEDURAL STATES: ICD-10-CM

## 2019-12-06 LAB
ANION GAP SERPL CALC-SCNC: 14 MMO/L — SIGNIFICANT CHANGE UP (ref 7–14)
BASOPHILS # BLD AUTO: 0.02 K/UL — SIGNIFICANT CHANGE UP (ref 0–0.2)
BASOPHILS NFR BLD AUTO: 0.2 % — SIGNIFICANT CHANGE UP (ref 0–2)
BUN SERPL-MCNC: 14 MG/DL — SIGNIFICANT CHANGE UP (ref 7–23)
CALCIUM SERPL-MCNC: 9.1 MG/DL — SIGNIFICANT CHANGE UP (ref 8.4–10.5)
CHLORIDE SERPL-SCNC: 99 MMOL/L — SIGNIFICANT CHANGE UP (ref 98–107)
CO2 SERPL-SCNC: 23 MMOL/L — SIGNIFICANT CHANGE UP (ref 22–31)
CREAT SERPL-MCNC: 0.57 MG/DL — SIGNIFICANT CHANGE UP (ref 0.5–1.3)
EOSINOPHIL # BLD AUTO: 0 K/UL — SIGNIFICANT CHANGE UP (ref 0–0.5)
EOSINOPHIL NFR BLD AUTO: 0 % — SIGNIFICANT CHANGE UP (ref 0–6)
GLUCOSE SERPL-MCNC: 137 MG/DL — HIGH (ref 70–99)
HBV SURFACE AG SER-ACNC: NEGATIVE — SIGNIFICANT CHANGE UP
HCT VFR BLD CALC: 40.5 % — SIGNIFICANT CHANGE UP (ref 34.5–45)
HCV AB S/CO SERPL IA: 0.16 S/CO — SIGNIFICANT CHANGE UP (ref 0–0.99)
HCV AB SERPL-IMP: SIGNIFICANT CHANGE UP
HGB BLD-MCNC: 12.6 G/DL — SIGNIFICANT CHANGE UP (ref 11.5–15.5)
HIV 1+2 AB+HIV1 P24 AG SERPL QL IA: SIGNIFICANT CHANGE UP
IMM GRANULOCYTES NFR BLD AUTO: 0.5 % — SIGNIFICANT CHANGE UP (ref 0–1.5)
LYMPHOCYTES # BLD AUTO: 0.57 K/UL — LOW (ref 1–3.3)
LYMPHOCYTES # BLD AUTO: 6 % — LOW (ref 13–44)
MAGNESIUM SERPL-MCNC: 1.9 MG/DL — SIGNIFICANT CHANGE UP (ref 1.6–2.6)
MCHC RBC-ENTMCNC: 25.9 PG — LOW (ref 27–34)
MCHC RBC-ENTMCNC: 31.1 % — LOW (ref 32–36)
MCV RBC AUTO: 83.3 FL — SIGNIFICANT CHANGE UP (ref 80–100)
MONOCYTES # BLD AUTO: 0.2 K/UL — SIGNIFICANT CHANGE UP (ref 0–0.9)
MONOCYTES NFR BLD AUTO: 2.1 % — SIGNIFICANT CHANGE UP (ref 2–14)
NEUTROPHILS # BLD AUTO: 8.66 K/UL — HIGH (ref 1.8–7.4)
NEUTROPHILS NFR BLD AUTO: 91.2 % — HIGH (ref 43–77)
NON-GYNECOLOGICAL CYTOLOGY STUDY: SIGNIFICANT CHANGE UP
NRBC # FLD: 0 K/UL — SIGNIFICANT CHANGE UP (ref 0–0)
PHOSPHATE SERPL-MCNC: 3.3 MG/DL — SIGNIFICANT CHANGE UP (ref 2.5–4.5)
PLATELET # BLD AUTO: 267 K/UL — SIGNIFICANT CHANGE UP (ref 150–400)
PMV BLD: 10.7 FL — SIGNIFICANT CHANGE UP (ref 7–13)
POTASSIUM SERPL-MCNC: 4.2 MMOL/L — SIGNIFICANT CHANGE UP (ref 3.5–5.3)
POTASSIUM SERPL-SCNC: 4.2 MMOL/L — SIGNIFICANT CHANGE UP (ref 3.5–5.3)
RBC # BLD: 4.86 M/UL — SIGNIFICANT CHANGE UP (ref 3.8–5.2)
RBC # FLD: 13.6 % — SIGNIFICANT CHANGE UP (ref 10.3–14.5)
SODIUM SERPL-SCNC: 136 MMOL/L — SIGNIFICANT CHANGE UP (ref 135–145)
WBC # BLD: 9.5 K/UL — SIGNIFICANT CHANGE UP (ref 3.8–10.5)
WBC # FLD AUTO: 9.5 K/UL — SIGNIFICANT CHANGE UP (ref 3.8–10.5)

## 2019-12-06 RX ORDER — MUPIROCIN 20 MG/G
1 OINTMENT TOPICAL THREE TIMES A DAY
Refills: 0 | Status: DISCONTINUED | OUTPATIENT
Start: 2019-12-06 | End: 2019-12-10

## 2019-12-06 RX ORDER — BENZOCAINE AND MENTHOL 5; 1 G/100ML; G/100ML
1 LIQUID ORAL EVERY 4 HOURS
Refills: 0 | Status: DISCONTINUED | OUTPATIENT
Start: 2019-12-06 | End: 2019-12-10

## 2019-12-06 RX ORDER — SENNA PLUS 8.6 MG/1
2 TABLET ORAL AT BEDTIME
Refills: 0 | Status: DISCONTINUED | OUTPATIENT
Start: 2019-12-06 | End: 2019-12-10

## 2019-12-06 RX ORDER — SIMETHICONE 80 MG/1
80 TABLET, CHEWABLE ORAL
Refills: 0 | Status: DISCONTINUED | OUTPATIENT
Start: 2019-12-06 | End: 2019-12-10

## 2019-12-06 RX ORDER — PREGABALIN 225 MG/1
1000 CAPSULE ORAL DAILY
Refills: 0 | Status: DISCONTINUED | OUTPATIENT
Start: 2019-12-06 | End: 2019-12-10

## 2019-12-06 RX ORDER — SENNA PLUS 8.6 MG/1
2 TABLET ORAL AT BEDTIME
Refills: 0 | Status: DISCONTINUED | OUTPATIENT
Start: 2019-12-06 | End: 2019-12-06

## 2019-12-06 RX ORDER — SODIUM CHLORIDE 9 MG/ML
3 INJECTION INTRAMUSCULAR; INTRAVENOUS; SUBCUTANEOUS EVERY 8 HOURS
Refills: 0 | Status: DISCONTINUED | OUTPATIENT
Start: 2019-12-06 | End: 2019-12-10

## 2019-12-06 RX ORDER — HYDRALAZINE HCL 50 MG
5 TABLET ORAL EVERY 6 HOURS
Refills: 0 | Status: DISCONTINUED | OUTPATIENT
Start: 2019-12-06 | End: 2019-12-08

## 2019-12-06 RX ORDER — FAMOTIDINE 10 MG/ML
20 INJECTION INTRAVENOUS DAILY
Refills: 0 | Status: DISCONTINUED | OUTPATIENT
Start: 2019-12-06 | End: 2019-12-10

## 2019-12-06 RX ORDER — ENOXAPARIN SODIUM 100 MG/ML
40 INJECTION SUBCUTANEOUS DAILY
Refills: 0 | Status: DISCONTINUED | OUTPATIENT
Start: 2019-12-06 | End: 2019-12-10

## 2019-12-06 RX ORDER — LORATADINE 10 MG/1
10 TABLET ORAL DAILY
Refills: 0 | Status: DISCONTINUED | OUTPATIENT
Start: 2019-12-06 | End: 2019-12-10

## 2019-12-06 RX ADMIN — MUPIROCIN 1 APPLICATION(S): 20 OINTMENT TOPICAL at 22:03

## 2019-12-06 RX ADMIN — Medication 30 MILLIGRAM(S): at 22:01

## 2019-12-06 RX ADMIN — BENZOCAINE AND MENTHOL 1 LOZENGE: 5; 1 LIQUID ORAL at 00:47

## 2019-12-06 RX ADMIN — SIMETHICONE 80 MILLIGRAM(S): 80 TABLET, CHEWABLE ORAL at 22:01

## 2019-12-06 RX ADMIN — ENOXAPARIN SODIUM 40 MILLIGRAM(S): 100 INJECTION SUBCUTANEOUS at 13:28

## 2019-12-06 RX ADMIN — LORATADINE 10 MILLIGRAM(S): 10 TABLET ORAL at 22:02

## 2019-12-06 RX ADMIN — Medication 30 MILLIGRAM(S): at 14:54

## 2019-12-06 RX ADMIN — Medication 30 MILLIGRAM(S): at 05:58

## 2019-12-06 RX ADMIN — PREGABALIN 1000 MICROGRAM(S): 225 CAPSULE ORAL at 22:02

## 2019-12-06 RX ADMIN — SODIUM CHLORIDE 125 MILLILITER(S): 9 INJECTION, SOLUTION INTRAVENOUS at 00:48

## 2019-12-06 RX ADMIN — SODIUM CHLORIDE 3 MILLILITER(S): 9 INJECTION INTRAMUSCULAR; INTRAVENOUS; SUBCUTANEOUS at 14:19

## 2019-12-06 RX ADMIN — Medication 650 MILLIGRAM(S): at 05:58

## 2019-12-06 RX ADMIN — Medication 30 MILLIGRAM(S): at 22:31

## 2019-12-06 RX ADMIN — FAMOTIDINE 20 MILLIGRAM(S): 10 INJECTION INTRAVENOUS at 09:19

## 2019-12-06 RX ADMIN — SODIUM CHLORIDE 3 MILLILITER(S): 9 INJECTION INTRAMUSCULAR; INTRAVENOUS; SUBCUTANEOUS at 22:07

## 2019-12-06 RX ADMIN — MUPIROCIN 1 APPLICATION(S): 20 OINTMENT TOPICAL at 14:53

## 2019-12-06 RX ADMIN — Medication 650 MILLIGRAM(S): at 13:28

## 2019-12-06 NOTE — PROGRESS NOTE ADULT - SUBJECTIVE AND OBJECTIVE BOX
ANESTHESIA POSTOP CHECK    74y Female POSTOP DAY 1 S/P   [x ] General Anesthesia  [ ] [facility]  [ ] MAC    Vital Signs Last 24 Hrs  T(C): 36.6 (06 Dec 2019 09:32), Max: 36.7 (05 Dec 2019 16:47)  T(F): 97.9 (06 Dec 2019 09:32), Max: 98.1 (05 Dec 2019 16:47)  HR: 67 (06 Dec 2019 09:32) (47 - 67)  BP: 127/59 (06 Dec 2019 09:32) (127/59 - 171/81)  BP(mean): 91 (05 Dec 2019 23:45) (83 - 103)  RR: 18 (06 Dec 2019 09:32) (12 - 18)  SpO2: 96% (06 Dec 2019 09:32) (94% - 100%)  I&O's Summary    05 Dec 2019 07:01  -  06 Dec 2019 07:00  --------------------------------------------------------  IN: 780 mL / OUT: 600 mL / NET: 180 mL    06 Dec 2019 07:01  -  06 Dec 2019 10:54  --------------------------------------------------------  IN: 0 mL / OUT: 350 mL / NET: -350 mL        [x ] NO APPARENT ANESTHESIA COMPLICATIONS      Comments:

## 2019-12-06 NOTE — CHART NOTE - NSCHARTNOTEFT_GEN_A_CORE
R2    Patient seen and examined at bedside, recently post-op. No acute complaints at this time. Denies CP, SOB, N/V, fevers, and chills.    Vital Signs Last 24 Hours  T(C): 36.3 (12-06-19 @ 00:19), Max: 36.7 (12-05-19 @ 16:47)  HR: 49 (12-06-19 @ 00:19) (47 - 60)  BP: 152/51 (12-06-19 @ 00:19) (146/50 - 171/81)  RR: 15 (12-06-19 @ 00:19) (12 - 17)  SpO2: 95% (12-06-19 @ 00:19) (94% - 100%)    I&O's Summary    05 Dec 2019 07:01  -  06 Dec 2019 02:57  --------------------------------------------------------  IN: 280 mL / OUT: 0 mL / NET: 280 mL        Physical Exam:  General: NAD, sleeping comfortably   CV: NR, RR, S1, S2, no M/R/G  Lungs: CTA-B  Abdomen: Soft, appropriately tender, non-distended, no rebound or guarding  Incision: 4 port sites CDI, op-sites   Primafit in place  Ext: No pain, venodynes in place    Labs:      MEDICATIONS  (STANDING):  acetaminophen   Tablet .. 650 milliGRAM(s) Oral every 6 hours  heparin  Injectable 5000 Unit(s) SubCutaneous every 8 hours  hydrALAZINE Injectable 5 milliGRAM(s) IV Push every 6 hours  ketorolac   Injectable 30 milliGRAM(s) IV Push every 8 hours  lactated ringers. 1000 milliLiter(s) (125 mL/Hr) IV Continuous <Continuous>    MEDICATIONS  (PRN):  benzocaine 15 mG/menthol 3.6 mG (Sugar-Free) Lozenge 1 Lozenge Oral every 4 hours PRN Sore Throat  HYDROmorphone  Injectable 0.5 milliGRAM(s) IV Push every 10 minutes PRN Moderate Pain (4 - 6)  ondansetron Injectable 4 milliGRAM(s) IV Push once PRN Nausea and/or Vomiting  oxyCODONE    IR 5 milliGRAM(s) Oral every 4 hours PRN Moderate Pain (4 - 6)  oxyCODONE    IR 10 milliGRAM(s) Oral every 6 hours PRN Severe Pain (7 - 10)    73yo POD0 s/p Total Abdominal Hysterectomy, BSO, PPALND. Frozen = CAH. Patient hemodynamically stable and resting comfortably.  Neuro: c/w toradol and tylenol for analgesia  CV: VSS, CBC in AM  Pulm: Saturating well on room air, encourage incentive spirometry. Continuous pulse ox 2/2 BMI  GI: Regular diet  : DTV @ 4a, primafit in place, AM BMP/Mg/Phos  Heme: HSQ and SCDs for DVT ppx  Dispo: Continue routine post-op care, PT consult, would care consult    SVETLANA Ibarra PGY2

## 2019-12-06 NOTE — PROGRESS NOTE ADULT - SUBJECTIVE AND OBJECTIVE BOX
Gyn ONC Progress Note POD #1 HD #2    Pt seen and examined at bedside. No events overnight. Pain well controlled per patient, currently 6/10. She did not ambulate overnight. She has voided via Primafit. She has not yet passed flatus. Tolerating liquids, but not yet attempted solid food. Denies nausea or emesis, lightheadedness or dizziness.  Pt denies fever, chills, chest pain, SOB, nausea, vomiting, lightheadedness, dizziness.      Objective:  T(F): 97.8 (12-06-19 @ 05:54), Max: 98.1 (12-05-19 @ 16:47)  HR: 58 (12-06-19 @ 05:54) (47 - 60)  BP: 143/56 (12-06-19 @ 05:54) (143/56 - 171/81)  RR: 16 (12-06-19 @ 05:54) (12 - 17)  SpO2: 96% (12-06-19 @ 05:54) (94% - 100%)    I&O's Summary  05 Dec 2019 07:01  -  06 Dec 2019 06:30  --------------------------------------------------------  IN: 280 mL / OUT: 300 mL / NET: -20 mL      CAPILLARY BLOOD GLUCOSE  POCT Blood Glucose.: 78 mg/dL (05 Dec 2019 17:15)      MEDICATIONS  (STANDING):  acetaminophen   Tablet .. 650 milliGRAM(s) Oral every 6 hours  famotidine    Tablet 20 milliGRAM(s) Oral daily  heparin  Injectable 5000 Unit(s) SubCutaneous every 8 hours  hydrALAZINE Injectable 5 milliGRAM(s) IV Push every 6 hours  ketorolac   Injectable 30 milliGRAM(s) IV Push every 8 hours  lactated ringers. 1000 milliLiter(s) (125 mL/Hr) IV Continuous <Continuous>    MEDICATIONS  (PRN):  benzocaine 15 mG/menthol 3.6 mG (Sugar-Free) Lozenge 1 Lozenge Oral every 4 hours PRN Sore Throat  HYDROmorphone  Injectable 0.5 milliGRAM(s) IV Push every 10 minutes PRN Moderate Pain (4 - 6)  ondansetron Injectable 4 milliGRAM(s) IV Push once PRN Nausea and/or Vomiting  oxyCODONE    IR 5 milliGRAM(s) Oral every 4 hours PRN Moderate Pain (4 - 6)  oxyCODONE    IR 10 milliGRAM(s) Oral every 6 hours PRN Severe Pain (7 - 10)      Physical Exam:  Constitutional: NAD, A+O x3  CV: RRR  Lungs: CTA bilaterally   Abdomen: Soft, distended. Nontender to palpation. No guarding or rebound tenderness. +BS.   Incision: Clean, dry, intact. Steri strips in place.   : Primafit in place, with 400cc yellow urine in suction container.   Extremities: 3+ LE edema bilaterally, L > R. LLE 1x2cm excoriation. No calf tenderness bilaterally. Venodynes in place.

## 2019-12-06 NOTE — ADVANCED PRACTICE NURSE CONSULT - REASON FOR CONSULT
Patient seen on skin care rounds after wound care referral received for assessment of skin impairment and recommendations of topical management. Chart reviewed:  Daljit 20, WBC 9.50, BMI 40.9kg/m2. Patient interviewed, reports chronic use of compression therapy applied by her daughter who is a lymphedema therapy specialists Patient reports acute wound a couple of weeks ago. Patient reports previous ulcers but not recent ones since 5 years ago.  Patient admitted for endometrial cancer s/p hysterectomy.

## 2019-12-06 NOTE — PHYSICAL THERAPY INITIAL EVALUATION ADULT - ADDITIONAL COMMENTS
Patient lives in a home with steps to enter and within; patient uses Single Axis Cane or rolling walker at baseline

## 2019-12-06 NOTE — ADVANCED PRACTICE NURSE CONSULT - ASSESSMENT
A&Ox4, currently bedbound(at home walks with cane), abdomen with intact scattered Steri-strips, continent of urine and stool. Bridge of the nose with a intact scab (patient reports recent biopsy to r/o basal cell carcinoma). Skin warm, dry with increased moisture in intertriginous folds,  scattered areas of hyperpigmentation and hypopigmentation, scattered areas of ecchymosis on bilateral upper extremities. Blanchable erythema on bilateral heels.     Moisture associated dermatitis in bilateral groin and buttocks as evident by chronic hyperpigmentation and moist. Intact skin.     Vascular: 2 intact scabs in left lower leg. Dry, flaky skin. Good toe hygiene. Lymphedema + worse in left dorsal foot.   Capillary refill <3 seconds. DP/PT pulses non palpable secondary to non pitting edema, with biphasic doppler sounds in left DP/PT, right DP/PT with monophasic sounds(unsure of accuracy secondary to non pitting edema).     Left medial lower leg wound of unknown etiology complicated by Lymphedema (patient denies trauma, linear injury)- 5.4cmx0.5cmx0.3cm. Tissue type presents as 90% yellow dry fibrin film and 10% pink tissue. No drainage. NO odor. Periwound skin with blanching erythema circumferentially extending 1.5cm. NO increased warmth, no induration, no edema. Goals of care: reduce/control bioburden, maintain a moist environment for wound healing, monitor for tissue type changes, protect periwound skin.     GYN MD at the bedside during skin assessment.

## 2019-12-06 NOTE — ADVANCED PRACTICE NURSE CONSULT - RECOMMEDATIONS
Patient pending discharge to rehab   Follow up with dermatology for results of biopsy of the nose  Upon discharge, recommend follow up care at [facility]: [phone]. Address: [address] [place] [address] for long term lymphedema management     Topical Recommendation     Bilateral groin: Clean with soap and water, pat dry, place Interdry textile sheeting, under intertriginous folds leaving 2 inches exposed at ends to wick, remove to wash & dry affected area, then replace. Individual sheeting may be used for up to 5 days unless soiled.     Left medial lower leg: Clean with NS. Pat dry. Apply Liquid barrier film to periwound skin. Apply Medihoney gel to open area. Cover with sterile gauze. Secure with Kerlix. Apply ACE wraps (50/50 technique from dorsal foot to knee). Change daily.     Right leg: Apply modified compression with ACE wraps. Change daily.     Continue low air loss bed therapy, continue heel elevation with Z-flex fluidized positioning boots, continue to turn & reposition q2h with Z-shannon fluidized positioning device, continue moisture management with barrier creams (Teri barrier cream to buttocks) & single breathable pad, continue measures to decrease friction/shear/pressure. Continue with nutritional support as per dietary/orders.  Plan discussed with patient. Patient educated on topical wound therapy. Patient verbalized understanding and reports she will continue with compression therapy at home.     Please contact Wound Care Service Line if we can be of further assistance (ext [phone]).

## 2019-12-06 NOTE — PHYSICAL THERAPY INITIAL EVALUATION ADULT - PERTINENT HX OF CURRENT PROBLEM, REHAB EVAL
74y female with PMHx of hypertension and bilateral lower extremity lymphedema now POD#1 s/p RA-TLH, BSO, SLNM and PPALND for FIGO grade 1 endometrial carcinoma, currently stable and recovering well postoperatively.

## 2019-12-06 NOTE — PROGRESS NOTE ADULT - PROBLEM SELECTOR PLAN 1
CV: Hemodynamically stable. Continue hydralazine for blood pressures. AM labs pending.   Pulm: Saturating well on RA. Increase incentive spirometry.  GI: Regular diet ordered, patient tolerated liquids overnight.  : Primafit in place, with adequate urine output overnight.   Heme: Continue Heparin and Venodynes for DVT prophylaxis.   Neuro: Continue Toradol, Tylenol and Oxycodone for pain control.  Dispo: Continue inpatient postoperative care. Patient is to be seen by physical therapy today, as well as wound care for assistance with lower extremity lymphedema.     Chandler, PGY-1

## 2019-12-07 RX ORDER — POLYETHYLENE GLYCOL 3350 17 G/17G
17 POWDER, FOR SOLUTION ORAL AT BEDTIME
Refills: 0 | Status: DISCONTINUED | OUTPATIENT
Start: 2019-12-07 | End: 2019-12-10

## 2019-12-07 RX ADMIN — Medication 650 MILLIGRAM(S): at 12:36

## 2019-12-07 RX ADMIN — Medication 5 MILLIGRAM(S): at 12:23

## 2019-12-07 RX ADMIN — SODIUM CHLORIDE 3 MILLILITER(S): 9 INJECTION INTRAMUSCULAR; INTRAVENOUS; SUBCUTANEOUS at 15:36

## 2019-12-07 RX ADMIN — Medication 650 MILLIGRAM(S): at 18:30

## 2019-12-07 RX ADMIN — FAMOTIDINE 20 MILLIGRAM(S): 10 INJECTION INTRAVENOUS at 12:23

## 2019-12-07 RX ADMIN — SODIUM CHLORIDE 3 MILLILITER(S): 9 INJECTION INTRAMUSCULAR; INTRAVENOUS; SUBCUTANEOUS at 21:46

## 2019-12-07 RX ADMIN — Medication 30 MILLIGRAM(S): at 16:01

## 2019-12-07 RX ADMIN — Medication 650 MILLIGRAM(S): at 12:23

## 2019-12-07 RX ADMIN — SODIUM CHLORIDE 3 MILLILITER(S): 9 INJECTION INTRAMUSCULAR; INTRAVENOUS; SUBCUTANEOUS at 06:20

## 2019-12-07 RX ADMIN — PREGABALIN 1000 MICROGRAM(S): 225 CAPSULE ORAL at 12:23

## 2019-12-07 RX ADMIN — SENNA PLUS 2 TABLET(S): 8.6 TABLET ORAL at 21:45

## 2019-12-07 RX ADMIN — Medication 30 MILLIGRAM(S): at 06:52

## 2019-12-07 RX ADMIN — Medication 30 MILLIGRAM(S): at 16:31

## 2019-12-07 RX ADMIN — MUPIROCIN 1 APPLICATION(S): 20 OINTMENT TOPICAL at 16:01

## 2019-12-07 RX ADMIN — Medication 650 MILLIGRAM(S): at 17:58

## 2019-12-07 RX ADMIN — ENOXAPARIN SODIUM 40 MILLIGRAM(S): 100 INJECTION SUBCUTANEOUS at 12:23

## 2019-12-07 RX ADMIN — LORATADINE 10 MILLIGRAM(S): 10 TABLET ORAL at 12:24

## 2019-12-07 RX ADMIN — Medication 30 MILLIGRAM(S): at 06:22

## 2019-12-07 NOTE — PROGRESS NOTE ADULT - PROBLEM SELECTOR PLAN 1
CV: Hemodynamically stable. Continue hydralazine for blood pressures.   Pulm: Saturating well on RA. Increase incentive spirometry.  GI: Regular diet  : Primafit in place, with adequate urine output overnight (750cc/12h).   Heme: Continue Lovenox and Venodynes for DVT prophylaxis.   Neuro: Continue Toradol, Tylenol and Oxycodone for pain control.  Dispo: Continue inpatient postoperative care. For rehab on discharge per PT. follow up outpatient wound care.    JOSE MARTIN Pate, PGY2

## 2019-12-07 NOTE — CHART NOTE - NSCHARTNOTEFT_GEN_A_CORE
Notified that patient desaturated to 89-90% when laying flat. Patient denies any complaints. No CP, SOB, difficulty breathing. She used the incentive spirometer earlier in the day and coughed up some sputum; however she has not used it since. Patient does not feel any differently from how she felt during the day. Denies lightheadedness and dizziness. No calf pain.  Nurse placed patient on NC and she is saturating well.   Heart RRR, lungs CTAB. Breathing non-labored. HR 60s  Encouraging incentive spirometry and will leave patient on supplemental O2 overnight.    SVETLANA Ibarra PGY2  d/w Dr. Olmos

## 2019-12-07 NOTE — PROGRESS NOTE ADULT - SUBJECTIVE AND OBJECTIVE BOX
R2 Gyn ONC Progress Note POD#  HD#    Subjective:   Pt seen and examined at bedside. No events overnight.   Pain well controlled.   Patient ambulating.   Passing flatus.   Tolerating regular diet.   Pt denies fever, chills, chest pain, SOB, nausea, vomiting, lightheadedness, dizziness.      Objective:  T(F): 97.5 (12-07-19 @ 06:20), Max: 98.8 (12-07-19 @ 00:06)  HR: 58 (12-07-19 @ 06:20) (58 - 67)  BP: 150/56 (12-07-19 @ 06:20) (110/71 - 150/56)  RR: 16 (12-07-19 @ 06:20) (16 - 18)  SpO2: 98% (12-07-19 @ 06:20) (95% - 98%)  Wt(kg): --  I&O's Summary    06 Dec 2019 07:01  -  07 Dec 2019 07:00  --------------------------------------------------------  IN: 500 mL / OUT: 1500 mL / NET: -1000 mL      CAPILLARY BLOOD GLUCOSE          MEDICATIONS  (STANDING):  acetaminophen   Tablet .. 650 milliGRAM(s) Oral every 6 hours  cyanocobalamin 1000 MICROGram(s) Oral daily  enoxaparin Injectable 40 milliGRAM(s) SubCutaneous daily  famotidine    Tablet 20 milliGRAM(s) Oral daily  hydrALAZINE Injectable 5 milliGRAM(s) IV Push every 6 hours  ketorolac   Injectable 30 milliGRAM(s) IV Push every 8 hours  loratadine 10 milliGRAM(s) Oral daily  mupirocin 2% Ointment 1 Application(s) Topical three times a day  senna 2 Tablet(s) Oral at bedtime  sodium chloride 0.9% lock flush 3 milliLiter(s) IV Push every 8 hours    MEDICATIONS  (PRN):  benzocaine 15 mG/menthol 3.6 mG (Sugar-Free) Lozenge 1 Lozenge Oral every 4 hours PRN Sore Throat  oxyCODONE    IR 5 milliGRAM(s) Oral every 4 hours PRN Moderate Pain (4 - 6)  oxyCODONE    IR 10 milliGRAM(s) Oral every 6 hours PRN Severe Pain (7 - 10)  simethicone 80 milliGRAM(s) Chew four times a day PRN Gas      Physical Exam:  Constitutional: NAD, A+O x3  CV: RR S1S2 no m/r/g  Lungs: CTA b/l, good air flow b/l  Abdomen: soft, softly-distended, appropriately-tender. no guarding, no rebound, normal bowel sounds  Incision: clean, dry, intact  Extremities: no lower extremity edema or calf tenderness bilaterally; venodynes in place    LABS:  12-06    136    |  99     |  14     ----------------------------<  137<H>  4.2     |  23     |  0.57     Ca    9.1        06 Dec 2019 06:00  Phos  3.3       12-06  Mg     1.9       12-06                Assessment/Plan: 74y POD# , s/p     Neuro: PCA for pain control vs. Continue PO pain medication  CV: Hemodynamically stable  Pulm: Saturating well on RA. Increase incentive spirometry.  GI: Advance diet as tolerated  : Strickland in place. UOP XXX/12h->XXX/h, adequate  Heme: Continue HSQ/Lovenox/Venodynes for DVT ppx. Increase OOB.    ID: Afebrile  Endo: No active issues   FEN: LR@125. Electrolytes wnl  Dispo: continue inpatient care    Jeaneth Pate, PGY2 R2 Gyn ONC Progress Note POD#2  HD#3    Subjective:   Pt seen and examined at bedside. No events overnight.   Pain well controlled.   Patient ambulating.   Passing flatus.   Tolerating regular diet.   Pt denies fever, chills, chest pain, SOB, nausea, vomiting, lightheadedness, dizziness.      Objective:  T(F): 97.5 (12-07-19 @ 06:20), Max: 98.8 (12-07-19 @ 00:06)  HR: 58 (12-07-19 @ 06:20) (58 - 67)  BP: 150/56 (12-07-19 @ 06:20) (110/71 - 150/56)  RR: 16 (12-07-19 @ 06:20) (16 - 18)  SpO2: 98% (12-07-19 @ 06:20) (95% - 98%)  Wt(kg): --  I&O's Summary    06 Dec 2019 07:01  -  07 Dec 2019 07:00  --------------------------------------------------------  IN: 500 mL / OUT: 1500 mL / NET: -1000 mL      CAPILLARY BLOOD GLUCOSE          MEDICATIONS  (STANDING):  acetaminophen   Tablet .. 650 milliGRAM(s) Oral every 6 hours  cyanocobalamin 1000 MICROGram(s) Oral daily  enoxaparin Injectable 40 milliGRAM(s) SubCutaneous daily  famotidine    Tablet 20 milliGRAM(s) Oral daily  hydrALAZINE Injectable 5 milliGRAM(s) IV Push every 6 hours  ketorolac   Injectable 30 milliGRAM(s) IV Push every 8 hours  loratadine 10 milliGRAM(s) Oral daily  mupirocin 2% Ointment 1 Application(s) Topical three times a day  senna 2 Tablet(s) Oral at bedtime  sodium chloride 0.9% lock flush 3 milliLiter(s) IV Push every 8 hours    MEDICATIONS  (PRN):  benzocaine 15 mG/menthol 3.6 mG (Sugar-Free) Lozenge 1 Lozenge Oral every 4 hours PRN Sore Throat  oxyCODONE    IR 5 milliGRAM(s) Oral every 4 hours PRN Moderate Pain (4 - 6)  oxyCODONE    IR 10 milliGRAM(s) Oral every 6 hours PRN Severe Pain (7 - 10)  simethicone 80 milliGRAM(s) Chew four times a day PRN Gas      Physical Exam:  Constitutional: NAD, A+O x3  CV: RR S1S2 no m/r/g  Lungs: CTA b/l, good air flow b/l  Abdomen: soft, softly-distended, appropriately-tender. no guarding, no rebound, normal bowel sounds  Incision: clean, dry, intact  Extremities: no lower extremity edema or calf tenderness bilaterally; venodynes in place    LABS:  12-06    136    |  99     |  14     ----------------------------<  137<H>  4.2     |  23     |  0.57     Ca    9.1        06 Dec 2019 06:00  Phos  3.3       12-06  Mg     1.9       12-06                Assessment/Plan: 74y POD# , s/p     Neuro: PCA for pain control vs. Continue PO pain medication  CV: Hemodynamically stable  Pulm: Saturating well on RA. Increase incentive spirometry.  GI: Advance diet as tolerated  : Strickland in place. UOP XXX/12h->XXX/h, adequate  Heme: Continue HSQ/Lovenox/Venodynes for DVT ppx. Increase OOB.    ID: Afebrile  Endo: No active issues   FEN: LR@125. Electrolytes wnl  Dispo: continue inpatient care    Jeaneth Pate, PGY2

## 2019-12-08 RX ADMIN — Medication 30 MILLIGRAM(S): at 06:40

## 2019-12-08 RX ADMIN — ENOXAPARIN SODIUM 40 MILLIGRAM(S): 100 INJECTION SUBCUTANEOUS at 12:28

## 2019-12-08 RX ADMIN — PREGABALIN 1000 MICROGRAM(S): 225 CAPSULE ORAL at 12:29

## 2019-12-08 RX ADMIN — SODIUM CHLORIDE 3 MILLILITER(S): 9 INJECTION INTRAMUSCULAR; INTRAVENOUS; SUBCUTANEOUS at 21:20

## 2019-12-08 RX ADMIN — FAMOTIDINE 20 MILLIGRAM(S): 10 INJECTION INTRAVENOUS at 12:28

## 2019-12-08 RX ADMIN — MUPIROCIN 1 APPLICATION(S): 20 OINTMENT TOPICAL at 10:02

## 2019-12-08 RX ADMIN — Medication 30 MILLIGRAM(S): at 01:01

## 2019-12-08 RX ADMIN — MUPIROCIN 1 APPLICATION(S): 20 OINTMENT TOPICAL at 01:03

## 2019-12-08 RX ADMIN — SIMETHICONE 80 MILLIGRAM(S): 80 TABLET, CHEWABLE ORAL at 14:40

## 2019-12-08 RX ADMIN — MUPIROCIN 1 APPLICATION(S): 20 OINTMENT TOPICAL at 16:33

## 2019-12-08 RX ADMIN — SIMETHICONE 80 MILLIGRAM(S): 80 TABLET, CHEWABLE ORAL at 19:11

## 2019-12-08 RX ADMIN — LORATADINE 10 MILLIGRAM(S): 10 TABLET ORAL at 12:29

## 2019-12-08 RX ADMIN — SENNA PLUS 2 TABLET(S): 8.6 TABLET ORAL at 21:12

## 2019-12-08 RX ADMIN — Medication 30 MILLIGRAM(S): at 06:08

## 2019-12-08 RX ADMIN — MUPIROCIN 1 APPLICATION(S): 20 OINTMENT TOPICAL at 01:04

## 2019-12-08 RX ADMIN — SODIUM CHLORIDE 3 MILLILITER(S): 9 INJECTION INTRAMUSCULAR; INTRAVENOUS; SUBCUTANEOUS at 14:09

## 2019-12-08 RX ADMIN — Medication 30 MILLIGRAM(S): at 01:40

## 2019-12-08 RX ADMIN — SODIUM CHLORIDE 3 MILLILITER(S): 9 INJECTION INTRAMUSCULAR; INTRAVENOUS; SUBCUTANEOUS at 06:07

## 2019-12-08 NOTE — PROGRESS NOTE ADULT - PROBLEM SELECTOR PLAN 1
CV: Hemodynamically stable. Continue hydralazine for blood pressures.   Pulm: Saturating well on RA. Increase incentive spirometry.  GI: Regular diet  : Primafit in place, with adequate urine output overnight (550cc/12h).   Heme: Continue Lovenox and Venodynes for DVT prophylaxis.   Neuro: Continue Toradol, Tylenol and Oxycodone for pain control.  Dispo: Continue inpatient postoperative care. For rehab on discharge per PT. follow up outpatient wound care.

## 2019-12-08 NOTE — PROGRESS NOTE ADULT - SUBJECTIVE AND OBJECTIVE BOX
Gyn Onc Note    Pt seen and examined at bedside. No events overnight. Pain well controlled. Patient ambulating out of bed to bathroom. +BM. Tolerating regular diet. Pt denies fever, chills, chest pain, SOB, nausea, vomiting, lightheadedness, dizziness.      Objective:  T(F): 98.4 (12-08-19 @ 06:06), Max: 98.4 (12-07-19 @ 17:48)  HR: 61 (12-08-19 @ 06:06) (61 - 72)  BP: 156/55 (12-08-19 @ 06:06) (140/52 - 156/60)  RR: 17 (12-08-19 @ 06:06) (16 - 18)  SpO2: 100% (12-08-19 @ 06:06) (96% - 100%)  Wt(kg): --  I&O's Summary    07 Dec 2019 07:01  -  08 Dec 2019 07:00  --------------------------------------------------------  IN: 150 mL / OUT: 1751 mL / NET: -1601 mL      MEDICATIONS  (STANDING):  acetaminophen   Tablet .. 650 milliGRAM(s) Oral every 6 hours  cyanocobalamin 1000 MICROGram(s) Oral daily  enoxaparin Injectable 40 milliGRAM(s) SubCutaneous daily  famotidine    Tablet 20 milliGRAM(s) Oral daily  hydrALAZINE Injectable 5 milliGRAM(s) IV Push every 6 hours  ketorolac   Injectable 30 milliGRAM(s) IV Push every 8 hours  loratadine 10 milliGRAM(s) Oral daily  mupirocin 2% Ointment 1 Application(s) Topical three times a day  senna 2 Tablet(s) Oral at bedtime  sodium chloride 0.9% lock flush 3 milliLiter(s) IV Push every 8 hours    MEDICATIONS  (PRN):  benzocaine 15 mG/menthol 3.6 mG (Sugar-Free) Lozenge 1 Lozenge Oral every 4 hours PRN Sore Throat  oxyCODONE    IR 5 milliGRAM(s) Oral every 4 hours PRN Moderate Pain (4 - 6)  oxyCODONE    IR 10 milliGRAM(s) Oral every 6 hours PRN Severe Pain (7 - 10)  polyethylene glycol 3350 17 Gram(s) Oral at bedtime PRN Constipation  simethicone 80 milliGRAM(s) Chew four times a day PRN Gas      Physical Exam:  Constitutional: NAD, A+O x3  CV: RRR  Lungs: clear to auscultation bilaterally  Abdomen:  +BS, soft, NT, ND  Incision: port sites, clean, dry, intact  Extremities: no lower extremity edema or calf tenderness bilaterally, venodynes in place

## 2019-12-09 LAB
APPEARANCE UR: CLEAR — SIGNIFICANT CHANGE UP
BILIRUB UR-MCNC: NEGATIVE — SIGNIFICANT CHANGE UP
BLOOD UR QL VISUAL: NEGATIVE — SIGNIFICANT CHANGE UP
COLOR SPEC: SIGNIFICANT CHANGE UP
GLUCOSE UR-MCNC: NEGATIVE — SIGNIFICANT CHANGE UP
KETONES UR-MCNC: NEGATIVE — SIGNIFICANT CHANGE UP
LEUKOCYTE ESTERASE UR-ACNC: NEGATIVE — SIGNIFICANT CHANGE UP
NITRITE UR-MCNC: NEGATIVE — SIGNIFICANT CHANGE UP
PH UR: 7.5 — SIGNIFICANT CHANGE UP (ref 5–8)
PROT UR-MCNC: NEGATIVE — SIGNIFICANT CHANGE UP
SP GR SPEC: 1.01 — SIGNIFICANT CHANGE UP (ref 1–1.04)
UROBILINOGEN FLD QL: NORMAL — SIGNIFICANT CHANGE UP

## 2019-12-09 RX ORDER — ACETAMINOPHEN 500 MG
2 TABLET ORAL
Qty: 0 | Refills: 0 | DISCHARGE
Start: 2019-12-09

## 2019-12-09 RX ADMIN — FAMOTIDINE 20 MILLIGRAM(S): 10 INJECTION INTRAVENOUS at 12:35

## 2019-12-09 RX ADMIN — Medication 30 MILLIGRAM(S): at 17:53

## 2019-12-09 RX ADMIN — SIMETHICONE 80 MILLIGRAM(S): 80 TABLET, CHEWABLE ORAL at 05:11

## 2019-12-09 RX ADMIN — LORATADINE 10 MILLIGRAM(S): 10 TABLET ORAL at 12:35

## 2019-12-09 RX ADMIN — MUPIROCIN 1 APPLICATION(S): 20 OINTMENT TOPICAL at 11:00

## 2019-12-09 RX ADMIN — ENOXAPARIN SODIUM 40 MILLIGRAM(S): 100 INJECTION SUBCUTANEOUS at 12:34

## 2019-12-09 RX ADMIN — SODIUM CHLORIDE 3 MILLILITER(S): 9 INJECTION INTRAMUSCULAR; INTRAVENOUS; SUBCUTANEOUS at 05:11

## 2019-12-09 RX ADMIN — SODIUM CHLORIDE 3 MILLILITER(S): 9 INJECTION INTRAMUSCULAR; INTRAVENOUS; SUBCUTANEOUS at 21:53

## 2019-12-09 RX ADMIN — SODIUM CHLORIDE 3 MILLILITER(S): 9 INJECTION INTRAMUSCULAR; INTRAVENOUS; SUBCUTANEOUS at 14:09

## 2019-12-09 RX ADMIN — MUPIROCIN 1 APPLICATION(S): 20 OINTMENT TOPICAL at 21:54

## 2019-12-09 RX ADMIN — PREGABALIN 1000 MICROGRAM(S): 225 CAPSULE ORAL at 12:35

## 2019-12-09 NOTE — DISCHARGE NOTE PROVIDER - CARE PROVIDER_API CALL
Josiane Saleh)  Gynecologic Oncology; Obstetrics and Gynecology  [facility] [facility], [address]th [place] [address]  Phone: [phone] option 2  Fax: [phone]  Scheduled Appointment: 12/20/2019 01:00 PM

## 2019-12-09 NOTE — CHART NOTE - NSCHARTNOTEFT_GEN_A_CORE
74y female now POD#4 s/p RA-TLH, BSO, SLNM and PPALND for FIGO grade 1 endometrial carcinoma. Patient does not require chemotherapy. 74y female now POD#4 s/p RA-TLH, BSO, SLND for FIGO grade 1 endometrial carcinoma. Patient does not require chemotherapy.

## 2019-12-09 NOTE — DISCHARGE NOTE PROVIDER - NSDCFUSCHEDAPPT_GEN_ALL_CORE_FT
DANITA JACINTO ; 12/20/2019 ; hospitals OB/GYN ONC [address] [facility] DANITA JACINTO ; 01/07/2020 ; P OB/GYN ONC [address] [facility] DANITA JACINTO ; 01/17/2020 ; P OB/GYN ONC [address] [facility]

## 2019-12-09 NOTE — PROGRESS NOTE ADULT - SUBJECTIVE AND OBJECTIVE BOX
Gyn ONC Progress Note POD #4 HD#5    Pt seen and examined at bedside. No events overnight. Pain well controlled. Passing flatus. Tolerating regular diet. She endorses nasal congestion and URI symptoms, however denies fever, chills, chest pain, SOB, nausea, vomiting, lightheadedness or dizziness.        Objective:  T(F): 98.3 (12-09-19 @ 05:19), Max: 99 (12-08-19 @ 10:55)  HR: 62 (12-09-19 @ 05:19) (62 - 72)  BP: 153/61 (12-09-19 @ 05:19) (134/66 - 160/59)  RR: 17 (12-09-19 @ 05:19) (17 - 19)  SpO2: 99% (12-09-19 @ 05:19) (96% - 100%)    I&O's Summary  07 Dec 2019 07:01  -  08 Dec 2019 07:00  --------------------------------------------------------  IN: 150 mL / OUT: 1751 mL / NET: -1601 mL    08 Dec 2019 07:01  -  09 Dec 2019 06:53  --------------------------------------------------------  IN: 100 mL / OUT: 851 mL / NET: -751 mL      MEDICATIONS  (STANDING):  acetaminophen   Tablet .. 650 milliGRAM(s) Oral every 6 hours  cyanocobalamin 1000 MICROGram(s) Oral daily  enoxaparin Injectable 40 milliGRAM(s) SubCutaneous daily  famotidine    Tablet 20 milliGRAM(s) Oral daily  ketorolac   Injectable 30 milliGRAM(s) IV Push every 8 hours  loratadine 10 milliGRAM(s) Oral daily  mupirocin 2% Ointment 1 Application(s) Topical three times a day  senna 2 Tablet(s) Oral at bedtime  sodium chloride 0.9% lock flush 3 milliLiter(s) IV Push every 8 hours    MEDICATIONS  (PRN):  benzocaine 15 mG/menthol 3.6 mG (Sugar-Free) Lozenge 1 Lozenge Oral every 4 hours PRN Sore Throat  oxyCODONE    IR 5 milliGRAM(s) Oral every 4 hours PRN Moderate Pain (4 - 6)  oxyCODONE    IR 10 milliGRAM(s) Oral every 6 hours PRN Severe Pain (7 - 10)  polyethylene glycol 3350 17 Gram(s) Oral at bedtime PRN Constipation  simethicone 80 milliGRAM(s) Chew four times a day PRN Gas      Physical Exam:  Constitutional: NAD, A+O x3  CV: RRR  Lungs: CTA B/L, no crackles. Good air movement throughout.   Abdomen: Soft, mildly distended, no guarding, no rebound tenderness. Normal bowel sounds.   Incisions: Clean, dry, intact. Steri-strips in place.   : Primafit in place. Effective. 200cc yellow urine in suction.   Extremities: Bilateral LE lymphedema. Bilateral calves wrapped with ace bandages. Venodynes in place. Gyn ONC Progress Note POD #4 HD#5    Pt seen and examined at bedside. No events overnight. Pain well controlled. Passing flatus. Tolerating regular diet. Ambulated twice yesterday with assistance.  Primafit in place.  Complains of voiding very frequently; denies any dysuria. Light vaginal spotting with wiping.  She endorses nasal congestion and URI symptoms; however denies fever, chills, cough, chest pain, SOB, nausea, vomiting, lightheadedness or dizziness.        Objective:  T(F): 98.3 (12-09-19 @ 05:19), Max: 99 (12-08-19 @ 10:55)  HR: 62 (12-09-19 @ 05:19) (62 - 72)  BP: 153/61 (12-09-19 @ 05:19) (134/66 - 160/59)  RR: 17 (12-09-19 @ 05:19) (17 - 19)  SpO2: 99% (12-09-19 @ 05:19) (96% - 100%)    I&O's Summary  07 Dec 2019 07:01  -  08 Dec 2019 07:00  --------------------------------------------------------  IN: 150 mL / OUT: 1751 mL / NET: -1601 mL    08 Dec 2019 07:01  -  09 Dec 2019 06:53  --------------------------------------------------------  IN: 100 mL / OUT: 851 mL / NET: -751 mL      MEDICATIONS  (STANDING):  acetaminophen   Tablet .. 650 milliGRAM(s) Oral every 6 hours  cyanocobalamin 1000 MICROGram(s) Oral daily  enoxaparin Injectable 40 milliGRAM(s) SubCutaneous daily  famotidine    Tablet 20 milliGRAM(s) Oral daily  ketorolac   Injectable 30 milliGRAM(s) IV Push every 8 hours  loratadine 10 milliGRAM(s) Oral daily  mupirocin 2% Ointment 1 Application(s) Topical three times a day  senna 2 Tablet(s) Oral at bedtime  sodium chloride 0.9% lock flush 3 milliLiter(s) IV Push every 8 hours    MEDICATIONS  (PRN):  benzocaine 15 mG/menthol 3.6 mG (Sugar-Free) Lozenge 1 Lozenge Oral every 4 hours PRN Sore Throat  oxyCODONE    IR 5 milliGRAM(s) Oral every 4 hours PRN Moderate Pain (4 - 6)  oxyCODONE    IR 10 milliGRAM(s) Oral every 6 hours PRN Severe Pain (7 - 10)  polyethylene glycol 3350 17 Gram(s) Oral at bedtime PRN Constipation  simethicone 80 milliGRAM(s) Chew four times a day PRN Gas      Physical Exam:  Constitutional: NAD, A+O x3  CV: RRR  Lungs: CTA B/L, no crackles. Good air movement throughout.   Abdomen: Soft, mildly distended, no guarding, no rebound tenderness. Normal bowel sounds.   Incisions: Clean, dry, intact. Steri-strips in place.   : Primafit in place. Effective. 200cc yellow urine in suction. No blood on amy.  Extremities: Bilateral LE lymphedema. Bilateral calves wrapped with ace bandages. Venodynes in place.

## 2019-12-09 NOTE — PROGRESS NOTE ADULT - PROBLEM SELECTOR PLAN 1
CV: Hemodynamically stable.   Pulm: Saturating well on 2L NC overnight. Will wean O2 throughout the day. Increase incentive spirometry.  GI: Regular diet.  : Primafit in place, with adequate urine output overnight.   Heme: Continue Lovenox and Venodynes for DVT prophylaxis.   Neuro: Continue Toradol, Tylenol and Oxycodone for pain control.  Dispo: Continue inpatient postoperative care. For rehab on discharge per PT, to be arranged today. Patient will follow up outpatient wound care.     Chandler, PGY-1 CV: Hemodynamically stable.   Pulm: Saturating well on 2L NC overnight. Will wean O2 throughout the day. Increase incentive spirometry.  GI: Regular diet.  : Primafit in place, with adequate urine output overnight.   Heme: Continue Lovenox and Venodynes for DVT prophylaxis.   Neuro: Continue Toradol, Tylenol and Oxycodone for pain control.  Dispo: Continue inpatient postoperative care. For rehab on discharge per PT, to be arranged today. Patient will follow up outpatient with wound care.     Chandler, PGY-1 CV: Hemodynamically stable.   Pulm: Saturating well on 2L NC overnight -- placed for pt request due to nasal congestion.  Now on RA.  Clear lungs on exam and no cough.   GI: Regular diet.  : Primafit in place, with adequate urine output overnight.   Heme: Continue Lovenox and Venodynes for DVT prophylaxis.   Neuro: Continue Toradol, Tylenol and Oxycodone for pain control.  Dispo: Continue inpatient postoperative care. For rehab on discharge per PT, to be arranged today. Check Ua/cx via straight cath.     Chandler, PGY-1 CV: Hemodynamically stable.   Pulm: Saturating well on 2L NC overnight -- placed for pt request due to nasal congestion.  Now on RA.  Clear lungs on exam and no cough.   GI: Regular diet.  : Primafit in place, with adequate urine output overnight.   Heme: Continue Lovenox and Venodynes for DVT prophylaxis.   Neuro: Continue Toradol, Tylenol and Oxycodone for pain control.  Dispo: Continue current care until rehab placement; the patient is medically cleared for discharge.  Consider urine culture via straight cath if persistent symptoms.    Chandler, PGY-1

## 2019-12-09 NOTE — DISCHARGE NOTE PROVIDER - CARE PROVIDERS DIRECT ADDRESSES
[email or web address]
(0) independent/acc to pt

## 2019-12-09 NOTE — DISCHARGE NOTE PROVIDER - HOSPITAL COURSE
73 y/o s/p RA-TLH, BSO, SLNM and PPALND for FIGO grade 1 endometrial carcinoma on 12/5/2019.  Please see operative note for details.  The patient was transferred to the floor post-op in stable condition with oral medication for pain control, a Prima-fit in place, and with a  regular diet. POD#1 Hct: 41.2->40.5.  Ms. Butler was seen by wound care for the  her history of bilateral lower extremity lymphedema. Her lower extremities were wrapped with ace bandages bilaterally, and she will follow-up with [facility] wound care after discharge. She was also seen by physical therapy, who recommended discharge to rehab. On the day of discharge the patient is afebrile and hemodynamically stable. She is ambulating without assistance, voiding spontaneously, and tolerating regular diet. He pain is well controlled on oral medication. She is cleared for discharge with instructions for appropriate follow up with Dr. Saleh on 12/20/2019 at 1:00 PM.

## 2019-12-09 NOTE — DISCHARGE NOTE PROVIDER - NSDCMRMEDTOKEN_GEN_ALL_CORE_FT
acetaminophen 325 mg oral tablet: 2 tab(s) orally every 6 hours  Bactroban 2% topical ointment: Apply topically to affected area 2 times a day Left ankle wound  labetalol 100 mg oral tablet: 1 tab(s) orally 2 times a day . Pt stopped taking it without doctors awareness and instead takes tumeric.  Naprosyn 500 mg oral tablet: 1 tab(s) orally every 12 hours MDD:2  Percocet 5/325 oral tablet: 1 tab(s) orally every 6 hours, As Needed -for severe pain MDD:4 acetaminophen 325 mg oral tablet: 2 tab(s) orally every 6 hours  Bactroban 2% topical ointment: Apply topically to affected area 2 times a day Left ankle wound  commode:   labetalol 100 mg oral tablet: 1 tab(s) orally 2 times a day . Pt stopped taking it without doctors awareness and instead takes tumeric.  Naprosyn 500 mg oral tablet: 1 tab(s) orally every 12 hours MDD:2  Percocet 5/325 oral tablet: 1 tab(s) orally every 6 hours, As Needed -for severe pain MDD:4

## 2019-12-10 ENCOUNTER — TRANSCRIPTION ENCOUNTER (OUTPATIENT)
Age: 74
End: 2019-12-10

## 2019-12-10 VITALS
SYSTOLIC BLOOD PRESSURE: 149 MMHG | DIASTOLIC BLOOD PRESSURE: 54 MMHG | OXYGEN SATURATION: 93 % | HEART RATE: 69 BPM | TEMPERATURE: 98 F | RESPIRATION RATE: 18 BRPM

## 2019-12-10 RX ADMIN — Medication 30 MILLIGRAM(S): at 12:45

## 2019-12-10 RX ADMIN — Medication 30 MILLIGRAM(S): at 02:21

## 2019-12-10 RX ADMIN — Medication 30 MILLIGRAM(S): at 02:51

## 2019-12-10 RX ADMIN — Medication 650 MILLIGRAM(S): at 12:45

## 2019-12-10 RX ADMIN — PREGABALIN 1000 MICROGRAM(S): 225 CAPSULE ORAL at 12:48

## 2019-12-10 RX ADMIN — LORATADINE 10 MILLIGRAM(S): 10 TABLET ORAL at 12:45

## 2019-12-10 RX ADMIN — SODIUM CHLORIDE 3 MILLILITER(S): 9 INJECTION INTRAMUSCULAR; INTRAVENOUS; SUBCUTANEOUS at 06:20

## 2019-12-10 RX ADMIN — FAMOTIDINE 20 MILLIGRAM(S): 10 INJECTION INTRAVENOUS at 12:45

## 2019-12-10 RX ADMIN — SIMETHICONE 80 MILLIGRAM(S): 80 TABLET, CHEWABLE ORAL at 12:45

## 2019-12-10 RX ADMIN — MUPIROCIN 1 APPLICATION(S): 20 OINTMENT TOPICAL at 12:46

## 2019-12-10 NOTE — PROGRESS NOTE ADULT - ATTENDING COMMENTS
Pt seen and examined, agree with gyn housestaff  Dispo planning  Plan d/c to rehab today
patient recovering well postop  plan for rehab, SW consult
Surgical findings and procedure were discussed at length including the plan for discharge to rehab facility as soon as bed is available.  Discussed plan for prescription to obtain bedside commode in her home to alleviate issues with urinary incontinence and decreased mobility as per her request (primafit at home not feasible without suction apparatus).  Continue DVT prophylaxis and await final pathology.  She verbalized an understanding and her questions were answered to her apparent satisfaction.    Josiane Saleh MD

## 2019-12-10 NOTE — PROGRESS NOTE ADULT - PROBLEM SELECTOR PLAN 1
CV: Hemodynamically stable.   Pulm: Saturating well on 2L NC overnight, now on RA saturating well. Clear lungs on exam and no cough.   GI: Regular diet.  : Primafit in place, unrecorded voids   Heme: Continue Lovenox and venodynes for DVT prophylaxis.   Neuro: Continue Toradol, Tylenol and Oxycodone for pain control.  Dispo: Continue current care until rehab placement; the patient is medically cleared for discharge.

## 2019-12-10 NOTE — PROGRESS NOTE ADULT - ASSESSMENT
74y female with PMHx of hypertension and bilateral lower extremity lymphedema now POD#1 s/p RA-TLH, BSO, SLNM and PPALND for FIGO grade 1 endometrial carcinoma, currently stable and recovering well postoperatively.
74y female with PMHx of hypertension and bilateral lower extremity lymphedema now POD#3 s/p RA-TLH, BSO, SLNM and PPALND for FIGO grade 1 endometrial carcinoma, currently stable and recovering well postoperatively.
74y female with medical history of hypertension and bilateral lower extremity lymphedema now POD#4 s/p RA-TLH, BSO, SLNM and PPALND for FIGO grade 1 endometrial carcinoma, currently stable and recovering well postoperatively.
74y female with medical history of hypertension and bilateral lower extremity lymphedema now POD#5 s/p RA-TLH, BSO, SLNM and PPALND for FIGO grade 1 endometrial carcinoma, currently stable and recovering well postoperatively.
74y female with PMHx of hypertension and bilateral lower extremity lymphedema now POD#2 s/p RA-TLH, BSO, SLNM and PPALND for FIGO grade 1 endometrial carcinoma, currently stable and recovering well postoperatively.

## 2019-12-10 NOTE — DISCHARGE NOTE NURSING/CASE MANAGEMENT/SOCIAL WORK - PATIENT PORTAL LINK FT
You can access the FollowMyHealth Patient Portal offered by [facility] by registering at the following website: [email or web address]. By joining [employer]’s FollowMyHealth portal, you will also be able to view your health information using other applications (apps) compatible with our system.

## 2019-12-10 NOTE — PROGRESS NOTE ADULT - SUBJECTIVE AND OBJECTIVE BOX
Gyn ONC Progress Note POD#5  HD#6    Subjective:   Pt seen and examined at bedside. No events overnight. Pain well controlled. Passing flatus, small BM yesterday. Tolerating regular diet. Pt denies fever, chills, chest pain, SOB, nausea, vomiting, lightheadedness, dizziness.      Objective:  T(F): 97.7 (12-10-19 @ 06:06), Max: 98.9 (19 @ 10:21)  HR: 59 (12-10-19 @ 06:06) (59 - 78)  BP: 146/65 (12-10-19 @ 06:06) (136/56 - 152/58)  RR: 18 (12-10-19 @ 06:06) (18 - 20)  SpO2: 100% (12-10-19 @ 06:06) (95% - 100%)  Wt(kg): --    I&O's Summary  08 Dec 2019 07:  -  09 Dec 2019 07:00  --------------------------------------------------------  IN: 100 mL / OUT: 851 mL / NET: -751 mL    09 Dec 2019 07:  -  10 Dec 2019 06:40  --------------------------------------------------------  IN: 650 mL / OUT: 602 mL / NET: 48 mL      MEDICATIONS  (STANDING):  acetaminophen   Tablet .. 650 milliGRAM(s) Oral every 6 hours  cyanocobalamin 1000 MICROGram(s) Oral daily  enoxaparin Injectable 40 milliGRAM(s) SubCutaneous daily  famotidine    Tablet 20 milliGRAM(s) Oral daily  ketorolac   Injectable 30 milliGRAM(s) IV Push every 8 hours  loratadine 10 milliGRAM(s) Oral daily  mupirocin 2% Ointment 1 Application(s) Topical three times a day  senna 2 Tablet(s) Oral at bedtime  sodium chloride 0.9% lock flush 3 milliLiter(s) IV Push every 8 hours    MEDICATIONS  (PRN):  benzocaine 15 mG/menthol 3.6 mG (Sugar-Free) Lozenge 1 Lozenge Oral every 4 hours PRN Sore Throat  oxyCODONE    IR 5 milliGRAM(s) Oral every 4 hours PRN Moderate Pain (4 - 6)  oxyCODONE    IR 10 milliGRAM(s) Oral every 6 hours PRN Severe Pain (7 - 10)  polyethylene glycol 3350 17 Gram(s) Oral at bedtime PRN Constipation  simethicone 80 milliGRAM(s) Chew four times a day PRN Gas      Physical Exam:  Constitutional: NAD, A+O x3  CV: RRR S1S2 no m/r/g  Lungs: CTA b/l, good air flow b/l  Abdomen: soft, softly-distended, non-tender. no guarding, no rebound, normal bowel sounds  Incision: 4x lsc incisions clean, dry, intact with steri strips   Extremities: no lower extremity edema or calf tenderness bilaterally; venodynes in place        Urinalysis Basic - ( 09 Dec 2019 16:30 )    Color: LIGHT YELLOW / Appearance: CLEAR / S.008 / pH: 7.5  Gluc: NEGATIVE / Ketone: NEGATIVE  / Bili: NEGATIVE / Urobili: NORMAL   Blood: NEGATIVE / Protein: NEGATIVE / Nitrite: NEGATIVE   Leuk Esterase: NEGATIVE / RBC: x / WBC x   Sq Epi: x / Non Sq Epi: x / Bacteria: x

## 2019-12-22 LAB — SURGICAL PATHOLOGY STUDY: SIGNIFICANT CHANGE UP

## 2020-01-17 ENCOUNTER — APPOINTMENT (OUTPATIENT)
Dept: GYNECOLOGIC ONCOLOGY | Facility: CLINIC | Age: 75
End: 2020-01-17

## 2020-04-06 PROCEDURE — 88342 IMHCHEM/IMCYTCHM 1ST ANTB: CPT | Mod: 26

## 2020-04-06 PROCEDURE — 88341 IMHCHEM/IMCYTCHM EA ADD ANTB: CPT | Mod: 26

## 2020-05-11 PROBLEM — C54.1 ENDOMETRIAL CANCER: Status: ACTIVE | Noted: 2020-05-11

## 2020-06-19 PROBLEM — I89.0 LYMPHEDEMA, NOT ELSEWHERE CLASSIFIED: Chronic | Status: ACTIVE | Noted: 2019-12-02

## 2020-06-19 PROBLEM — C44.90 UNSPECIFIED MALIGNANT NEOPLASM OF SKIN, UNSPECIFIED: Chronic | Status: ACTIVE | Noted: 2019-12-02

## 2020-06-19 PROBLEM — E66.01 MORBID (SEVERE) OBESITY DUE TO EXCESS CALORIES: Chronic | Status: ACTIVE | Noted: 2019-12-02

## 2020-06-19 PROBLEM — N85.00 ENDOMETRIAL HYPERPLASIA, UNSPECIFIED: Chronic | Status: ACTIVE | Noted: 2019-12-02

## 2020-07-20 ENCOUNTER — APPOINTMENT (OUTPATIENT)
Dept: CARDIOLOGY | Facility: CLINIC | Age: 75
End: 2020-07-20

## 2020-07-29 ENCOUNTER — APPOINTMENT (OUTPATIENT)
Dept: PULMONOLOGY | Facility: CLINIC | Age: 75
End: 2020-07-29
Payer: MEDICARE

## 2020-07-29 VITALS
BODY MASS INDEX: 40.29 KG/M2 | DIASTOLIC BLOOD PRESSURE: 69 MMHG | SYSTOLIC BLOOD PRESSURE: 177 MMHG | WEIGHT: 236 LBS | OXYGEN SATURATION: 97 % | RESPIRATION RATE: 16 BRPM | HEART RATE: 68 BPM | HEIGHT: 64 IN

## 2020-07-29 PROCEDURE — 95800 SLP STDY UNATTENDED: CPT

## 2020-07-29 PROCEDURE — 99204 OFFICE O/P NEW MOD 45 MIN: CPT

## 2020-07-30 PROCEDURE — 95800 SLP STDY UNATTENDED: CPT | Mod: 52

## 2020-07-31 ENCOUNTER — APPOINTMENT (OUTPATIENT)
Dept: GYNECOLOGIC ONCOLOGY | Facility: CLINIC | Age: 75
End: 2020-07-31

## 2020-08-10 ENCOUNTER — APPOINTMENT (OUTPATIENT)
Dept: PULMONOLOGY | Facility: CLINIC | Age: 75
End: 2020-08-10
Payer: MEDICARE

## 2020-08-10 VITALS
SYSTOLIC BLOOD PRESSURE: 154 MMHG | RESPIRATION RATE: 16 BRPM | TEMPERATURE: 97.8 F | OXYGEN SATURATION: 94 % | HEART RATE: 59 BPM | DIASTOLIC BLOOD PRESSURE: 71 MMHG

## 2020-08-10 DIAGNOSIS — G47.33 OBSTRUCTIVE SLEEP APNEA (ADULT) (PEDIATRIC): ICD-10-CM

## 2020-08-10 DIAGNOSIS — R06.83 SNORING: ICD-10-CM

## 2020-08-10 PROCEDURE — 99214 OFFICE O/P EST MOD 30 MIN: CPT

## 2020-08-10 NOTE — REASON FOR VISIT
[Consultation] : a consultation [Sleep Apnea] : sleep apnea [Hypersomnolence] : hypersomnolence [TextBox_44] : Snoring for years

## 2020-08-10 NOTE — HISTORY OF PRESENT ILLNESS
[TextBox_4] : Sophie is a pleasant 75-year-old female came in complaining of excessive daytime sleepiness and snoring for years, but no chest pain breath, cough, fever or chills.

## 2020-08-10 NOTE — DISCUSSION/SUMMARY
[FreeTextEntry1] : Sophie is a patient with excessive daytime sleepiness and snoring secondary to just diagnosed moderate obstructive sleep apnea

## 2020-08-10 NOTE — ASSESSMENT
[FreeTextEntry1] : Ordered a home sleep study to definitively rule out FERNIE\par I advised Sophie to return to the office for follow-up visit after home sleep study has been performed

## 2020-08-10 NOTE — DISCUSSION/SUMMARY
[FreeTextEntry1] : Sophie is a patient with excessive daytime sleepiness and snoring, need to definitively rule out obstructive sleep apnea

## 2020-08-10 NOTE — HISTORY OF PRESENT ILLNESS
[TextBox_4] : Here for HSS results. Saw Cardiologist, Dr. Navarro, states confirmed to have cardiac arrhythmia.

## 2020-08-10 NOTE — CONSULT LETTER
[Dear  ___] : Dear  [unfilled], [Courtesy Letter:] : I had the pleasure of seeing your patient, [unfilled], in my office today. [Consult Closing:] : Thank you very much for allowing me to participate in the care of this patient.  If you have any questions, please do not hesitate to contact me. [Sincerely,] : Sincerely, [FreeTextEntry3] : Dr. Erinn Turpin

## 2020-08-10 NOTE — CONSULT LETTER
[Dear  ___] : Dear  [unfilled], [Courtesy Letter:] : I had the pleasure of seeing your patient, [unfilled], in my office today. [Please see my note below.] : Please see my note below. [Consult Closing:] : Thank you very much for allowing me to participate in the care of this patient.  If you have any questions, please do not hesitate to contact me. [FreeTextEntry3] : Dr. Erinn Turpin [Sincerely,] : Sincerely,

## 2020-08-10 NOTE — ASSESSMENT
[FreeTextEntry1] : Discussed with patient at length regarding the aforementioned sleep study findings and all treatment options in the office today, will start patient on APAP(Auto-titrating positive airway pressure) at 5-20 cm H2O via face mask at this point.\par I advised Sophie to return to the office for sleep follow-up visit in 6 weeks

## 2020-09-18 NOTE — DISCHARGE NOTE ADULT - MEDICATION SUMMARY - MEDICATIONS TO TAKE
Neuro
I will START or STAY ON the medications listed below when I get home from the hospital:    oxyCODONE-acetaminophen 5 mg-325 mg oral tablet  -- 1 tab(s) by mouth every 4 hours, As needed, Mild Pain (1 - 3)  -- Indication: For pain control    oxyCODONE-acetaminophen 5 mg-325 mg oral tablet  -- 2 tab(s) by mouth every 4 hours, As needed, Moderate Pain (4 - 6)  -- Indication: For pain control    Trandate  -- Indication: For Home med    labetalol 100 mg oral tablet  -- 1 tab(s) by mouth 2 times a day . Pt stopped taking it without doctors awareness and instead takes tumeric.  -- Indication: For Home med

## 2021-05-13 ENCOUNTER — NON-APPOINTMENT (OUTPATIENT)
Age: 76
End: 2021-05-13

## 2021-05-31 NOTE — ED ADULT NURSE NOTE - CAS EDP DISCH DISPOSITION ADMI
50 y/o male with PMH s/f Lymphoma in remission(s/p chemo through epigastrium), GERD, HLD presenting to ED with c/o RUQ pain of 4 days duration a/w N/V. RUQ USG today s/f only CBD dilation to 9 mm. Now s/p EGD, severe Esophagitis, Hiatal Hernia and gastritis, now on Carafate and double dose PPI.    PO challenge today with CLD, advance as tolerated  Pain/nausea control   PPI BID, Carafate QID  Ativan for anxiety  OOBA/IS/SCDs/SQH   AM Labs  GI consult   237w/MedSurg

## 2021-10-05 ENCOUNTER — APPOINTMENT (OUTPATIENT)
Dept: OBGYN | Facility: CLINIC | Age: 76
End: 2021-10-05

## 2022-03-21 ENCOUNTER — APPOINTMENT (OUTPATIENT)
Dept: OBGYN | Facility: CLINIC | Age: 77
End: 2022-03-21
Payer: MEDICARE

## 2022-03-21 VITALS — SYSTOLIC BLOOD PRESSURE: 144 MMHG | DIASTOLIC BLOOD PRESSURE: 78 MMHG | BODY MASS INDEX: 34.67 KG/M2 | WEIGHT: 202 LBS

## 2022-03-21 DIAGNOSIS — R35.0 FREQUENCY OF MICTURITION: ICD-10-CM

## 2022-03-21 LAB
BILIRUB UR QL STRIP: NORMAL
GLUCOSE UR-MCNC: NORMAL
HCG UR QL: 0.2 EU/DL
HGB UR QL STRIP.AUTO: NORMAL
KETONES UR-MCNC: NORMAL
LEUKOCYTE ESTERASE UR QL STRIP: NORMAL
NITRITE UR QL STRIP: NORMAL
PH UR STRIP: 5.5
PROT UR STRIP-MCNC: NORMAL
SP GR UR STRIP: 1.02

## 2022-03-21 PROCEDURE — 99212 OFFICE O/P EST SF 10 MIN: CPT

## 2022-03-21 RX ORDER — NITROFURANTOIN (MONOHYDRATE/MACROCRYSTALS) 25; 75 MG/1; MG/1
100 CAPSULE ORAL TWICE DAILY
Qty: 10 | Refills: 0 | Status: ACTIVE | COMMUNITY
Start: 2022-03-21 | End: 1900-01-01

## 2022-03-21 NOTE — PHYSICAL EXAM
[Chaperone Declined] : Patient declined chaperone [FreeTextEntry1] : p [Normal] : uterus [Atrophy] : atrophy [Erythema] : erythema [Rectocele] : a rectocele [Discharge] : no discharge [Tenderness] : no tenderness [Dry Mucosa] : dry mucosa [No Bleeding] : there was no active vaginal bleeding [Uterine Adnexae] : were not tender and not enlarged

## 2022-03-21 NOTE — CHIEF COMPLAINT
[Urgent Visit] : Urgent Visit [FreeTextEntry1] : 77 y/o postmenopausal female, s/p hysterectomy presents with her daughter Janel, for an urgent visit c/o urinary frequency and having 1 episode of "brown urine" earlier today.  As per patients daughter she is a poor historian bc for the past 1 year has been forgetful.  As per patients daughter pt c/o urinary frequency x 1 week and urine was very dark this morning.  Patients daughter thinks this maybe all due to patient taking Trospium prescribed by urogyn.  Denies AUB. \par \par ROS:  Denies fever/chills, HA, Cough/sore throat, CP, SOB, N/V, Diarrhea/Constipation, Pelvic pain, Denies vaginal bleeding, discharge or irritation.  \par \par Medical History\par GYN HX: denies\par PMH: denies\par PSH: denies\par Meds:OCP\par Allergies: NKDA\par

## 2022-03-21 NOTE — DISCUSSION/SUMMARY
[FreeTextEntry1] : UA  small blood\par urine culture sent\par macrobid sent for possible UTI.\par GYN counseling: Patient instructed to call for Unusual Pelvic pain, UTI symptoms, irregular vaginal bleeding/discharge- Patient verbalized agreement\par F/U: patient to follow up if no relief.

## 2022-03-25 LAB — BACTERIA UR CULT: NORMAL

## 2022-07-25 NOTE — ASU PATIENT PROFILE, ADULT - LEARNING ASSESSMENT (PATIENT) ADDITIONAL COMMENTS
"LVM FOR PATIENT WITH RESULTS.     FOR HUB TO SAY     ----- Message from Juan Bautista PA-C sent at 7/25/2022  9:20 AM EDT -----  \"Blood work overall looks good. \"    " n/a

## 2023-03-23 ENCOUNTER — NON-APPOINTMENT (OUTPATIENT)
Age: 78
End: 2023-03-23

## 2023-10-14 ENCOUNTER — EMERGENCY (EMERGENCY)
Facility: HOSPITAL | Age: 78
LOS: 1 days | Discharge: HOME | End: 2023-10-14
Attending: EMERGENCY MEDICINE | Admitting: EMERGENCY MEDICINE
Payer: MEDICARE

## 2023-10-14 VITALS
OXYGEN SATURATION: 97 % | DIASTOLIC BLOOD PRESSURE: 82 MMHG | HEART RATE: 60 BPM | TEMPERATURE: 98 F | SYSTOLIC BLOOD PRESSURE: 178 MMHG | RESPIRATION RATE: 18 BRPM | HEIGHT: 64 IN | WEIGHT: 199.96 LBS

## 2023-10-14 DIAGNOSIS — Z90.49 ACQUIRED ABSENCE OF OTHER SPECIFIED PARTS OF DIGESTIVE TRACT: Chronic | ICD-10-CM

## 2023-10-14 DIAGNOSIS — Z98.49 CATARACT EXTRACTION STATUS, UNSPECIFIED EYE: Chronic | ICD-10-CM

## 2023-10-14 LAB
ALBUMIN SERPL ELPH-MCNC: 3.7 G/DL — SIGNIFICANT CHANGE UP (ref 3.3–5)
ALP SERPL-CCNC: 81 U/L — SIGNIFICANT CHANGE UP (ref 40–120)
ALT FLD-CCNC: 25 U/L — SIGNIFICANT CHANGE UP (ref 12–78)
ANION GAP SERPL CALC-SCNC: 4 MMOL/L — LOW (ref 5–17)
APPEARANCE UR: CLEAR — SIGNIFICANT CHANGE UP
APTT BLD: 29.1 SEC — SIGNIFICANT CHANGE UP (ref 24.5–35.6)
AST SERPL-CCNC: 22 U/L — SIGNIFICANT CHANGE UP (ref 15–37)
BASOPHILS # BLD AUTO: 0.07 K/UL — SIGNIFICANT CHANGE UP (ref 0–0.2)
BASOPHILS NFR BLD AUTO: 1.3 % — SIGNIFICANT CHANGE UP (ref 0–2)
BILIRUB SERPL-MCNC: 0.6 MG/DL — SIGNIFICANT CHANGE UP (ref 0.2–1.2)
BILIRUB UR-MCNC: NEGATIVE — SIGNIFICANT CHANGE UP
BUN SERPL-MCNC: 19 MG/DL — SIGNIFICANT CHANGE UP (ref 7–23)
CALCIUM SERPL-MCNC: 9.3 MG/DL — SIGNIFICANT CHANGE UP (ref 8.5–10.1)
CHLORIDE SERPL-SCNC: 104 MMOL/L — SIGNIFICANT CHANGE UP (ref 96–108)
CO2 SERPL-SCNC: 30 MMOL/L — SIGNIFICANT CHANGE UP (ref 22–31)
COLOR SPEC: YELLOW — SIGNIFICANT CHANGE UP
CREAT SERPL-MCNC: 0.76 MG/DL — SIGNIFICANT CHANGE UP (ref 0.5–1.3)
DIFF PNL FLD: NEGATIVE — SIGNIFICANT CHANGE UP
EGFR: 79 ML/MIN/1.73M2 — SIGNIFICANT CHANGE UP
EOSINOPHIL # BLD AUTO: 0.19 K/UL — SIGNIFICANT CHANGE UP (ref 0–0.5)
EOSINOPHIL NFR BLD AUTO: 3.6 % — SIGNIFICANT CHANGE UP (ref 0–6)
GLUCOSE SERPL-MCNC: 89 MG/DL — SIGNIFICANT CHANGE UP (ref 70–99)
GLUCOSE UR QL: NEGATIVE MG/DL — SIGNIFICANT CHANGE UP
HCT VFR BLD CALC: 39.4 % — SIGNIFICANT CHANGE UP (ref 34.5–45)
HGB BLD-MCNC: 12.5 G/DL — SIGNIFICANT CHANGE UP (ref 11.5–15.5)
IMM GRANULOCYTES NFR BLD AUTO: 0.9 % — SIGNIFICANT CHANGE UP (ref 0–0.9)
INR BLD: 0.92 RATIO — SIGNIFICANT CHANGE UP (ref 0.85–1.18)
KETONES UR-MCNC: NEGATIVE MG/DL — SIGNIFICANT CHANGE UP
LEUKOCYTE ESTERASE UR-ACNC: ABNORMAL
LYMPHOCYTES # BLD AUTO: 1.31 K/UL — SIGNIFICANT CHANGE UP (ref 1–3.3)
LYMPHOCYTES # BLD AUTO: 24.6 % — SIGNIFICANT CHANGE UP (ref 13–44)
MCHC RBC-ENTMCNC: 26.8 PG — LOW (ref 27–34)
MCHC RBC-ENTMCNC: 31.7 GM/DL — LOW (ref 32–36)
MCV RBC AUTO: 84.4 FL — SIGNIFICANT CHANGE UP (ref 80–100)
MONOCYTES # BLD AUTO: 0.56 K/UL — SIGNIFICANT CHANGE UP (ref 0–0.9)
MONOCYTES NFR BLD AUTO: 10.5 % — SIGNIFICANT CHANGE UP (ref 2–14)
NEUTROPHILS # BLD AUTO: 3.14 K/UL — SIGNIFICANT CHANGE UP (ref 1.8–7.4)
NEUTROPHILS NFR BLD AUTO: 59.1 % — SIGNIFICANT CHANGE UP (ref 43–77)
NITRITE UR-MCNC: NEGATIVE — SIGNIFICANT CHANGE UP
NRBC # BLD: 0 /100 WBCS — SIGNIFICANT CHANGE UP (ref 0–0)
PH UR: 5.5 — SIGNIFICANT CHANGE UP (ref 5–8)
PLATELET # BLD AUTO: 301 K/UL — SIGNIFICANT CHANGE UP (ref 150–400)
POTASSIUM SERPL-MCNC: 4.6 MMOL/L — SIGNIFICANT CHANGE UP (ref 3.5–5.3)
POTASSIUM SERPL-SCNC: 4.6 MMOL/L — SIGNIFICANT CHANGE UP (ref 3.5–5.3)
PROT SERPL-MCNC: 8 G/DL — SIGNIFICANT CHANGE UP (ref 6–8.3)
PROT UR-MCNC: NEGATIVE MG/DL — SIGNIFICANT CHANGE UP
PROTHROM AB SERPL-ACNC: 10.8 SEC — SIGNIFICANT CHANGE UP (ref 9.5–13)
RBC # BLD: 4.67 M/UL — SIGNIFICANT CHANGE UP (ref 3.8–5.2)
RBC # FLD: 13.4 % — SIGNIFICANT CHANGE UP (ref 10.3–14.5)
SODIUM SERPL-SCNC: 138 MMOL/L — SIGNIFICANT CHANGE UP (ref 135–145)
SP GR SPEC: 1.01 — SIGNIFICANT CHANGE UP (ref 1–1.03)
TROPONIN I, HIGH SENSITIVITY RESULT: 7 NG/L — SIGNIFICANT CHANGE UP
UROBILINOGEN FLD QL: 0.2 MG/DL — SIGNIFICANT CHANGE UP (ref 0.2–1)
WBC # BLD: 5.32 K/UL — SIGNIFICANT CHANGE UP (ref 3.8–10.5)
WBC # FLD AUTO: 5.32 K/UL — SIGNIFICANT CHANGE UP (ref 3.8–10.5)

## 2023-10-14 PROCEDURE — 99285 EMERGENCY DEPT VISIT HI MDM: CPT | Mod: FS

## 2023-10-14 PROCEDURE — 93010 ELECTROCARDIOGRAM REPORT: CPT

## 2023-10-14 PROCEDURE — 70498 CT ANGIOGRAPHY NECK: CPT | Mod: 26,MA

## 2023-10-14 PROCEDURE — 70450 CT HEAD/BRAIN W/O DYE: CPT | Mod: 26,XU,MA

## 2023-10-14 PROCEDURE — 70496 CT ANGIOGRAPHY HEAD: CPT | Mod: 26,MA

## 2023-10-14 RX ORDER — AMLODIPINE BESYLATE 2.5 MG/1
5 TABLET ORAL ONCE
Refills: 0 | Status: COMPLETED | OUTPATIENT
Start: 2023-10-14 | End: 2023-10-14

## 2023-10-14 NOTE — ED PROVIDER NOTE - CLINICAL SUMMARY MEDICAL DECISION MAKING FREE TEXT BOX
Patient is an 78-year-old female who presents to the emergency room with multiple medical complaints.  Past medical history of skin cancer, lymphedema, endometrial hyperplasia, obesity, hypertension.  Patient presents today for unsteady gait.  Daughter and patient reported that about 8 days ago patient lost her balance and fell injuring the left side of her face.  She was seen in urgent care underwent outpatient CTs which were negative for acute pathology.  Patient had been okay and today she stood up with the help of a walker felt like she was leaning to the left and was unsteady so she sat back down.  Incident occurred around 4 PM.  She has not attempted to ambulate since.  Denies any headache denies a sensation of the room spinning.  Denies any nausea vomiting chest pain shortness of breath abdominal pain.  Denies extremity numbness.  Denies blurred vision.  Daughter reports that she had a normal MRI/MRA of the brain about 6 months ago and has also had a previous negative cardiac work-up within the last several months.  Has been recently diagnosed with mild vascular dementia by a neurologist.  On exam patient is sitting up no acute distress awake alert oriented to person place and time.  Healing ecchymosis is noted to the left forehead facial region.  No orbital deformity is noted.  Pupils equal round and reactive.  Heart is regular rate lungs are clear to auscultation abdomen soft nontender nondistended.  There is no midline C-T-L tenderness to palpation.  Patient with bilateral lymphedema left worse than right.  No evidence of superimposed cellulitis.  NIH stroke scale of 0.  Patient presenting to the emergency room with unsteady gait.  Differential is broad includes possible deconditioning versus acute neurologic pathology including but not limited to CVA versus cardiac pathology.  Currently NIH stroke scale is 0 therefore patient not be considered a tenecteplase candidate.  Will obtain CT imaging of the brain CT imaging of the head and neck.  Will obtain screening labs check cardiac enzymes EKG and monitor.  Results of labs reviewed patient with a normal white count no evidence of anemia no significant electrolyte abnormality troponin within normal urine does not appear to be infected.  CT imaging reveals no large acute infarct or intracranial hemorrhage.  There is moderate stenosis at the origin of the right internal carotid artery no significant stenosis in the left internal carotid.  Bilateral vertebral arteries are patent.  There is a nodule along the right thyroid gland.  Independent review of EKG reveals a sinus bradycardia first-degree block at 52 bpm.  Was recommended that patient be admitted for further neurologic and cardiac work-up.  Refusing at this time.  In agreement to stay overnight for at least cardiac evaluation and reassessment.  Signed out to night attending.

## 2023-10-14 NOTE — ED ADULT NURSE NOTE - OBJECTIVE STATEMENT
Patient is complaining of unexplained weakness which has caused her to have several falls recently, the most recent fall pt describes she was in a chair and found her self drifting over then falling on the floor. Pt denies dizziness or feeling a LOC before the falls. Pt uses a wheelchair to ambulate due to severe lymphedema in lower extremities. Some bruising evident to L side of face and arm.

## 2023-10-14 NOTE — ED ADULT NURSE NOTE - NSICDXPASTMEDICALHX_GEN_ALL_CORE_FT
PAST MEDICAL HISTORY:  Endometrial hyperplasia     HTN (hypertension)     Lymphedema of leg bilateral LE    Morbid obesity     Skin cancer nose.  Pt recently had biopsy and will n\require Moh's surgery in future

## 2023-10-14 NOTE — ED PROVIDER NOTE - PROGRESS NOTE DETAILS
all results d/w daughter who sates has know plaque on ct and mri was done offered admission pt and family declined states would rather see cardiology in am and decide if symptoms related to labetalol causing bradycardia and low bp   will hold for morning cardiology Patient was seen and evaluated by Dr. Argueta cardiology and patient will be discharged with follow-up with him in his office for echocardiogram and Holter monitor.

## 2023-10-14 NOTE — ED PROVIDER NOTE - CONSTITUTIONAL, MLM
normal... Well appearing, awake, alert, oriented to person, place, time/situation and in no apparent distress no facial droop ecchymosis left forehead/face

## 2023-10-14 NOTE — ED ADULT TRIAGE NOTE - CHIEF COMPLAINT QUOTE
" fell 8 days ago, facial injury, lt orbital bruise, swollen,  poor balance" " fell 8 days ago, facial injury, lt orbital bruise, swollen,  poor balance" got CT,  MRI of brain

## 2023-10-14 NOTE — ED PROVIDER NOTE - CARE PROVIDER_API CALL
Maximo Shell  Cardiology  [address] [place] [address]  Phone: [phone]  Fax: [phone]  Follow Up Time:

## 2023-10-14 NOTE — ED PROVIDER NOTE - DIFFERENTIAL DIAGNOSIS
Differential Diagnosis Patient presenting to the emergency room with unsteady gait.  Differential is broad includes possible deconditioning versus acute neurologic pathology including but not limited to CVA versus cardiac pathology.  Currently NIH stroke scale is 0 therefore patient not be considered a tenecteplase candidate.  Will obtain CT imaging of the brain CT imaging of the head and neck.  Will obtain screening labs check cardiac enzymes EKG and monitor.

## 2023-10-14 NOTE — ED PROVIDER NOTE - PATIENT PORTAL LINK FT
You can access the FollowMyHealth Patient Portal offered by [facility] by registering at the following website: [email or web address]. By joining [facility]’s FollowMyHealth portal, you will also be able to view your health information using other applications (apps) compatible with our system.

## 2023-10-14 NOTE — ED ADULT NURSE NOTE - NSFALLUNIVINTERV_ED_ALL_ED
Bed/Stretcher in lowest position, wheels locked, appropriate side rails in place/Call bell, personal items and telephone in reach/Instruct patient to call for assistance before getting out of bed/chair/stretcher/Non-slip footwear applied when patient is off stretcher/[place] to call system/Physically safe environment - no spills, clutter or unnecessary equipment/Purposeful proactive rounding/Room/bathroom lighting operational, light cord in reach

## 2023-10-14 NOTE — ED PROVIDER NOTE - NSFOLLOWUPINSTRUCTIONS_ED_ALL_ED_FT
Rest.  Increase fluid intake.  Follow-up with Dr. Argueta in his office tomorrow for echocardiogram and Holter monitor/ZIO.  Return if needed.

## 2023-10-14 NOTE — ED PROVIDER NOTE - OBJECTIVE STATEMENT
Patient is a 78-year-old female with past medical hx of skin cancer lymphedema legs endometrial hyperplasia obesity hypertension cholecystectomy brought in by EMS for unsteady gait.  Daughter states patient lost balance and fell about 8 days ago at left-sided face was seen in urgent care negative CAT scans.  Today patient states she was standing up with the help of a walker and felt like she was walking towards the left side so sat back down around 4 pm today.  Patient denies any dizziness falls chest pain shortness of breath blurry vision numbness tingling weakness.  Daughter states she had a normal MRI MRA about 6 months ago as well as a negative cardiac work-up.  Patient was diagnosed with mild vascular dementia by neurologist.

## 2023-10-14 NOTE — ED ADULT NURSE NOTE - NSICDXPASTSURGICALHX_GEN_ALL_CORE_FT
PAST SURGICAL HISTORY:  H/O cataract extraction 2014 (approx)    History of cholecystectomy 2018

## 2023-10-15 VITALS
HEART RATE: 60 BPM | OXYGEN SATURATION: 98 % | SYSTOLIC BLOOD PRESSURE: 165 MMHG | DIASTOLIC BLOOD PRESSURE: 70 MMHG | RESPIRATION RATE: 16 BRPM

## 2023-10-15 LAB
CULTURE RESULTS: SIGNIFICANT CHANGE UP
SPECIMEN SOURCE: SIGNIFICANT CHANGE UP

## 2023-10-15 PROCEDURE — 84484 ASSAY OF TROPONIN QUANT: CPT

## 2023-10-15 PROCEDURE — 70450 CT HEAD/BRAIN W/O DYE: CPT | Mod: XU,MA

## 2023-10-15 PROCEDURE — 99284 EMERGENCY DEPT VISIT MOD MDM: CPT

## 2023-10-15 PROCEDURE — 80053 COMPREHEN METABOLIC PANEL: CPT

## 2023-10-15 PROCEDURE — 85730 THROMBOPLASTIN TIME PARTIAL: CPT

## 2023-10-15 PROCEDURE — 85610 PROTHROMBIN TIME: CPT

## 2023-10-15 PROCEDURE — 70498 CT ANGIOGRAPHY NECK: CPT | Mod: MA

## 2023-10-15 PROCEDURE — 81001 URINALYSIS AUTO W/SCOPE: CPT

## 2023-10-15 PROCEDURE — 70496 CT ANGIOGRAPHY HEAD: CPT | Mod: MA

## 2023-10-15 PROCEDURE — 93005 ELECTROCARDIOGRAM TRACING: CPT

## 2023-10-15 PROCEDURE — 36415 COLL VENOUS BLD VENIPUNCTURE: CPT

## 2023-10-15 PROCEDURE — 99285 EMERGENCY DEPT VISIT HI MDM: CPT | Mod: 25

## 2023-10-15 PROCEDURE — 85025 COMPLETE CBC W/AUTO DIFF WBC: CPT

## 2023-10-15 PROCEDURE — 87086 URINE CULTURE/COLONY COUNT: CPT

## 2023-10-15 RX ADMIN — AMLODIPINE BESYLATE 5 MILLIGRAM(S): 2.5 TABLET ORAL at 00:06

## 2023-10-15 NOTE — CONSULT NOTE ADULT - ASSESSMENT
This is an 78 year old woman with morbid obesity, hypertension, chronic b/l lymphedema who presents to the ED with unsteady gait and imbalance.  She reports that when she walks, she feels that she drifts to the left.  SHe is not having dizziness or orthostatic symptoms.   She has chronotropic competence based on limited exercise in the ED.  I doubt her labetalol is causing significant bradycardia to cause her gait imbalance  I advised her to continue her current doses of labetalol 200 Qhs and amlodipine 2.5 QD and follow with her pMD.  She can be discharged from the ED from a cardiac point of view.

## 2023-10-15 NOTE — ED ADULT NURSE REASSESSMENT NOTE - NS ED NURSE REASSESS COMMENT FT1
Patient received from Elizabeth LEON. Pt alerted and oriented x4  resting in stretcher comfortably. All VSS- +chest rise, RR18. No complaining of  chest pain /dizziness/HA/ shortness of breath  @this time. No acute distress noted. Will continue to monitor.

## 2023-10-15 NOTE — CONSULT NOTE ADULT - SUBJECTIVE AND OBJECTIVE BOX
[facility] - Peter Barakat, Hugh Shell, [place], Robel Hairston  Office Number: [phone]    Initial Consult Note    CHIEF COMPLAINT: Patient is a 78y old  Female who presents with a chief complaint of unsteady gait    HPI:  This is an 78 year old woman with morbid obesity, hypertension, chronic b/l lymphedema who presents to the ED with unsteady gait and imbalance.  She reports that when she walks, she feels that she drifts to the left.  SHe is not having dizziness or orthostatic symptoms.  She denies syncope, CP, or changes in her ET>  Her LE edema is chronic.  The thought was that her bradycardia from labetalol 200 Qhs could be contributing to her symptoms.  I exercised her in the ED, and her HR augmented to the 80s with leg lifting at the bedside.        PAST MEDICAL & SURGICAL HISTORY:  HTN (hypertension)      Morbid obesity      Endometrial hyperplasia      Lymphedema of leg  bilateral LE      Skin cancer  nose.  Pt recently had biopsy and will nMoh's surgery in future      History of cholecystectomy  2018      H/O cataract extraction  2014 (approx)          SOCIAL HISTORY:  No tobacco, ethanol, or drug abuse.    FAMILY HISTORY:  FH: breast cancer  age 63      No family history of acute MI or sudden cardiac death.    MEDICATIONS  (STANDING):    MEDICATIONS  (PRN):      Allergies    penicillins (Anaphylaxis)    Intolerances        REVIEW OF SYSTEMS:       All other review of systems is negative unless indicated above    VITAL SIGNS:   Vital Signs Last 24 Hrs  T(C): 36.4 (15 Oct 2023 08:08), Max: 36.7 (14 Oct 2023 18:17)  T(F): 97.6 (15 Oct 2023 08:08), Max: 98 (14 Oct 2023 18:17)  HR: 60 (15 Oct 2023 08:08) (55 - 60)  BP: 127/76 (15 Oct 2023 08:08) (127/76 - 178/82)  BP(mean): --  RR: 16 (15 Oct 2023 08:08) (16 - 18)  SpO2: 98% (15 Oct 2023 08:08) (96% - 98%)    Parameters below as of 15 Oct 2023 08:08  Patient On (Oxygen Delivery Method): room air        I&O's Summary      On Exam:    Constitutional: NAD, alert and oriented x 3  Lungs:  Non-labored, breath sounds are clear bilaterally, No wheezing, rales or rhonchi  Cardiovascular: RRR.  S1 and S2 positive.  No murmurs, rubs, gallops or clicks  Gastrointestinal: Bowel Sounds present, soft, nontender.  Obese  Lymph: Chronic b/l LE lymphedema.  No cervical lymphadenopathy.  Neurological: Alert, no focal deficits  Skin: No rashes or ulcers   Psych:  Mood & affect appropriate.    LABS: All Labs Reviewed:                        12.5   5.32  )-----------( 301      ( 14 Oct 2023 19:55 )             39.4     14 Oct 2023 19:55    138    |  104    |  19     ----------------------------<  89     4.6     |  30     |  0.76     Ca    9.3        14 Oct 2023 19:55    TPro  8.0    /  Alb  3.7    /  TBili  0.6    /  DBili  x      /  AST  22     /  ALT  25     /  AlkPhos  81     14 Oct 2023 19:55    PT/INR - ( 14 Oct 2023 19:55 )   PT: 10.8 sec;   INR: 0.92 ratio         PTT - ( 14 Oct 2023 19:55 )  PTT:29.1 sec      Blood Culture:         RADIOLOGY:  All imaging reports reviewed in detail.     EKG:  Sinus bradycardia.

## 2023-11-03 NOTE — ED PROVIDER NOTE - NEUROLOGICAL, MLM
comfortable appearance comfortable appearance/cooperative/family/SO at bedside Alert and oriented, no focal deficits, no motor or sensory deficits.

## 2024-02-27 NOTE — H&P PST ADULT - NEGATIVE HEMATOLOGY SYMPTOMS
Beta Blocker         Neuro/Psych:      (-) seizures and CVA           GI/Hepatic/Renal:   (+) renal disease: kidney stones     (-) GERD and liver disease       Endo/Other:    (+) hypothyroidism::..    (-) diabetes mellitus               Abdominal:             Vascular: negative vascular ROS.         Other Findings:           Anesthesia Plan      general     ASA 2       Induction: intravenous.    MIPS: Postoperative opioids intended and Prophylactic antiemetics administered.  Anesthetic plan and risks discussed with patient and spouse.      Plan discussed with CRNA.                  Dayanna Kumari MD   2/27/2024            
no nose bleeding/no gum bleeding

## 2024-11-12 NOTE — PATIENT PROFILE ADULT - NSPROMEDSHERBAL_GEN_A_NUR
Detail Level: Generalized Continue Regimen: clobetasol 0.05 % scalp solution Bid\\nQuantity: 50.0 ml\\nSig: Apply a small amount to affected once daily for 2 weeks on, 1 weeks off, may repeat twice. Initiate Treatment: Allegra two pills q am, Zyrtec 2 pills at hs yes

## 2025-07-23 ENCOUNTER — APPOINTMENT (OUTPATIENT)
Dept: OBGYN | Facility: CLINIC | Age: 80
End: 2025-07-23
Payer: MEDICARE

## 2025-07-23 DIAGNOSIS — N89.8 OTHER SPECIFIED NONINFLAMMATORY DISORDERS OF VAGINA: ICD-10-CM

## 2025-07-23 DIAGNOSIS — R30.0 DYSURIA: ICD-10-CM

## 2025-07-23 LAB
BILIRUB UR QL STRIP: NORMAL
GLUCOSE UR-MCNC: NORMAL
HCG UR QL: 0.2 EU/DL
HGB UR QL STRIP.AUTO: NORMAL
KETONES UR-MCNC: NORMAL
LEUKOCYTE ESTERASE UR QL STRIP: NORMAL
NITRITE UR QL STRIP: NORMAL
PH UR STRIP: 5.5
PROT UR STRIP-MCNC: 30
SP GR UR STRIP: 1.02

## 2025-07-23 PROCEDURE — 99214 OFFICE O/P EST MOD 30 MIN: CPT

## 2025-07-23 PROCEDURE — 81002 URINALYSIS NONAUTO W/O SCOPE: CPT

## 2025-07-23 PROCEDURE — 99459 PELVIC EXAMINATION: CPT

## 2025-07-23 RX ORDER — FOSFOMYCIN TROMETHAMINE 3 G/1
3 POWDER ORAL
Qty: 1 | Refills: 0 | Status: ACTIVE | COMMUNITY
Start: 2025-07-23 | End: 1900-01-01

## 2025-07-24 LAB
CANDIDA VAG CYTO: DETECTED
G VAGINALIS+PREV SP MTYP VAG QL MICRO: NOT DETECTED
T VAGINALIS VAG QL WET PREP: NOT DETECTED

## 2025-07-29 RX ORDER — CIPROFLOXACIN HYDROCHLORIDE 250 MG/1
250 TABLET, FILM COATED ORAL
Qty: 6 | Refills: 0 | Status: ACTIVE | COMMUNITY
Start: 2025-07-29 | End: 1900-01-01

## 2025-07-29 RX ORDER — FLUCONAZOLE 150 MG/1
150 TABLET ORAL
Qty: 2 | Refills: 1 | Status: ACTIVE | COMMUNITY
Start: 2025-07-23 | End: 1900-01-01

## 2025-07-30 DIAGNOSIS — N39.0 URINARY TRACT INFECTION, SITE NOT SPECIFIED: ICD-10-CM

## 2025-07-30 LAB — BACTERIA UR CULT: ABNORMAL

## 2025-07-30 RX ORDER — CEPHALEXIN 500 MG/1
500 CAPSULE ORAL EVERY 8 HOURS
Qty: 21 | Refills: 0 | Status: ACTIVE | COMMUNITY
Start: 2025-07-30 | End: 1900-01-01

## 2025-07-30 RX ORDER — FLUCONAZOLE 200 MG/1
200 TABLET ORAL
Qty: 2 | Refills: 1 | Status: ACTIVE | COMMUNITY
Start: 2025-07-30 | End: 1900-01-01

## 2025-08-05 ENCOUNTER — APPOINTMENT (OUTPATIENT)
Dept: OBGYN | Facility: CLINIC | Age: 80
End: 2025-08-05

## 2025-08-05 DIAGNOSIS — B37.31 ACUTE CANDIDIASIS OF VULVA AND VAGINA: ICD-10-CM

## 2025-08-05 RX ORDER — KETOCONAZOLE 200 MG/1
200 TABLET ORAL
Qty: 14 | Refills: 0 | Status: ACTIVE | COMMUNITY
Start: 2025-08-05 | End: 1900-01-01

## 2025-08-12 ENCOUNTER — NON-APPOINTMENT (OUTPATIENT)
Age: 80
End: 2025-08-12

## 2025-08-22 ENCOUNTER — NON-APPOINTMENT (OUTPATIENT)
Age: 80
End: 2025-08-22